# Patient Record
Sex: MALE | Race: WHITE | Employment: FULL TIME | URBAN - METROPOLITAN AREA
[De-identification: names, ages, dates, MRNs, and addresses within clinical notes are randomized per-mention and may not be internally consistent; named-entity substitution may affect disease eponyms.]

---

## 2018-09-24 ENCOUNTER — HOSPITAL ENCOUNTER (OUTPATIENT)
Dept: VASCULAR LAB | Age: 63
Discharge: HOME | End: 2018-09-24
Payer: COMMERCIAL

## 2018-09-24 PROCEDURE — 93970 EXTREMITY STUDY: CPT

## 2019-05-15 ENCOUNTER — HOSPITAL ENCOUNTER (EMERGENCY)
Age: 64
Discharge: HOME | End: 2019-05-15
Attending: EMERGENCY MEDICINE
Payer: COMMERCIAL

## 2019-05-15 ENCOUNTER — APPOINTMENT (OUTPATIENT)
Dept: CT IMAGING | Age: 64
End: 2019-05-15
Payer: COMMERCIAL

## 2019-05-15 ENCOUNTER — APPOINTMENT (OUTPATIENT)
Dept: GENERAL RADIOLOGY | Age: 64
End: 2019-05-15
Payer: COMMERCIAL

## 2019-05-15 VITALS
RESPIRATION RATE: 16 BRPM | BODY MASS INDEX: 24.33 KG/M2 | SYSTOLIC BLOOD PRESSURE: 142 MMHG | HEIGHT: 69 IN | OXYGEN SATURATION: 96 % | WEIGHT: 164.24 LBS | DIASTOLIC BLOOD PRESSURE: 71 MMHG | HEART RATE: 96 BPM | TEMPERATURE: 98.6 F

## 2019-05-15 DIAGNOSIS — K57.32 DIVERTICULITIS OF COLON: Primary | ICD-10-CM

## 2019-05-15 LAB
A/G RATIO: 1.4 (ref 1.1–2.2)
ALBUMIN SERPL-MCNC: 4.4 G/DL (ref 3.4–5)
ALP BLD-CCNC: 57 U/L (ref 40–129)
ALT SERPL-CCNC: 23 U/L (ref 10–40)
AMYLASE: 56 U/L (ref 25–115)
ANION GAP SERPL CALCULATED.3IONS-SCNC: 13 MMOL/L (ref 3–16)
AST SERPL-CCNC: 19 U/L (ref 15–37)
BASOPHILS ABSOLUTE: 0 K/UL (ref 0–0.2)
BASOPHILS RELATIVE PERCENT: 0.6 %
BILIRUB SERPL-MCNC: 0.7 MG/DL (ref 0–1)
BILIRUBIN URINE: NEGATIVE
BLOOD, URINE: NEGATIVE
BUN BLDV-MCNC: 18 MG/DL (ref 7–20)
CALCIUM SERPL-MCNC: 9.6 MG/DL (ref 8.3–10.6)
CHLORIDE BLD-SCNC: 101 MMOL/L (ref 99–110)
CLARITY: CLEAR
CO2: 24 MMOL/L (ref 21–32)
COLOR: YELLOW
CREAT SERPL-MCNC: 1 MG/DL (ref 0.8–1.3)
D DIMER: <200 NG/ML DDU (ref 0–229)
EOSINOPHILS ABSOLUTE: 0.2 K/UL (ref 0–0.6)
EOSINOPHILS RELATIVE PERCENT: 3.1 %
GFR AFRICAN AMERICAN: >60
GFR NON-AFRICAN AMERICAN: >60
GLOBULIN: 3.2 G/DL
GLUCOSE BLD-MCNC: 188 MG/DL (ref 70–99)
GLUCOSE URINE: NEGATIVE MG/DL
HCT VFR BLD CALC: 37.6 % (ref 40.5–52.5)
HEMOGLOBIN: 13.2 G/DL (ref 13.5–17.5)
KETONES, URINE: NEGATIVE MG/DL
LEUKOCYTE ESTERASE, URINE: NEGATIVE
LIPASE: 35 U/L (ref 13–60)
LYMPHOCYTES ABSOLUTE: 1.7 K/UL (ref 1–5.1)
LYMPHOCYTES RELATIVE PERCENT: 21.9 %
MCH RBC QN AUTO: 32.4 PG (ref 26–34)
MCHC RBC AUTO-ENTMCNC: 35.2 G/DL (ref 31–36)
MCV RBC AUTO: 92 FL (ref 80–100)
MICROSCOPIC EXAMINATION: NORMAL
MONOCYTES ABSOLUTE: 0.6 K/UL (ref 0–1.3)
MONOCYTES RELATIVE PERCENT: 7.9 %
NEUTROPHILS ABSOLUTE: 5.2 K/UL (ref 1.7–7.7)
NEUTROPHILS RELATIVE PERCENT: 66.5 %
NITRITE, URINE: NEGATIVE
PDW BLD-RTO: 12.9 % (ref 12.4–15.4)
PH UA: 6.5 (ref 5–8)
PLATELET # BLD: 288 K/UL (ref 135–450)
PMV BLD AUTO: 7.7 FL (ref 5–10.5)
POTASSIUM REFLEX MAGNESIUM: 4 MMOL/L (ref 3.5–5.1)
PROTEIN UA: NEGATIVE MG/DL
RBC # BLD: 4.09 M/UL (ref 4.2–5.9)
SODIUM BLD-SCNC: 138 MMOL/L (ref 136–145)
SPECIFIC GRAVITY UA: 1.01 (ref 1–1.03)
TOTAL PROTEIN: 7.6 G/DL (ref 6.4–8.2)
TROPONIN: <0.01 NG/ML
URINE REFLEX TO CULTURE: NORMAL
URINE TYPE: NORMAL
UROBILINOGEN, URINE: 0.2 E.U./DL
WBC # BLD: 7.8 K/UL (ref 4–11)

## 2019-05-15 PROCEDURE — 99284 EMERGENCY DEPT VISIT MOD MDM: CPT

## 2019-05-15 PROCEDURE — 71046 X-RAY EXAM CHEST 2 VIEWS: CPT

## 2019-05-15 PROCEDURE — 93005 ELECTROCARDIOGRAM TRACING: CPT

## 2019-05-15 PROCEDURE — 74177 CT ABD & PELVIS W/CONTRAST: CPT

## 2019-05-15 PROCEDURE — 81003 URINALYSIS AUTO W/O SCOPE: CPT

## 2019-05-15 PROCEDURE — 85025 COMPLETE CBC W/AUTO DIFF WBC: CPT

## 2019-05-15 PROCEDURE — 6360000004 HC RX CONTRAST MEDICATION: Performed by: PHYSICIAN ASSISTANT

## 2019-05-15 PROCEDURE — 83690 ASSAY OF LIPASE: CPT

## 2019-05-15 PROCEDURE — 80053 COMPREHEN METABOLIC PANEL: CPT

## 2019-05-15 PROCEDURE — 82150 ASSAY OF AMYLASE: CPT

## 2019-05-15 PROCEDURE — 84484 ASSAY OF TROPONIN QUANT: CPT

## 2019-05-15 PROCEDURE — 85379 FIBRIN DEGRADATION QUANT: CPT

## 2019-05-15 RX ORDER — 0.9 % SODIUM CHLORIDE 0.9 %
1000 INTRAVENOUS SOLUTION INTRAVENOUS ONCE
Status: DISCONTINUED | OUTPATIENT
Start: 2019-05-15 | End: 2019-05-15

## 2019-05-15 RX ORDER — METRONIDAZOLE 500 MG/1
500 TABLET ORAL 3 TIMES DAILY
Qty: 21 TABLET | Refills: 0 | Status: SHIPPED | OUTPATIENT
Start: 2019-05-15 | End: 2019-05-22

## 2019-05-15 RX ORDER — ACETAMINOPHEN 500 MG
500 TABLET ORAL EVERY 6 HOURS PRN
Qty: 30 TABLET | Refills: 0 | Status: SHIPPED | OUTPATIENT
Start: 2019-05-15 | End: 2022-03-23

## 2019-05-15 RX ORDER — KETOROLAC TROMETHAMINE 30 MG/ML
15 INJECTION, SOLUTION INTRAMUSCULAR; INTRAVENOUS ONCE
Status: DISCONTINUED | OUTPATIENT
Start: 2019-05-15 | End: 2019-05-15

## 2019-05-15 RX ORDER — CIPROFLOXACIN 500 MG/1
500 TABLET, FILM COATED ORAL 2 TIMES DAILY
Qty: 14 TABLET | Refills: 0 | Status: SHIPPED | OUTPATIENT
Start: 2019-05-15 | End: 2019-05-22

## 2019-05-15 RX ADMIN — IOPAMIDOL 75 ML: 755 INJECTION, SOLUTION INTRAVENOUS at 18:05

## 2019-05-15 ASSESSMENT — ENCOUNTER SYMPTOMS
BACK PAIN: 0
ABDOMINAL PAIN: 1
VOMITING: 0
NAUSEA: 0
EYE PAIN: 0
SHORTNESS OF BREATH: 0
COUGH: 0
DIARRHEA: 0

## 2019-05-15 ASSESSMENT — PAIN SCALES - GENERAL
PAINLEVEL_OUTOF10: 0
PAINLEVEL_OUTOF10: 0

## 2019-05-15 NOTE — ED PROVIDER NOTES
Triage note: I evaluated this patient to initiate their ED workup in an expeditious manner. Please see notes from other ED providers regarding comprehensive evaluation including full history, physical exam, interpretation of results, and medical decision making/disposition.        Taisha Polk, MARNIE - PETRA  05/15/19 6513

## 2019-05-15 NOTE — ED PROVIDER NOTES
I independently performed a history and physical on Milena Knox. All diagnostic, treatment, and disposition decisions were made by myself in conjunction with the advanced practice provider. For further details of [facility] emergency department encounter, please see LATRELL Taylor's documentation. Patient is one day of left flank pain. He is constant pain that waxes and wanes. Pain is never had before. Has a prior history of PE but states this pain is different. He was sent here to rule out PE. On exam he has left flank tenderness with no rebound, rigidity or guarding. Labs and imaging consistent with ruptured diverticulitis. I advised patient regarding signs to watch for for rupture including fever, vomiting and increased pain. I also advised to follow up with his primary physician and gastroenterologist.  I also advised him to avoid alcohol while on antibiotics. I also advised avoidance of nuts and seeds in the diet until he follows up with his gastroenterologist.      EKG  The Ekg interpreted by me shows  normal sinus rhythm with a rate of 86  Axis is   Normal  QTc is  normal  Intervals and Durations are unremarkable.       ST Segments: no acute change  No significant change from prior EKG dated 15 sep 2018            Jacqueline Jay MD  05/15/19 3732

## 2019-05-15 NOTE — ED PROVIDER NOTES
[address] [facility]        Pt Name: Timur Guerrier  MRN: [id number]  [place] 1955  Date of evaluation: 5/15/2019  Provider: LATRELL Boyd  PCP: Evelina Aguayo MD    This patient was seen and evaluated by the attending physician Ge Whitaker, [address] [place] [address]       Chief Complaint   Patient presents with    Flank Pain     left flank pain. hx of pe. Dr sent pt due to r/o other issues. HISTORY OF PRESENT ILLNESS   (Location/Symptom, Timing/Onset, Context/Setting, Quality, Duration, Modifying Factors, Severity)  Note limiting factors. Timur Guerrier is a 58 y.o. male who presents to the ED for evaluation of left flank pain. Sent by PCP to ED. Onset of left flank pain was yesterday morning upon awakening. It occasionally radiates across his lower abdomen. Pain rated 3-5/10. It is sharp. Deep breaths make it worse. The patient denies fever or chills, chest pain, shortness of breath,  nausea, vomiting, diarrhea. No recent travel, exposure to sick contacts, or recent antibiotics. No other acute concerns, associated symptoms or modifying factors. Patient with PMH of PE in September 2018. Patient was on Eliquis for 4 months. Patient is not on any blood thinners at this time. Patient is unsure if this feels similar to prior PE. Nursing Notes were all reviewed and agreed with or any disagreements were addressed  in the HPI. REVIEW OF SYSTEMS    (2-9 systems for level 4, 10 or more for level 5)     Review of Systems   Constitutional: Negative for chills, fatigue and fever. Eyes: Negative for pain. Respiratory: Negative for cough and shortness of breath. Cardiovascular: Negative for chest pain. Gastrointestinal: Positive for abdominal pain. Negative for diarrhea, nausea and vomiting. Genitourinary: Negative for dysuria. Musculoskeletal: Negative for back pain, neck pain and neck stiffness. Skin: Negative for rash. Neurological: Negative for dizziness and headaches. Psychiatric/Behavioral: Negative for confusion. Positives and Pertinent negatives as per HPI. Except as noted abovein the ROS, all other systems were reviewed and negative. PAST MEDICAL HISTORY     Past Medical History:   Diagnosis Date    Hyperlipidemia          SURGICAL HISTORY   No past surgical history on file. CURRENTMEDICATIONS       Previous Medications    APIXABAN (ELIQUIS STARTER PACK) 5 MG TABS TABLET    Take 10 mg (2 tablets) orally twice daily for 7 days, then take 5 mg (1 tablet) orally twice daily thereafter. (1st dose given in ED)    GEMFIBROZIL (LOPID) 600 MG TABLET    Take 600 mg by mouth 2 times daily (before meals)    PANTOPRAZOLE (PROTONIX) 20 MG TABLET    Take 2 tablets by mouth daily         ALLERGIES     Patient has no known allergies. FAMILYHISTORY     No family history on file.        SOCIAL HISTORY       Social History     Socioeconomic History    Marital status:      Spouse name: Not on file    Number of children: Not on file    Years of education: Not on file    Highest education level: Not on file   Occupational History    Not on file   Social Needs    Financial resource strain: Not on file    Food insecurity:     Worry: Not on file     Inability: Not on file    Transportation needs:     Medical: Not on file     Non-medical: Not on file   Tobacco Use    Smoking status: Not on file   Substance and Sexual Activity    Alcohol use: Not on file    Drug use: Not on file    Sexual activity: Not on file   Lifestyle    Physical activity:     Days per week: Not on file     Minutes per session: Not on file    Stress: Not on file   Relationships    Social connections:     Talks on phone: Not on file     Gets together: Not on file     Attends [employer] service: Not on file     Active member of club or organization: Not on file     Attends meetings of clubs or organizations: Not on file     Relationship status: Not on file    Intimate partner violence:     Fear of current or ex partner: Not on file     Emotionally abused: Not on file     Physically abused: Not on file     Forced sexual activity: Not on file   Other Topics Concern    Not on file   Social History Narrative    Not on file       SCREENINGS             PHYSICAL EXAM    (up to 7 for level 4, 8 or more for level 5)     ED Triage Vitals [05/15/19 1653]   BP Temp Temp Source Pulse Resp SpO2 Height Weight   (!) 142/71 98.6 °F (37 °C) Oral 96 16 96 % 5' 9\" (1.753 m) 164 lb 3.9 oz (74.5 kg)       Physical Exam   Constitutional: He is oriented to person, place, and time. He appears well-developed. No distress. HENT:   Head: Normocephalic and atraumatic. Eyes: Right eye exhibits no discharge. Left eye exhibits no discharge. Neck: Normal range of motion. Neck supple. Pulmonary/Chest: No stridor. No respiratory distress. Abdominal: Soft. He exhibits no distension and no mass. There is tenderness (LLQ). There is no rebound and no guarding. Musculoskeletal: Normal range of motion. Neurological: He is alert and oriented to person, place, and time. No gross facial drooping. Moves all 4 extremities spontaneously. Skin: Skin is warm and dry. He is not diaphoretic. No pallor. Psychiatric: He has a normal mood and affect. His behavior is normal.   Nursing note and vitals reviewed.       DIAGNOSTIC RESULTS   LABS:    Labs Reviewed   CBC WITH AUTO DIFFERENTIAL - Abnormal; Notable for the following components:       Result Value    RBC 4.09 (*)     Hemoglobin 13.2 (*)     Hematocrit 37.6 (*)     All other components within normal limits    Narrative:     Performed at:  [address] [address]   Phone [phone]   COMPREHENSIVE METABOLIC PANEL W/ REFLEX TO MG FOR LOW K - Abnormal; Notable for the following components:    Glucose 188 (*)     All other components within normal limits    Narrative:     Performed at:  [facility]  [address]  [place] [address]   Phone [phone]   URINE RT REFLEX TO CULTURE    Narrative:     Performed at:  [facility]  [address] [facility] [place] [address]   Phone [phone]   TROPONIN    Narrative:     Performed at:  [facility]  [address] [facility] [facility] [place] [address]   Phone [phone]   LIPASE    Narrative:     Performed at:  [facility]  [address] [facility] [facility] [place] [address]   Phone [phone]   AMYLASE    Narrative:     Performed at:  [facility]  [address] [facility] [facility] [place] [address]   Phone [phone]   D-DIMER, QUANTITATIVE    Narrative:     Performed at:  [facility]  [address] [facility] [place] [facility] [address]   Phone [phone]       All other labs were within normal range or not returned as of this dictation. EKG: All EKG's are interpreted by the Emergency Department Physician who either signs orCo-signs this chart in the absence of a cardiologist.  Please see their note for interpretation of EKG. RADIOLOGY:   Non-plain film images such as CT, Ultrasound and MRI are read by the radiologist. Plain radiographic images are visualized andpreliminarily interpreted by the  ED Provider with the below findings:        Interpretation perthe Radiologist below, if available at the time of this note:    CT ABDOMEN PELVIS W IV CONTRAST Additional Contrast? None   Preliminary Result   1. Findings are consistent with mild acute uncomplicated diverticulitis of   the descending colon within the left lower quadrant. There is no evidence of   perforation, free air, or abscess. 2. No urinary stones or hydronephrosis. 3. Diffuse hepatic steatosis.          XR CHEST STANDARD (2 VW) Final Result   No evidence of acute process. Xr Chest Standard (2 Vw)    Result Date: 5/15/2019  EXAMINATION: TWO XRAY VIEWS OF THE CHEST 5/15/2019 5:46 pm COMPARISON: 09/15/2018 HISTORY: ORDERING SYSTEM PROVIDED HISTORY: Left lower chest pain/upper abd pain TECHNOLOGIST PROVIDED HISTORY: Reason for exam:->Left lower chest pain/upper abd pain Ordering Physician Provided Reason for Exam: Left lower chest pain/upper abd pain Acuity: Acute Type of Exam: Initial FINDINGS: Normal heart size and pulmonary vasculature. No focal consolidations, pleural effusions, or pneumothorax. No evidence of acute process. PROCEDURES   Unless otherwise noted below, none     Procedures    CRITICAL CARE TIME   N/A    CONSULTS:  None      EMERGENCY DEPARTMENT COURSE and DIFFERENTIALDIAGNOSIS/MDM:   Vitals:    Vitals:    05/15/19 1653   BP: (!) 142/71   Pulse: 96   Resp: 16   Temp: 98.6 °F (37 °C)   TempSrc: Oral   SpO2: 96%   Weight: 164 lb 3.9 oz (74.5 kg)   Height: 5' 9\" (1.753 m)       Patient was given thefollowing medications:  Medications   iopamidol (ISOVUE-370) 76 % injection 75 mL (75 mLs Intravenous Given 5/15/19 1805)       Differential diagnosis: Abdominal Aortic Aneurysm, acute coronary syndrome, Ischemic Bowel, Bowel Obstruction, PUD, GERD, Acute Cholecystitis, Pancreatitis, Hepatitis, Colitis, SMA Syndrome, Mesenteric Steal Syndrome, Splanchnic Vein Thrombosis, Acute Appendicitis, Diverticulitis, Pyelonephritis, UTI, STD, Torsion, other    Patient seen and examined today for LLQ pain. See HPI for patient presentation. Patient is in no acute distress, nontoxic, afebrile with unremarkable vital signs. I have reviewed the patient's laboratory results. The patient has normal WBCs, hematocrit and platelets. They have no severe electrolyte abnormality or renal impairment. Lipase was normal.   Troponin and ddimer negative; do not suspect ACS or PE. EKG in NSR interpreted by attending.    CT abd/pelvis with a voice recognition program.  Efforts were made to edit the dictations but occasionally words are mis-transcribed.)    LATRELL Lawrence (electronically signed)            LATRELL Lawrence  05/15/19 5877

## 2019-05-16 LAB
EKG ATRIAL RATE: 86 BPM
EKG DIAGNOSIS: NORMAL
EKG P AXIS: 51 DEGREES
EKG P-R INTERVAL: 160 MS
EKG Q-T INTERVAL: 364 MS
EKG QRS DURATION: 82 MS
EKG QTC CALCULATION (BAZETT): 435 MS
EKG R AXIS: -27 DEGREES
EKG T AXIS: 47 DEGREES
EKG VENTRICULAR RATE: 86 BPM

## 2019-05-16 PROCEDURE — 93010 ELECTROCARDIOGRAM REPORT: CPT | Performed by: INTERNAL MEDICINE

## 2021-02-06 ENCOUNTER — HOSPITAL ENCOUNTER (EMERGENCY)
Age: 66
Discharge: HOME | End: 2021-02-06
Attending: EMERGENCY MEDICINE
Payer: MEDICARE

## 2021-02-06 VITALS
RESPIRATION RATE: 14 BRPM | WEIGHT: 164.02 LBS | HEART RATE: 96 BPM | BODY MASS INDEX: 24.29 KG/M2 | TEMPERATURE: 97.8 F | DIASTOLIC BLOOD PRESSURE: 72 MMHG | SYSTOLIC BLOOD PRESSURE: 134 MMHG | HEIGHT: 69 IN | OXYGEN SATURATION: 93 %

## 2021-02-06 DIAGNOSIS — M79.605 LEFT LEG PAIN: Primary | ICD-10-CM

## 2021-02-06 DIAGNOSIS — R79.89 ELEVATED SERUM CREATININE: ICD-10-CM

## 2021-02-06 LAB
ANION GAP SERPL CALCULATED.3IONS-SCNC: 12 MMOL/L (ref 3–16)
BASOPHILS ABSOLUTE: 0.1 K/UL (ref 0–0.2)
BASOPHILS RELATIVE PERCENT: 0.9 %
BUN BLDV-MCNC: 18 MG/DL (ref 7–20)
CALCIUM SERPL-MCNC: 10 MG/DL (ref 8.3–10.6)
CHLORIDE BLD-SCNC: 100 MMOL/L (ref 99–110)
CO2: 26 MMOL/L (ref 21–32)
CREAT SERPL-MCNC: 1.4 MG/DL (ref 0.8–1.3)
EOSINOPHILS ABSOLUTE: 0.5 K/UL (ref 0–0.6)
EOSINOPHILS RELATIVE PERCENT: 5.8 %
GFR AFRICAN AMERICAN: >60
GFR NON-AFRICAN AMERICAN: 51
GLUCOSE BLD-MCNC: 108 MG/DL (ref 70–99)
HCT VFR BLD CALC: 39.6 % (ref 40.5–52.5)
HEMOGLOBIN: 13.7 G/DL (ref 13.5–17.5)
LYMPHOCYTES ABSOLUTE: 2.3 K/UL (ref 1–5.1)
LYMPHOCYTES RELATIVE PERCENT: 24.7 %
MCH RBC QN AUTO: 31.6 PG (ref 26–34)
MCHC RBC AUTO-ENTMCNC: 34.5 G/DL (ref 31–36)
MCV RBC AUTO: 91.6 FL (ref 80–100)
MONOCYTES ABSOLUTE: 0.9 K/UL (ref 0–1.3)
MONOCYTES RELATIVE PERCENT: 9.7 %
NEUTROPHILS ABSOLUTE: 5.4 K/UL (ref 1.7–7.7)
NEUTROPHILS RELATIVE PERCENT: 58.9 %
PDW BLD-RTO: 13.4 % (ref 12.4–15.4)
PLATELET # BLD: 378 K/UL (ref 135–450)
PMV BLD AUTO: 7.7 FL (ref 5–10.5)
POTASSIUM REFLEX MAGNESIUM: 4.6 MMOL/L (ref 3.5–5.1)
RBC # BLD: 4.32 M/UL (ref 4.2–5.9)
SODIUM BLD-SCNC: 138 MMOL/L (ref 136–145)
TOTAL CK: 217 U/L (ref 39–308)
WBC # BLD: 9.2 K/UL (ref 4–11)

## 2021-02-06 PROCEDURE — 80048 BASIC METABOLIC PNL TOTAL CA: CPT

## 2021-02-06 PROCEDURE — 85025 COMPLETE CBC W/AUTO DIFF WBC: CPT

## 2021-02-06 PROCEDURE — 99285 EMERGENCY DEPT VISIT HI MDM: CPT

## 2021-02-06 PROCEDURE — 6370000000 HC RX 637 (ALT 250 FOR IP): Performed by: EMERGENCY MEDICINE

## 2021-02-06 PROCEDURE — 82550 ASSAY OF CK (CPK): CPT

## 2021-02-06 PROCEDURE — 36415 COLL VENOUS BLD VENIPUNCTURE: CPT

## 2021-02-06 RX ADMIN — APIXABAN 10 MG: 5 TABLET, FILM COATED ORAL at 18:19

## 2021-02-06 RX ADMIN — APIXABAN 80 MG: 5 TABLET, FILM COATED ORAL at 18:19

## 2021-02-06 ASSESSMENT — PAIN DESCRIPTION - FREQUENCY: FREQUENCY: CONTINUOUS

## 2021-02-06 ASSESSMENT — PAIN SCALES - GENERAL
PAINLEVEL_OUTOF10: 4
PAINLEVEL_OUTOF10: 0
PAINLEVEL_OUTOF10: 3

## 2021-02-06 ASSESSMENT — PAIN DESCRIPTION - DESCRIPTORS: DESCRIPTORS: SHARP

## 2021-02-06 ASSESSMENT — PAIN DESCRIPTION - ORIENTATION: ORIENTATION: LEFT

## 2021-02-06 ASSESSMENT — PAIN DESCRIPTION - LOCATION: LOCATION: LEG

## 2021-02-06 ASSESSMENT — PAIN - FUNCTIONAL ASSESSMENT: PAIN_FUNCTIONAL_ASSESSMENT: ACTIVITIES ARE NOT PREVENTED

## 2021-02-06 NOTE — ED PROVIDER NOTES
EMERGENCY DEPARTMENT PROVIDER NOTE    Patient Identification  Pt Name: Alyssa Hdez  MRN: [id number]  [place] 1955  Date of evaluation: 2/6/2021  Provider: Dario Guerrero DO  PCP: Trixie Jett MD    Chief Complaint  Leg Pain (left calf pain x 2 weeks, left ankle slightly swollen, sent by Dr. Vince Lozano to be checked for a possible clot, states he had a thrombus in his lower left lung 2.5 years ago)      HPI  (History provided by patient)  This is a 76 y.o. male with pertinent past medical history of high cholesterol, prior unprovoked pulmonary embolism about 3 years ago who was brought in by family for left lower leg pain ongoing for the past 2 weeks. Pain is worsened in the morning and with direct touch, improves with standing and movement. 6 out of 10 in severity at its worst, currently 3 out of 10. Described as sharp and aching. He denies any history of similar pain. He denies any trauma or injury to the leg or ankle. Denies any weakness, numbness or tingling. No chest pain or shortness of breath. Patient completed a course of anticoagulation after his prior pulmonary embolism, however is no longer on any blood thinners. Patient sent by his PCP due to concern for DVT for ultrasound. Currently patient states his pain is about 3 out of 10. He declined any pain medications when I offered. ROS    Const:  No fevers, no chills, no generalized weakness  Skin:  No rash, no lesions  Card:  No chest pain, no palpitations  Resp:  No shortness of breath, no cough, no wheezing  Abd:  No abdominal pain, no nausea, no vomiting  MSK:  No joint pain, +myalgia  Neuro:  No focal weakness, no paresthesia    All other systems reviewed and negative unless otherwise noted in HPI          I have reviewed the following nursing documentation:  Allergies: Patient has no known allergies.     Past medical history:   Past Medical History:   Diagnosis Date    Hyperlipidemia      Past surgical history: History reviewed. No pertinent surgical history. Home medications:   Discharge Medication List as of 2/6/2021  6:16 PM      CONTINUE these medications which have NOT CHANGED    Details   acetaminophen (APAP EXTRA STRENGTH) 500 MG tablet Take 1 tablet by mouth every 6 hours as needed for Pain, Disp-30 tablet, R-0Print      gemfibrozil (LOPID) 600 MG tablet Take 600 mg by mouth 2 times daily (before meals)Historical Med      apixaban (ELIQUIS STARTER PACK) 5 MG TABS tablet Take 10 mg (2 tablets) orally twice daily for 7 days, then take 5 mg (1 tablet) orally twice daily thereafter. (1st dose given in ED), Disp-72 tablet, R-0Print      pantoprazole (PROTONIX) 20 MG tablet Take 2 tablets by mouth daily, Disp-30 tablet, R-0Print             Social history:  reports that he has never smoked. He has never used smokeless tobacco. He reports that he does not drink alcohol or use drugs. Family history:  History reviewed. No pertinent family history. Exam  ED Triage Vitals   BP Temp Temp Source Pulse Resp SpO2 Height Weight   02/06/21 1518 02/06/21 1518 02/06/21 1518 02/06/21 1518 02/06/21 1518 02/06/21 1518 02/06/21 1516 02/06/21 1516   134/72 97.8 °F (36.6 °C) Temporal 96 14 93 % 5' 9\" (1.753 m) 164 lb 0.4 oz (74.4 kg)     Nursing note and vitals reviewed. Constitutional: Well developed, well nourished. Non-toxic in appearance. HENT:      Head: Normocephalic and atraumatic. Ears: External ears normal.      Nose: Nose normal.     Mouth: Membrane mucosa moist and pink. Eyes: Anicteric sclera. No discharge. Neck: Supple. Trachea midline. Cardiovascular: RRR; no murmurs, rubs, or gallops. DP 2+ and symmetric, distal cap refill brisk  Pulmonary/Chest: Effort normal. No respiratory distress. CTAB. No stridor. No wheezes. No rales. Musculoskeletal: Moves all extremities. No gross deformity. Tenderness to palpation over lateral aspect of left ankle and left calf, no overlying skin changes. No induration. No palpable cords. Calf compartments are soft and easily compressible. Neurological: Alert and oriented. Face symmetric. Speech is clear. 5 out of 5 motor and sensation grossly intact bilateral lower extremities. Skin: Warm and dry. No rash. Psychiatric: Normal mood and affect. Behavior is normal.    Procedures          Radiology  VL Extremity Venous Left    (Results Pending)       Labs  Results for orders placed or performed during the hospital encounter of 02/06/21   CBC Auto Differential   Result Value Ref Range    WBC 9.2 4.0 - 11.0 K/uL    RBC 4.32 4.20 - 5.90 M/uL    Hemoglobin 13.7 13.5 - 17.5 g/dL    Hematocrit 39.6 (L) 40.5 - 52.5 %    MCV 91.6 80.0 - 100.0 fL    MCH 31.6 26.0 - 34.0 pg    MCHC 34.5 31.0 - 36.0 g/dL    RDW 13.4 12.4 - 15.4 %    Platelets 438 569 - 821 K/uL    MPV 7.7 5.0 - 10.5 fL    Neutrophils % 58.9 %    Lymphocytes % 24.7 %    Monocytes % 9.7 %    Eosinophils % 5.8 %    Basophils % 0.9 %    Neutrophils Absolute 5.4 1.7 - 7.7 K/uL    Lymphocytes Absolute 2.3 1.0 - 5.1 K/uL    Monocytes Absolute 0.9 0.0 - 1.3 K/uL    Eosinophils Absolute 0.5 0.0 - 0.6 K/uL    Basophils Absolute 0.1 0.0 - 0.2 K/uL   Basic Metabolic Panel w/ Reflex to MG   Result Value Ref Range    Sodium 138 136 - 145 mmol/L    Potassium reflex Magnesium 4.6 3.5 - 5.1 mmol/L    Chloride 100 99 - 110 mmol/L    CO2 26 21 - 32 mmol/L    Anion Gap 12 3 - 16    Glucose 108 (H) 70 - 99 mg/dL    BUN 18 7 - 20 mg/dL    CREATININE 1.4 (H) 0.8 - 1.3 mg/dL    GFR Non- 51 (A) >60    GFR African American >60 >60    Calcium 10.0 8.3 - 10.6 mg/dL   CK   Result Value Ref Range    Total  39 - 308 U/L       Screenings           MDM and ED Course    Patient afebrile and nontoxic. No distress. Lower extremities are neurovascularly intact, no findings to suggest limb ischemia. CK normal, pain mild-moderate, very low suspicion for compartment syndrome. No evidence of skin or soft tissue infection.   Laboratory work-up without clinically significant electrolyte derangement. Patient does have mild elevation of his serum creatinine over baseline, likely prerenal.  Reports no difficulties with urination. Patient does have history of VTE and further evaluation for DVT is reasonable. Unfortunately D-dimer will be unreliable and we do not have vascular ultrasound available at time of patient's emergency department visit. Absent a DVT patient symptoms may be related to tendinitis or strain. I discussed risk versus benefits of empirically starting anticoagulation with Eliquis with patient he is agreeable proceed. We will schedule for outpatient vascular ultrasound on Monday. I also did discuss patient's abnormal creatinine, given ongoing COVID-19 pandemic I feel that the risks of admission to the hospital outweigh potential benefits and patient is agreeable. Counseled importance of proper fluid intake, will have his renal function rechecked by PCP within 72 hours or return to emergency department for recheck if this is not possible. [place] safe for discharge to self-care. Strict return precautions were discussed. Management decisions relating to this patient encounter were made during the VVABQ-51 public health emergency. As a result, admission to the hospital and further ED observation/treatment pose increased risk due to multiple factors. At this point in time, the risk of hospital admission or further ED observation/treatment of this patient is deemed to be greater than the risk of discharge. I engaged in a shared decision-making conversation with the patient. The patient agrees and wishes to go home. Final Impression  1. Left leg pain    2. Elevated serum creatinine        Blood pressure 134/72, pulse 96, temperature 97.8 °F (36.6 °C), temperature source Temporal, resp. rate 14, height 5' 9\" (1.753 m), weight 164 lb 0.4 oz (74.4 kg), SpO2 93 %.      Disposition:  DISPOSITION Decision To Discharge 02/06/2021 06:21:35 PM      Patient Referrals:  MD Jose Alberto Tanner 2  [address] [address]  [facility] [address] 3  [phone]    In 2 days        Discharge Medications:  Discharge Medication List as of 2/6/2021  6:16 PM          Discontinued Medications:  Discharge Medication List as of 2/6/2021  6:16 PM          This chart was generated using the [address] [place] [address] dictation system. I created this record but it may contain dictation errors given the limitations of this technology.     Jordana Alejandro DO (electronically signed)  Attending Emergency Physician       Jordana Alejandro DO  02/06/21 3640

## 2021-02-08 ENCOUNTER — ANTI-COAG VISIT (OUTPATIENT)
Dept: PHARMACY | Age: 66
End: 2021-02-08
Payer: MEDICARE

## 2021-02-08 ENCOUNTER — HOSPITAL ENCOUNTER (OUTPATIENT)
Dept: VASCULAR LAB | Age: 66
Discharge: HOME | End: 2021-02-08
Payer: MEDICARE

## 2021-02-08 DIAGNOSIS — M79.605 LEFT LEG PAIN: ICD-10-CM

## 2021-02-08 PROCEDURE — 93971 EXTREMITY STUDY: CPT

## 2021-02-08 PROCEDURE — 99211 OFF/OP EST MAY X REQ PHY/QHP: CPT

## 2021-02-08 NOTE — PROGRESS NOTES
.Chani Lovelace has been diagnosed with a DVT in the on left lower extremity. THIS VISIT WAS COMPLETED AS:   []    A VIRTUAL VISIT VIA TELEPHONE IN EFFORTS TO REDUCE THE SPREAD OF COVID-19.  []    A DRIVE-THRU VISIT IN EFFORTS TO REDUCE THE SPREAD OF COVID-19. [x]    AN IN PERSON VISIT. PROTOCOLS WERE FOLLOWED WITH PRECAUTIONS TO REDUCE THE SPREAD OF COVID-19. After discussion with Dr. Carla Simons it was recommended that the patient start apixaban 10mg BID x 7d then 5mg BID. The patient will follow up with their PCP in 1 week. I faxed  A copy of venous doppler to Dr Marisol Monique  The following information was provided to the patient regarding apixaban:    Eliquis (apixaban) is a blood thinner. What are some side effects that I need to call my doctor about right away? If you have any of the following signs or symptoms that may be related to a very bad side effect:  · Signs of an allergic reaction, like rash; hives; itching; red, swollen, blistered, or peeling skin with or without fever; wheezing; tightness in the chest or throat; trouble breathing or talking; unusual hoarseness; or swelling of the mouth, face, lips, tongue, or throat. · Signs of bleeding like throwing up blood or throw up that looks like coffee grounds; coughing up blood; blood in the urine; black, red, or tarry stools; bleeding from the gums; vaginal bleeding that is not normal; bruises without a reason or that get bigger; or any bleeding that is very bad or that you cannot stop. · Very bad dizziness or passing out. · Feeling confused. · Very bad headache. · Very bad joint pain or swelling. · Chest pain or pressure. · Wheezing. What do I do if I miss a dose? · Take a missed dose as soon as you think about it on the same day you missed the dose.   · Do not take 2 doses at the same time or extra doses    [facility]  Anticoagulation Service  DVT Program  [phone]      CLINICAL PHARMACY CONSULT: MED [address] [place] [address] Street: No  Total # of Interventions Recommended: 1  - New Order #: 1 New Medication Order Reason(s): Needs Additional Medication Therapy    Total Interventions Accepted: 1  Time Spent (min): 15    Phillip Sultana, JuarezD

## 2021-03-25 ENCOUNTER — APPOINTMENT (OUTPATIENT)
Dept: GENERAL RADIOLOGY | Age: 66
End: 2021-03-25
Payer: MEDICARE

## 2021-03-25 ENCOUNTER — APPOINTMENT (OUTPATIENT)
Dept: CT IMAGING | Age: 66
End: 2021-03-25
Payer: MEDICARE

## 2021-03-25 ENCOUNTER — HOSPITAL ENCOUNTER (EMERGENCY)
Age: 66
Discharge: HOME | End: 2021-03-25
Payer: MEDICARE

## 2021-03-25 VITALS
TEMPERATURE: 97 F | OXYGEN SATURATION: 99 % | SYSTOLIC BLOOD PRESSURE: 149 MMHG | DIASTOLIC BLOOD PRESSURE: 60 MMHG | RESPIRATION RATE: 15 BRPM | HEART RATE: 85 BPM

## 2021-03-25 DIAGNOSIS — I82.5Y2 CHRONIC DEEP VEIN THROMBOSIS (DVT) OF PROXIMAL VEIN OF LEFT LOWER EXTREMITY (HCC): ICD-10-CM

## 2021-03-25 DIAGNOSIS — M79.605 LEFT LEG PAIN: Primary | ICD-10-CM

## 2021-03-25 LAB
ANION GAP SERPL CALCULATED.3IONS-SCNC: 12 MMOL/L (ref 3–16)
APTT: 36.4 SEC (ref 24.2–36.2)
BASOPHILS ABSOLUTE: 0.1 K/UL (ref 0–0.2)
BASOPHILS RELATIVE PERCENT: 0.8 %
BUN BLDV-MCNC: 17 MG/DL (ref 7–20)
CALCIUM SERPL-MCNC: 9.6 MG/DL (ref 8.3–10.6)
CHLORIDE BLD-SCNC: 103 MMOL/L (ref 99–110)
CO2: 23 MMOL/L (ref 21–32)
CREAT SERPL-MCNC: 1.1 MG/DL (ref 0.8–1.3)
EOSINOPHILS ABSOLUTE: 0.4 K/UL (ref 0–0.6)
EOSINOPHILS RELATIVE PERCENT: 6.1 %
GFR AFRICAN AMERICAN: >60
GFR NON-AFRICAN AMERICAN: >60
GLUCOSE BLD-MCNC: 114 MG/DL (ref 70–99)
HCT VFR BLD CALC: 38 % (ref 40.5–52.5)
HEMOGLOBIN: 13.3 G/DL (ref 13.5–17.5)
INR BLD: 1.18 (ref 0.86–1.14)
LYMPHOCYTES ABSOLUTE: 2.1 K/UL (ref 1–5.1)
LYMPHOCYTES RELATIVE PERCENT: 28.9 %
MCH RBC QN AUTO: 31.9 PG (ref 26–34)
MCHC RBC AUTO-ENTMCNC: 35 G/DL (ref 31–36)
MCV RBC AUTO: 91.1 FL (ref 80–100)
MONOCYTES ABSOLUTE: 0.6 K/UL (ref 0–1.3)
MONOCYTES RELATIVE PERCENT: 9 %
NEUTROPHILS ABSOLUTE: 3.9 K/UL (ref 1.7–7.7)
NEUTROPHILS RELATIVE PERCENT: 55.2 %
PDW BLD-RTO: 12.9 % (ref 12.4–15.4)
PLATELET # BLD: 285 K/UL (ref 135–450)
PMV BLD AUTO: 8.1 FL (ref 5–10.5)
POTASSIUM REFLEX MAGNESIUM: 4.7 MMOL/L (ref 3.5–5.1)
PROTHROMBIN TIME: 13.7 SEC (ref 10–13.2)
RBC # BLD: 4.17 M/UL (ref 4.2–5.9)
SODIUM BLD-SCNC: 138 MMOL/L (ref 136–145)
TROPONIN: <0.01 NG/ML
WBC # BLD: 7.2 K/UL (ref 4–11)

## 2021-03-25 PROCEDURE — 84484 ASSAY OF TROPONIN QUANT: CPT

## 2021-03-25 PROCEDURE — 71260 CT THORAX DX C+: CPT

## 2021-03-25 PROCEDURE — 2580000003 HC RX 258: Performed by: GENERAL ACUTE CARE HOSPITAL

## 2021-03-25 PROCEDURE — 6360000004 HC RX CONTRAST MEDICATION

## 2021-03-25 PROCEDURE — 85730 THROMBOPLASTIN TIME PARTIAL: CPT

## 2021-03-25 PROCEDURE — 36415 COLL VENOUS BLD VENIPUNCTURE: CPT

## 2021-03-25 PROCEDURE — 99284 EMERGENCY DEPT VISIT MOD MDM: CPT

## 2021-03-25 PROCEDURE — 85025 COMPLETE CBC W/AUTO DIFF WBC: CPT

## 2021-03-25 PROCEDURE — 93971 EXTREMITY STUDY: CPT

## 2021-03-25 PROCEDURE — 80048 BASIC METABOLIC PNL TOTAL CA: CPT

## 2021-03-25 PROCEDURE — 93005 ELECTROCARDIOGRAM TRACING: CPT

## 2021-03-25 PROCEDURE — 85610 PROTHROMBIN TIME: CPT

## 2021-03-25 PROCEDURE — 71045 X-RAY EXAM CHEST 1 VIEW: CPT

## 2021-03-25 RX ORDER — 0.9 % SODIUM CHLORIDE 0.9 %
1000 INTRAVENOUS SOLUTION INTRAVENOUS ONCE
Status: COMPLETED | OUTPATIENT
Start: 2021-03-25 | End: 2021-03-25

## 2021-03-25 RX ADMIN — SODIUM CHLORIDE 1000 ML: 9 INJECTION, SOLUTION INTRAVENOUS at 15:10

## 2021-03-25 RX ADMIN — IOPAMIDOL 75 ML: 755 INJECTION, SOLUTION INTRAVENOUS at 15:23

## 2021-03-25 ASSESSMENT — PAIN DESCRIPTION - DESCRIPTORS: DESCRIPTORS: CONSTANT;ACHING

## 2021-03-25 ASSESSMENT — ENCOUNTER SYMPTOMS
VOMITING: 0
NAUSEA: 0
CHEST TIGHTNESS: 0
SORE THROAT: 0
WHEEZING: 0
BACK PAIN: 0
SHORTNESS OF BREATH: 0
COLOR CHANGE: 0
EYE PAIN: 0
ABDOMINAL PAIN: 0

## 2021-03-25 ASSESSMENT — PAIN SCALES - GENERAL
PAINLEVEL_OUTOF10: 4
PAINLEVEL_OUTOF10: 4

## 2021-03-25 ASSESSMENT — PAIN DESCRIPTION - LOCATION
LOCATION: LEG
LOCATION: LEG

## 2021-03-25 ASSESSMENT — PAIN SCALES - WONG BAKER: WONGBAKER_NUMERICALRESPONSE: 4

## 2021-03-25 ASSESSMENT — PAIN DESCRIPTION - FREQUENCY: FREQUENCY: CONTINUOUS

## 2021-03-25 ASSESSMENT — PAIN DESCRIPTION - PROGRESSION: CLINICAL_PROGRESSION: NOT CHANGED

## 2021-03-25 NOTE — ED PROVIDER NOTES
[address] [facility]        Pt Name: Lupillo Ramirez  MRN: [id number]  [place] 1955  Date of evaluation: 3/25/2021  Provider: MARNIE Wise - CNP  PCP: Leelee Wooten MD    TOM. I have evaluated this patient. My supervising physician was available for consultation. CHIEF COMPLAINT       Chief Complaint   Patient presents with    Leg Pain     left, hx of DVT    Dizziness       HISTORY OF PRESENT ILLNESS   (Location, Timing/Onset, Context/Setting, Quality, Duration, Modifying Factors, Severity, Associated Signs and Symptoms)  Note limiting factors. Lupillo Ramirez is a 76 y.o. male who presents to the emergency department today reporting left leg pain. Patient states that he was diagnosed with a DVT February 8. Patient states that he currently takes Eliquis just as directed. Patient states that he has been having mild discomfort in the left lower leg which she rates a 4 out of 10 today. He describes the pain as constant dull and throbbing. The pain does not radiate. There are no aggravating or alleviating factors. He has not taken anything for his symptoms. Patient denies new injury to the left lower leg. Patient denies having any chest pain or shortness of breath but states he did feel somewhat lightheaded earlier while at work. He states that the symptoms only lasted approximately 15 minutes. He denies headache or visual disturbances. He denies having any neck pain. He denies history of CVAs or TIAs. He denies having any unilateral extremity weakness. He denies recent travel or known sick contacts. There has been no fever, chills, or other symptoms. Nursing Notes were all reviewed and agreed with or any disagreements were addressed in the HPI. REVIEW OF SYSTEMS    (2-9 systems for level 4, 10 or more for level 5)     Review of Systems   Constitutional: Negative for chills and fever.    HENT: Negative for congestion and sore throat. Eyes: Negative for pain and visual disturbance. Respiratory: Negative for chest tightness, shortness of breath and wheezing. Cardiovascular: Negative for chest pain and palpitations. Gastrointestinal: Negative for abdominal pain, nausea and vomiting. Endocrine: Negative for polydipsia and polyuria. Genitourinary: Negative for difficulty urinating and dysuria. Musculoskeletal: Negative for back pain, gait problem, neck pain and neck stiffness. Skin: Positive for rash. Negative for color change, pallor and wound. Reports an approximate dime size pruritic rash behind the left knee which is been present for the past couple of weeks. Allergic/Immunologic: Negative for immunocompromised state. Neurological: Positive for light-headedness. Negative for dizziness, tremors, seizures, syncope, facial asymmetry, speech difficulty, weakness, numbness and headaches. Hematological: Does not bruise/bleed easily. Psychiatric/Behavioral: Negative for suicidal ideas. Positives and Pertinent negatives as per HPI. Except as noted above in the ROS, all other systems were reviewed and negative. PAST MEDICAL HISTORY     Past Medical History:   Diagnosis Date    DVT (deep venous thrombosis) ([facility] [address]     Hyperlipidemia          SURGICAL HISTORY   History reviewed. No pertinent surgical history.       [address]       Discharge Medication List as of 3/25/2021  5:15 PM      CONTINUE these medications which have NOT CHANGED    Details   acetaminophen (APAP EXTRA STRENGTH) 500 MG tablet Take 1 tablet by mouth every 6 hours as needed for Pain, Disp-30 tablet, R-0Print      gemfibrozil (LOPID) 600 MG tablet Take 600 mg by mouth 2 times daily (before meals)Historical Med      apixaban (ELIQUIS STARTER PACK) 5 MG TABS tablet Take 10 mg (2 tablets) orally twice daily for 7 days, then take 5 mg (1 tablet) orally twice daily thereafter. (1st dose given in ED), Disp-72 tablet, R-0Print               ALLERGIES     Patient has no known allergies. FAMILYHISTORY     History reviewed. No pertinent family history. SOCIAL HISTORY       Social History     Tobacco Use    Smoking status: Never Smoker    Smokeless tobacco: Never Used   Substance Use Topics    Alcohol use: Yes     Frequency: Never     Comment: occasionally    Drug use: Never       SCREENINGS             PHYSICAL EXAM    (up to 7 for level 4, 8 or more for level 5)     ED Triage Vitals [03/25/21 1228]   BP Temp Temp Source Pulse Resp SpO2 Height Weight   (!) 151/68 97 °F (36.1 °C) Temporal 80 18 97 % -- --       Physical Exam  Vitals signs and nursing note reviewed. Constitutional:       General: He is not in acute distress. Appearance: Normal appearance. He is not ill-appearing, toxic-appearing or diaphoretic. HENT:      Head: Normocephalic and atraumatic. Right Ear: External ear normal.      Left Ear: External ear normal.      Nose: Nose normal.      Mouth/Throat:      Mouth: Mucous membranes are moist.   Eyes:      General:         Right eye: No discharge. Left eye: No discharge. Extraocular Movements: Extraocular movements intact. Neck:      Musculoskeletal: Normal range of motion and neck supple. Cardiovascular:      Rate and Rhythm: Normal rate and regular rhythm. Pulses: Normal pulses. Heart sounds: Normal heart sounds. Pulmonary:      Effort: Pulmonary effort is normal. No respiratory distress. Abdominal:      General: Bowel sounds are normal.      Palpations: Abdomen is soft. Tenderness: There is no abdominal tenderness. Musculoskeletal:      Right knee: Normal.      Right lower leg: He exhibits tenderness. He exhibits no bony tenderness, no swelling, no deformity and no laceration. No edema. Legs:       Comments: Right lower extremity neurovascular status intact. Skin:     General: Skin is warm and dry.       Capillary Refill: Capillary refill takes less than 2 seconds. Neurological:      General: No focal deficit present. Mental Status: He is alert and oriented to person, place, and time. Psychiatric:         Attention and Perception: Attention normal.         Mood and Affect: Mood is depressed. Affect is flat. Speech: Speech normal.         Behavior: Behavior is withdrawn.          Cognition and Memory: Cognition and memory normal.         Judgment: Judgment normal.         DIAGNOSTIC RESULTS   LABS:    Labs Reviewed   CBC WITH AUTO DIFFERENTIAL - Abnormal; Notable for the following components:       Result Value    RBC 4.17 (*)     Hemoglobin 13.3 (*)     Hematocrit 38.0 (*)     All other components within normal limits    Narrative:     Performed at:  [address] [facility]   Phone [phone]   BASIC METABOLIC PANEL W/ REFLEX TO MG FOR LOW K - Abnormal; Notable for the following components:    Glucose 114 (*)     All other components within normal limits    Narrative:     Performed at:  [address] [facility]   Phone [phone]   PROTIME-INR - Abnormal; Notable for the following components:    Protime 13.7 (*)     INR 1.18 (*)     All other components within normal limits    Narrative:     Performed at:  [address] [facility]   Phone [phone]   APTT - Abnormal; Notable for the following components:    aPTT 36.4 (*)     All other components within normal limits    Narrative:     Performed at:  [address] [facility]   Phone [phone]   TROPONIN    Narrative:     Performed at:  [facility]  [address] [place] [address] [employer]   Phone [phone]       All other labs were within normal range or not returned as of this dictation. EKG: All EKG's are interpreted by the Emergency Department Physician in the absence of a cardiologist.  Please see their note for interpretation of EKG. RADIOLOGY:   Non-plain film images such as CT, Ultrasound and MRI are read by the radiologist. Plain radiographic images are visualized and preliminarily interpreted by the ED Provider with the below findings:        Interpretation per the Radiologist below, if available at the time of this note:    VL Extremity Venous Left   Final Result      CT CHEST PULMONARY EMBOLISM W CONTRAST   Final Result   No pulmonary embolism or acute pulmonary process demonstrated. XR CHEST PORTABLE   Final Result   No acute process. Vl Extremity Venous Left    Result Date: 3/25/2021  Lower Extremities DVT Study  Demographics   Patient Name        Rell REED   Date of Study       03/25/2021  Gender                 Male   Patient Number      [id number]  Date of Birth          1955   Visit Number        [id number]   Age                    76 year(s)   Accession Number    [id number]   Room Number            36   Corporate ID        [id number]    Sonographer            Anna Banerjee RVT CCT   Ordering Physician              Interpreting Physician Gela SAlexandru Garcia DO  Procedure Type of Study:   Veins:Lower Extremities DVT Study, VL EXTREMITY VENOUS DUPLEX LEFT. Vascular Sonographer Report  Indications for Study:Leg pain. Additional Indications:Patient states his leg pain is different from when he had his DVT. Patient concerned about \"lump\" in posterior calf. Patient states he is on blood thinners Impressions Left Impression Subacute versus chronic totally occluding deep vein thrombosis involving the left gastroc veins. Subacute versus chronic totally occluding superficial venous thrombosis involving the left proximal to distal small saphenous vein.  Previous scan on !Yes       !Yes            ! None      ! +------------------------+----------+---------------+----------+ ! Mid Femoral             !Yes       ! Yes            ! None      ! +------------------------+----------+---------------+----------+ ! Dist Femoral            !Yes       ! Yes            ! None      ! +------------------------+----------+---------------+----------+ ! Deep Femoral            !Yes       ! Yes            ! None      ! +------------------------+----------+---------------+----------+ ! Popliteal               !Yes       ! Yes            ! None      ! +------------------------+----------+---------------+----------+ ! GSV Below Knee          ! Yes       ! Yes            ! None      ! +------------------------+----------+---------------+----------+ ! Gastroc                 ! Yes       ! No             !AI        ! +------------------------+----------+---------------+----------+ ! Soleal                  !Yes       ! Yes            ! None      ! +------------------------+----------+---------------+----------+ ! PTV                     ! Yes       ! Yes            ! None      ! +------------------------+----------+---------------+----------+ ! ATV                     ! Yes       ! Yes            ! None      ! +------------------------+----------+---------------+----------+ ! Peroneal                !Yes       ! Yes            ! None      ! +------------------------+----------+---------------+----------+ ! GSV Calf                ! Yes       ! Yes            ! None      ! +------------------------+----------+---------------+----------+ ! SSV                     ! Yes       ! No             !AI        ! +------------------------+----------+---------------+----------+ Left Doppler Measurements +--------------+------+------+------------+ ! Location      ! Signal!Reflux! Reflux (sec)! +--------------+------+------+------------+ ! Common Femoral!Phasic! No    !            ! +--------------+------+------+------------+ ! Popliteal     !Phasic! No    ! ! +--------------+------+------+------------+    Xr Chest Portable    Result Date: 3/25/2021  EXAMINATION: ONE XRAY VIEW OF THE CHEST 3/25/2021 3:13 pm COMPARISON: Chest radiograph May 15, 2019 and priors. HISTORY: ORDERING SYSTEM PROVIDED HISTORY: dizziness TECHNOLOGIST PROVIDED HISTORY: Reason for exam:->dizziness Reason for Exam: dizziness FINDINGS: The lungs are without acute focal process. There is no effusion or pneumothorax. The cardiomediastinal silhouette is without acute process. The osseous structures are without acute process. No significant change compared to prior. No acute process. Ct Chest Pulmonary Embolism W Contrast    Result Date: 3/25/2021  EXAMINATION: CTA OF THE CHEST 3/25/2021 3:17 pm TECHNIQUE: CTA of the chest was performed after the administration of intravenous contrast.  Multiplanar reformatted images are provided for review. MIP images are provided for review. Dose modulation, iterative reconstruction, and/or weight based adjustment of the mA/kV was utilized to reduce the radiation dose to as low as reasonably achievable. COMPARISON: September 15, 2018 HISTORY: ORDERING SYSTEM PROVIDED HISTORY: dizziness, h/o previous DVT and PE TECHNOLOGIST PROVIDED HISTORY: Reason for exam:->dizziness, h/o previous DVT and PE Decision Support Exception->Emergency Medical Condition (MA) Reason for Exam: dizziness, h/o previous DVT and PE Acuity: Acute Type of Exam: Initial FINDINGS: Pulmonary Arteries: Pulmonary arteries are adequately opacified for evaluation. No pulmonary embolism demonstrated. Main pulmonary artery is normal in caliber. Mediastinum: The heart and pericardium demonstrate no acute abnormality. There is no acute abnormality of the thoracic aorta. Lungs/pleura: No acute process. No evidence of pneumonia or pulmonary edema. No pleural effusion or pneumothorax. Upper Abdomen: Negative. No acute finding. Soft Tissues/Bones: No acute bone or soft tissue abnormality. No pulmonary embolism or acute pulmonary process demonstrated. PROCEDURES   Unless otherwise noted below, none     Procedures    CRITICAL CARE TIME   N/A    CONSULTS:  None      EMERGENCY DEPARTMENT COURSE and DIFFERENTIAL DIAGNOSIS/MDM:   Vitals:    Vitals:    03/25/21 1515 03/25/21 1600 03/25/21 1630 03/25/21 1645   BP: 137/65 133/68  (!) 149/60   Pulse: 63 65 64 85   Resp: 12 13 14 15   Temp:       TempSrc:       SpO2: 98% 98%  99%       Patient was given the following medications:  Medications   0.9 % sodium chloride bolus (0 mLs Intravenous Stopped 3/25/21 1610)   iopamidol (ISOVUE-370) 76 % injection 75 mL (75 mLs Intravenous Given 3/25/21 1523)       Nursing notes reviewed. This is a 76-year-old  male with history of recent left lower extremity DVT who presents for left leg pain. Patient also states that he felt somewhat lightheaded while at work earlier. Patient is currently taking Eliquis as directed. Physical exam complete. Patient is nontoxic, afebrile, mildly hypertensive. No signs or symptoms of acute distress noted. He reports a pain level 2 out of 10. He declines need for any pain medication. Patient with extremely flat affect. Laboratory studies have been unremarkable. Left lower extremity Doppler interpreted by radiologist as above. CT chest is negative for large PE or other acute findings. Patient has remained hemodynamically stable throughout ED visit. At this time there is no evidence of any life-threatening or emergent conditions requiring immediate intervention. Patient advised to continue taking his current medications as directed. He is encouraged to begin wearing compression stockings as directed. He is advised that he may take OTC Tylenol as directed for discomfort. He agrees to follow-up with his PCP and hematologist within the next 2 to 3 days for reevaluation.   He agrees return to nearest ED for high fever, incessant vomiting, severe pain, any other worsening symptoms. Patient is discharged home in stable condition. FINAL IMPRESSION      1. Left leg pain    2.  Chronic deep vein thrombosis (DVT) of proximal vein of left lower extremity [facility])          DISPOSITION/PLAN   DISPOSITION Decision To Discharge 03/25/2021 05:06:20 PM      PATIENT REFERREDTO:  MD Jose Alberto Martin   [id number]  [address] [facility] [phone]    In 2 days      Your hematologist    In 2 days        DISCHARGE MEDICATIONS:  Discharge Medication List as of 3/25/2021  5:15 PM          DISCONTINUED MEDICATIONS:  Discharge Medication List as of 3/25/2021  5:15 PM      STOP taking these medications       pantoprazole (PROTONIX) 20 MG tablet Comments:   Reason for Stopping:                      (Please note that portions of this note were completed with a voice recognition program.  Efforts were made to edit the dictations but occasionally words are mis-transcribed.)    MARNIE Wise CNP (electronically signed)           MARNIE Wise CNP  03/25/21 1000 [facility] C[facility]  03/25/21 7538

## 2021-03-25 NOTE — ED NOTES
NSR on monitor. Resp even and unlabored. A/ox4. No acute distress noted. Denies any need at this time. Call light within reach. Bed in lowest position. Will continue to monitor.         Prabha Calderón RN  03/25/21 4915

## 2021-03-25 NOTE — ED TRIAGE NOTES
Pt presents to ED with c/o of back of left leg pain and dizziness that started this morning. Reports hx of DVT in left left. Resp even and unlabored. A/ox4. No acute distress noted. Denies any need at this time. Call light within reach. Bed in lowest position. Will continue to monitor.

## 2021-03-25 NOTE — ED NOTES
Pt discharged from ED to home. Pt verbalizes understanding to discharge instructions, teach back successful. Pt denies questions at this time. No acute distress noted. Resp even and unlabored. A/ox4. Pt instructed to follow-up as noted - return to ED if symptoms worsen. Pt verbalizes understanding. Discharged per ED NP with discharge instructions. Pt refuses ambulatory assistance to lobby and walks with steady gait.         Jaince Shook RN  03/25/21 6948

## 2021-03-26 LAB
EKG ATRIAL RATE: 72 BPM
EKG DIAGNOSIS: NORMAL
EKG P AXIS: 44 DEGREES
EKG P-R INTERVAL: 162 MS
EKG Q-T INTERVAL: 390 MS
EKG QRS DURATION: 86 MS
EKG QTC CALCULATION (BAZETT): 427 MS
EKG R AXIS: -26 DEGREES
EKG T AXIS: 22 DEGREES
EKG VENTRICULAR RATE: 72 BPM

## 2021-03-26 PROCEDURE — 93010 ELECTROCARDIOGRAM REPORT: CPT | Performed by: INTERNAL MEDICINE

## 2021-12-04 NOTE — PROGRESS NOTES
2021    Austyn Workman (:  1955) is a 78 y.o. male, here for evaluation of the following medical concerns:    Chief Complaint   Patient presents with   Gabby Morel Established New Doctor       HPI  26-year-old male with history of seemingly unprovoked pulmonary embolism , distal LLE DVT 3/2021 (off a/c at presentation) now on lifelong anticoagulation, uncomplicated sigmoid diverticulitis , hypertriglyceridemia, history of GERD with possible esophagitis on CT no longer on acid suppressive therapy, who comes in to establish care. Recently doctored with Dr. Mindy Toledo at the [facility], who has retired. 25-pack-year smoker quit , but reportedly consumes 15 ounces of alcohol a week. He describes having a couple of beers 5 nights a week. History of colon adenoma 1 cm polyp removed in , pt reports f/u scope \"small polyps\" in [id number] [facility] office Dr Bello De Jesus). Uncertain when next scope is due. Describes family history of parents being on Coumadin, uncertain reason, and sees Dr. Manohar Astudillo in hematology. Recently working with Dr. Timur Ji orthopedics for right great toe pain possible stress fracture treated last summer with steroids then NSAIDs with improvement. Kidney function has been declining somewhat. CMP 1.0 , 1.2021 (1.2021) 1.2021. CT chest and CT abdomen in 2018 and 2019 comment on perinephric stranding bilaterally without hydronephrosis. Hemoglobin has been at the lower limit of normal 13.2 February 13.. Normal platelet WBC normal differential.  His hematologist organized a anemia work-up, elevated ferritin normal iron saturation B12 folate, no SPEP. Review of Systems   Constitutional: Negative for activity change, appetite change, fatigue and unexpected weight change. HENT: Negative for dental problem, sinus pain, sore throat and trouble swallowing. Eyes: Negative for pain and visual disturbance. Respiratory: Negative for apnea, cough, chest tightness, shortness of breath and wheezing. Cardiovascular: Negative for chest pain and palpitations. Gastrointestinal: Negative for abdominal pain, blood in stool, constipation, diarrhea, nausea, rectal pain and vomiting. Endocrine: Negative for cold intolerance, heat intolerance, polydipsia, polyphagia and polyuria. Genitourinary: Negative for difficulty urinating, dysuria, flank pain, frequency, hematuria, pelvic pain, urgency, . Musculoskeletal: Negative for arthralgias, back pain, gait problem, joint swelling, myalgias, neck pain and neck stiffness. Skin: Negative for color change and rash. Neurological: Negative for dizziness, tremors, syncope, speech difficulty, weakness, light-headedness and headaches. Hematological: Negative for adenopathy. Does not bruise/bleed easily. Psychiatric/Behavioral: Negative for agitation, behavioral problems, decreased concentration, sleep disturbance and suicidal ideas. The patient is not nervous/anxious and is not hyperactive. Prior to Visit Medications    Medication Sig Taking? Authorizing Provider   acetaminophen (APAP EXTRA STRENGTH) 500 MG tablet Take 1 tablet by mouth every 6 hours as needed for Pain Yes LATRELL Venegas   gemfibrozil (LOPID) 600 MG tablet Take 600 mg by mouth 2 times daily (before meals) Yes Historical Provider, MD   apixaban (ELIQUIS STARTER PACK) 5 MG TABS tablet Take 10 mg (2 tablets) orally twice daily for 7 days, then take 5 mg (1 tablet) orally twice daily thereafter. (1st dose given in ED) Yes LATRELL Lott        No Known Allergies    Past Medical History:   Diagnosis Date    DVT (deep venous thrombosis) ([facility] [address]     Hyperlipidemia        No past surgical history on file.     Social History     Socioeconomic History    Marital status:      Spouse name: Not on file    Number of children: Not on file    Years of education: Not on file    Highest education 12/06/21 0759   BP: 137/73   Pulse: 74   Temp: 97.2 °F (36.2 °C)   TempSrc: Oral   SpO2: 97%   Weight: 162 lb (73.5 kg)   Height: 5' 10\" (1.778 m)     Estimated body mass index is 23.24 kg/m² as calculated from the following:    Height as of this encounter: 5' 10\" (1.778 m). Weight as of this encounter: 162 lb (73.5 kg). PHYSICAL EXAM  GENERAL:  Pleasant quiet  male who looks his stated age, awake alert and oriented x3, no acute distress. PSYCH: Mild psychomotor retardation. Good eye contact. Slightly restricted affect range. Mood congruent with affect. Linear thought.     LABS  Lab Review   Admission on 03/25/2021, Discharged on 03/25/2021   Component Date Value    Ventricular Rate 03/25/2021 72     Atrial Rate 03/25/2021 72     P-R Interval 03/25/2021 162     QRS Duration 03/25/2021 86     Q-T Interval 03/25/2021 390     QTc Calculation (Bazett) 03/25/2021 427     P Axis 03/25/2021 44     R Axis 03/25/2021 -26     T Axis 03/25/2021 22     Diagnosis 03/25/2021 Normal sinus rhythmNormal ECGWhen compared with ECG of 15-MAY-2019 17:33,No significant change was foundConfirmed by AGAPITO DIOR MD ([id number]) on 3/26/2021 8:15:35 AM     WBC 03/25/2021 7.2     RBC 03/25/2021 4.17*    Hemoglobin 03/25/2021 13.3*    Hematocrit 03/25/2021 38.0*    MCV 03/25/2021 91.1     MCH 03/25/2021 31.9     MCHC 03/25/2021 35.0     RDW 03/25/2021 12.9     Platelets 36/01/8489 285     MPV 03/25/2021 8.1     Neutrophils % 03/25/2021 55.2     Lymphocytes % 03/25/2021 28.9     Monocytes % 03/25/2021 9.0     Eosinophils % 03/25/2021 6.1     Basophils % 03/25/2021 0.8     Neutrophils Absolute 03/25/2021 3.9     Lymphocytes Absolute 03/25/2021 2.1     Monocytes Absolute 03/25/2021 0.6     Eosinophils Absolute 03/25/2021 0.4     Basophils Absolute 03/25/2021 0.1     Sodium 03/25/2021 138     Potassium reflex Magnesi* 03/25/2021 4.7     Chloride 03/25/2021 103     CO2 03/25/2021 23     Anion Gap 03/25/2021 12     Glucose 03/25/2021 114*    BUN 03/25/2021 17     CREATININE 03/25/2021 1.1     GFR Non- 03/25/2021 >60     GFR  03/25/2021 >60     Calcium 03/25/2021 9.6     Protime 03/25/2021 13.7*    INR 03/25/2021 1.18*    aPTT 03/25/2021 36.4*    Troponin 03/25/2021 <0.01    Admission on 02/06/2021, Discharged on 02/06/2021   Component Date Value    WBC 02/06/2021 9.2     RBC 02/06/2021 4.32     Hemoglobin 02/06/2021 13.7     Hematocrit 02/06/2021 39.6*    MCV 02/06/2021 91.6     MCH 02/06/2021 31.6     MCHC 02/06/2021 34.5     RDW 02/06/2021 13.4     Platelets 02/54/6032 378     MPV 02/06/2021 7.7     Neutrophils % 02/06/2021 58.9     Lymphocytes % 02/06/2021 24.7     Monocytes % 02/06/2021 9.7     Eosinophils % 02/06/2021 5.8     Basophils % 02/06/2021 0.9     Neutrophils Absolute 02/06/2021 5.4     Lymphocytes Absolute 02/06/2021 2.3     Monocytes Absolute 02/06/2021 0.9     Eosinophils Absolute 02/06/2021 0.5     Basophils Absolute 02/06/2021 0.1     Sodium 02/06/2021 138     Potassium reflex Magnesi* 02/06/2021 4.6     Chloride 02/06/2021 100     CO2 02/06/2021 26     Anion Gap 02/06/2021 12     Glucose 02/06/2021 108*    BUN 02/06/2021 18     CREATININE 02/06/2021 1.4*    GFR Non- 02/06/2021 51*    GFR  02/06/2021 >60     Calcium 02/06/2021 10.0     Total CK 02/06/2021 217          ASSESSMENT/PLAN  1. Stage 3 chronic kidney disease, unspecified whether stage 3a or 3b CKD (HCC)  No chronic NSAID use at this time, but did use NSAIDs for a while earlier this year for his ankle pain. Gets up once at night to urinate. I am somewhat concerned about the abnormal appearance of his kidneys on CT abdomen in 2019, not followed up on as best I can make out but I do not have access to his PCPs records.   Explained to patient that blood urine testing kidney imaging with attention paid to renal artery appears to be indicated. Referred to nephrology. - Armando Herr MD, Nephrology, [facility]    2. Recurrent acute deep vein thrombosis (DVT) of lower extremity, unspecified laterality (HCC)  Anticardiolipin antibody work-up seemingly negative, I do not have access to the other thrombophilia tests that undoubtedly has hematologist is done. Asked him to confirm that they have have been performed by hematology. He believes both his parents were on Coumadin for unclear reasons, and he has children. Eldest daughter has not yet been pregnant. 3. Hypertriglyceridemia  Fatty liver disease but no history of pancreatitis though chronic alcohol consumption enhances risk. Review outside labs, may need Vascepa 2g bid instead of gemfibrozil. 4. Routine adult health maintenance  Eligible for Tdap Shingrix [id number] Influenza shots, declines all of them. Completed Covid booster #1 Pfizer. No AAA on CT. Review results of hepatitis C and his outside providers lab work else obtain. TSH PSA vitamin D A1c have presumably been screened this year. 5. Hx of adenomatous colonic polyps  History of large 1 cm polyp 2014, appears to have had subsequent colonoscopy after uncomplicated diverticulitis in [id number]. Patient asked to contact Dr. Santana Free office to determine when next colonoscopy due and to get the results of the last colonoscopy sent to us. Return in about 4 weeks (around 1/3/2022). It was a pleasure to visit with  Darin Carter today. Answered all questions as best I could.   Justyna Dale MD   Time 34 minutes

## 2021-12-06 ENCOUNTER — OFFICE VISIT (OUTPATIENT)
Dept: PRIMARY CARE CLINIC | Age: 66
End: 2021-12-06
Payer: MEDICARE

## 2021-12-06 VITALS
HEART RATE: 74 BPM | OXYGEN SATURATION: 97 % | TEMPERATURE: 97.2 F | DIASTOLIC BLOOD PRESSURE: 73 MMHG | WEIGHT: 162 LBS | HEIGHT: 70 IN | SYSTOLIC BLOOD PRESSURE: 137 MMHG | BODY MASS INDEX: 23.19 KG/M2

## 2021-12-06 DIAGNOSIS — N18.30 STAGE 3 CHRONIC KIDNEY DISEASE, UNSPECIFIED WHETHER STAGE 3A OR 3B CKD (HCC): Primary | ICD-10-CM

## 2021-12-06 DIAGNOSIS — E78.1 HYPERTRIGLYCERIDEMIA: ICD-10-CM

## 2021-12-06 DIAGNOSIS — Z00.00 ROUTINE ADULT HEALTH MAINTENANCE: ICD-10-CM

## 2021-12-06 DIAGNOSIS — Z86.010 HX OF ADENOMATOUS COLONIC POLYPS: ICD-10-CM

## 2021-12-06 DIAGNOSIS — I82.409 RECURRENT ACUTE DEEP VEIN THROMBOSIS (DVT) OF LOWER EXTREMITY, UNSPECIFIED LATERALITY (HCC): ICD-10-CM

## 2021-12-06 PROCEDURE — 99203 OFFICE O/P NEW LOW 30 MIN: CPT | Performed by: INTERNAL MEDICINE

## 2021-12-06 SDOH — ECONOMIC STABILITY: INCOME INSECURITY: HOW HARD IS IT FOR YOU TO PAY FOR THE VERY BASICS LIKE FOOD, HOUSING, MEDICAL CARE, AND HEATING?: PATIENT DECLINED

## 2021-12-06 SDOH — SOCIAL STABILITY: SOCIAL INSECURITY
WITHIN THE LAST YEAR, HAVE TO BEEN RAPED OR FORCED TO HAVE ANY KIND OF SEXUAL ACTIVITY BY YOUR PARTNER OR EX-PARTNER?: PATIENT DECLINED

## 2021-12-06 SDOH — HEALTH STABILITY: MENTAL HEALTH: HOW MANY STANDARD DRINKS CONTAINING ALCOHOL DO YOU HAVE ON A TYPICAL DAY?: PATIENT DECLINED

## 2021-12-06 SDOH — ECONOMIC STABILITY: TRANSPORTATION INSECURITY
IN THE PAST 12 MONTHS, HAS LACK OF TRANSPORTATION KEPT YOU FROM MEETINGS, WORK, OR FROM GETTING THINGS NEEDED FOR DAILY LIVING?: PATIENT DECLINED

## 2021-12-06 SDOH — HEALTH STABILITY: PHYSICAL HEALTH
ON AVERAGE, HOW MANY DAYS PER WEEK DO YOU ENGAGE IN MODERATE TO STRENUOUS EXERCISE (LIKE A BRISK WALK)?: PATIENT DECLINED

## 2021-12-06 SDOH — HEALTH STABILITY: PHYSICAL HEALTH: ON AVERAGE, HOW MANY MINUTES DO YOU ENGAGE IN EXERCISE AT THIS LEVEL?: PATIENT DECLINED

## 2021-12-06 SDOH — ECONOMIC STABILITY: TRANSPORTATION INSECURITY
IN THE PAST 12 MONTHS, HAS THE LACK OF TRANSPORTATION KEPT YOU FROM MEDICAL APPOINTMENTS OR FROM GETTING MEDICATIONS?: PATIENT DECLINED

## 2021-12-06 SDOH — SOCIAL STABILITY: SOCIAL INSECURITY
WITHIN THE LAST YEAR, HAVE YOU BEEN HUMILIATED OR EMOTIONALLY ABUSED IN OTHER WAYS BY YOUR PARTNER OR EX-PARTNER?: PATIENT DECLINED

## 2021-12-06 SDOH — ECONOMIC STABILITY: FOOD INSECURITY: WITHIN THE PAST 12 MONTHS, THE FOOD YOU BOUGHT JUST DIDN'T LAST AND YOU DIDN'T HAVE MONEY TO GET MORE.: PATIENT DECLINED

## 2021-12-06 SDOH — SOCIAL STABILITY: SOCIAL INSECURITY: WITHIN THE LAST YEAR, HAVE YOU BEEN AFRAID OF YOUR PARTNER OR EX-PARTNER?: PATIENT DECLINED

## 2021-12-06 SDOH — HEALTH STABILITY: MENTAL HEALTH
STRESS IS WHEN SOMEONE FEELS TENSE, NERVOUS, ANXIOUS, OR CAN'T SLEEP AT NIGHT BECAUSE THEIR MIND IS TROUBLED. HOW STRESSED ARE YOU?: PATIENT DECLINED

## 2021-12-06 SDOH — ECONOMIC STABILITY: INCOME INSECURITY: IN THE LAST 12 MONTHS, WAS THERE A TIME WHEN YOU WERE NOT ABLE TO PAY THE MORTGAGE OR RENT ON TIME?: PATIENT REFUSED

## 2021-12-06 SDOH — ECONOMIC STABILITY: FOOD INSECURITY: WITHIN THE PAST 12 MONTHS, YOU WORRIED THAT YOUR FOOD WOULD RUN OUT BEFORE YOU GOT MONEY TO BUY MORE.: PATIENT DECLINED

## 2021-12-06 SDOH — HEALTH STABILITY: MENTAL HEALTH: HOW OFTEN DO YOU HAVE A DRINK CONTAINING ALCOHOL?: PATIENT DECLINED

## 2021-12-06 SDOH — SOCIAL STABILITY: SOCIAL NETWORK: IN A TYPICAL WEEK, HOW MANY TIMES DO YOU TALK ON THE PHONE WITH FAMILY, FRIENDS, OR NEIGHBORS?: PATIENT DECLINED

## 2021-12-06 SDOH — SOCIAL STABILITY: SOCIAL NETWORK: HOW OFTEN DO YOU GET TOGETHER WITH FRIENDS OR RELATIVES?: PATIENT DECLINED

## 2021-12-06 SDOH — ECONOMIC STABILITY: HOUSING INSECURITY
IN THE LAST 12 MONTHS, WAS THERE A TIME WHEN YOU DID NOT HAVE A STEADY PLACE TO SLEEP OR SLEPT IN A SHELTER (INCLUDING NOW)?: PATIENT REFUSED

## 2021-12-06 SDOH — SOCIAL STABILITY: SOCIAL INSECURITY
WITHIN THE LAST YEAR, HAVE YOU BEEN KICKED, HIT, SLAPPED, OR OTHERWISE PHYSICALLY HURT BY YOUR PARTNER OR EX-PARTNER?: PATIENT DECLINED

## 2021-12-06 SDOH — SOCIAL STABILITY: SOCIAL NETWORK: HOW OFTEN DO YOU ATTEND CHURCH OR RELIGIOUS SERVICES?: PATIENT DECLINED

## 2021-12-06 SDOH — SOCIAL STABILITY: SOCIAL NETWORK: ARE YOU MARRIED, WIDOWED, DIVORCED, SEPARATED, NEVER MARRIED, OR LIVING WITH A PARTNER?: PATIENT DECLINED

## 2021-12-06 ASSESSMENT — PATIENT HEALTH QUESTIONNAIRE - PHQ9
SUM OF ALL RESPONSES TO PHQ QUESTIONS 1-9: 0
1. LITTLE INTEREST OR PLEASURE IN DOING THINGS: 0
SUM OF ALL RESPONSES TO PHQ9 QUESTIONS 1 & 2: 0
2. FEELING DOWN, DEPRESSED OR HOPELESS: 0

## 2021-12-06 NOTE — PATIENT INSTRUCTIONS
1. Call Dr Calle  office, when next colon due. Please fax path and op note from [id number] colonoscopy to my office at [phone]  2. Please verify w your hematologist following tests done:  Factor V leiden, Prothrombin gene mutation, Protein C & S, AT III. I see the anticardiolipin abs from 11/2021  3. Please bring in last labs printout from this past summer, for my review  4. Dr Calixto Arellano Nephrology to work up rising creatinine and kidney inflammation seen on CTs [id number]  5. Eligible for Tdap Shingrix [id number] Influenza shots technically.

## 2022-01-17 ENCOUNTER — OFFICE VISIT (OUTPATIENT)
Dept: PRIMARY CARE CLINIC | Age: 67
End: 2022-01-17
Payer: MEDICARE

## 2022-01-17 VITALS
BODY MASS INDEX: 24.2 KG/M2 | HEIGHT: 69 IN | WEIGHT: 163.4 LBS | SYSTOLIC BLOOD PRESSURE: 137 MMHG | HEART RATE: 82 BPM | DIASTOLIC BLOOD PRESSURE: 73 MMHG | OXYGEN SATURATION: 98 % | RESPIRATION RATE: 17 BRPM | TEMPERATURE: 96.4 F

## 2022-01-17 DIAGNOSIS — Z00.00 ROUTINE ADULT HEALTH MAINTENANCE: ICD-10-CM

## 2022-01-17 DIAGNOSIS — N18.30 STAGE 3 CHRONIC KIDNEY DISEASE, UNSPECIFIED WHETHER STAGE 3A OR 3B CKD (HCC): ICD-10-CM

## 2022-01-17 DIAGNOSIS — R79.89 ELEVATED FERRITIN: ICD-10-CM

## 2022-01-17 DIAGNOSIS — E78.1 HYPERTRIGLYCERIDEMIA: ICD-10-CM

## 2022-01-17 DIAGNOSIS — Z23 NEED FOR VACCINATION: ICD-10-CM

## 2022-01-17 DIAGNOSIS — R35.1 NOCTURIA: Primary | ICD-10-CM

## 2022-01-17 DIAGNOSIS — Z23 NEED FOR DIPHTHERIA-TETANUS-PERTUSSIS (TDAP) VACCINE: ICD-10-CM

## 2022-01-17 PROCEDURE — 90694 VACC AIIV4 NO PRSRV 0.5ML IM: CPT | Performed by: INTERNAL MEDICINE

## 2022-01-17 PROCEDURE — 99215 OFFICE O/P EST HI 40 MIN: CPT | Performed by: INTERNAL MEDICINE

## 2022-01-17 PROCEDURE — G0008 ADMIN INFLUENZA VIRUS VAC: HCPCS | Performed by: INTERNAL MEDICINE

## 2022-01-17 RX ORDER — MULTIVITAMIN
1 TABLET ORAL EVERY OTHER DAY
COMMUNITY

## 2022-01-17 NOTE — PROGRESS NOTES
2022    Jayleen Brewer (:  1955) is a 78 y.o. male, here for evaluation of the following medical concerns:    No chief complaint on file. HPI  48-year-old male with history of seemingly unprovoked pulmonary embolism , distal LLE DVT 3/2021 (off a/c at presentation) now on lifelong anticoagulation, uncomplicated sigmoid diverticulitis , hypertriglyceridemia, history of GERD with possible esophagitis on CT no longer on acid suppressive therapy, who established care last month following longterm of his PCP. 25-pack-year smoker quit , but reportedly consumes 15 ounces of alcohol a week. He describes having a couple of beers 5 nights a week. Kidney function has been declining somewhat. CMP 1.0 2019, 1.2021 (1.2021) 1.2021. CT chest and CT abdomen in 2018 and 2019 comment on perinephric stranding bilaterally without hydronephrosis. Hemoglobin has been at the lower limit of normal 13.2 February 13.. Normal platelet WBC normal differential.  His hematologist organized a anemia work-up, elevated ferritin normal iron saturation B12 folate, no SPEP. He was referred to nephrology for his kidney disease slowly rising creatinine perinephric stranding on CT 7630-6095. Review of Systems   Constitutional: Negative for activity change, appetite change, fatigue and unexpected weight change. HENT: Negative for dental problem, sinus pain, sore throat and trouble swallowing. Eyes: Negative for pain and visual disturbance. Respiratory: Negative for apnea, cough, chest tightness, shortness of breath and wheezing. Cardiovascular: Negative for chest pain and palpitations. Gastrointestinal: Negative for abdominal pain, blood in stool, constipation, diarrhea, nausea, rectal pain and vomiting. Endocrine: Negative for cold intolerance, heat intolerance, polydipsia, polyphagia and polyuria.    Genitourinary: Negative for difficulty urinating, dysuria, flank pain, frequency, hematuria, pelvic pain, urgency, . Musculoskeletal: Negative for arthralgias, back pain, gait problem, joint swelling, myalgias, neck pain and neck stiffness. Skin: Negative for color change and rash. Neurological: Negative for dizziness, tremors, syncope, speech difficulty, weakness, light-headedness and headaches. Hematological: Negative for adenopathy. Does not bruise/bleed easily. Psychiatric/Behavioral: Negative for agitation, behavioral problems, decreased concentration, sleep disturbance and suicidal ideas. The patient is not nervous/anxious and is not hyperactive. Prior to Visit Medications    Medication Sig Taking? Authorizing Provider   Multiple Vitamin (MULTIVITAMIN) tablet Take 1 tablet by mouth daily Yes Historical Provider, MD   acetaminophen (APAP EXTRA STRENGTH) 500 MG tablet Take 1 tablet by mouth every 6 hours as needed for Pain Yes LATRELL Venegas   gemfibrozil (LOPID) 600 MG tablet Take 600 mg by mouth 2 times daily (before meals) Yes Historical Provider, MD   apixaban (ELIQUIS STARTER PACK) 5 MG TABS tablet Take 10 mg (2 tablets) orally twice daily for 7 days, then take 5 mg (1 tablet) orally twice daily thereafter. (1st dose given in ED) Yes LATRELL Pantoja        No Known Allergies    Past Medical History:   Diagnosis Date    DVT (deep venous thrombosis) ([facility] [address]     Hyperlipidemia        No past surgical history on file.     Social History     Socioeconomic History    Marital status:      Spouse name: Not on file    Number of children: Not on file    Years of education: Not on file    Highest education level: Not on file   Occupational History    Not on file   Tobacco Use    Smoking status: Never Smoker    Smokeless tobacco: Never Used   Vaping Use    Vaping Use: Never used   Substance and Sexual Activity    Alcohol use: Yes     Comment: occasionally    Drug use: Never    Sexual activity: Not on file   Other Topics Concern    Not on file   Social History Narrative    Not on file     Social Determinants of Health     Financial Resource Strain: Unknown    Difficulty of Paying Living Expenses: Patient refused   Food Insecurity: Unknown    Worried About Running Out of Food in the Last Year: Patient refused   [address] in the Last Year: Patient refused   Transportation Needs: Unknown    Lack of Transportation (Medical): Patient refused    Lack of Transportation (Non-Medical): Patient refused   Physical Activity: Unknown    Days of Exercise per Week: Patient refused    Minutes of Exercise per Session: Patient refused   Stress: Unknown    Feeling of Stress : Patient refused   Social Connections: Unknown    Frequency of Communication with Friends and Family: Patient refused    Frequency of Social Gatherings with Friends and Family: Patient refused    Attends Roman [employer] Services: Patient refused    Active Member of Clubs or Organizations: Not on file    Attends Club or Organization Meetings: Not on file    Marital Status: Patient refused   Intimate Partner Violence: Unknown    Fear of Current or Ex-Partner: Patient refused    Emotionally Abused: Patient refused    Physically Abused: Patient refused    Sexually Abused: Patient refused   Housing Stability: Unknown    Unable to Pay for Housing in the Last Year: Patient refused    Number of Places Lived in the Last Year: Not on file    Unstable Housing in the Last Year: Patient refused    lives at home with his wife. 2 grown children son and daughter. 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     Vitals:    01/17/22 0843   BP: 137/73   Site: Left Upper Arm   Position: Sitting   Cuff Size: Large Adult   Pulse: 82   Resp: 17   Temp: 96.4 °F (35.8 °C)   TempSrc: Temporal   SpO2: 98%   Weight: 163 lb 6.4 oz (74.1 kg)   Height: 5' 9\" (1.753 m)     Estimated body mass index is 24.13 kg/m² as calculated from the following:    Height as of this encounter: 5' 9\" (1.753 m). Weight as of this encounter: 163 lb 6.4 oz (74.1 kg). PHYSICAL EXAM  GENERAL:  Pleasant quiet  male who looks his stated age, awake alert and oriented x3, no acute distress. HEENT:  Normocephalic atraumatic. Pupils equal round reactive light and accommodation, extra ocular muscles are intact. Oropharynx is clear moist without injection or exudate. Tongue and palate move normally. Turbinates appear normal.  Tympanic membranes appear normal.  NECK:  Supple nontender. No carotid bruits. Brisk carotid upstrokes, no JVD. No thyromegaly. LYMPH:  No supraclavicular cervical axillary or inguinal lymphadenopathy. LUNGS:  Clear to auscultation bilaterally. Excellent air entry. No inspiratory crackles or expiratory wheezes. HEART:  Regular rate and rhythm without pathologic murmur rub gallop S3 or S4. ABDOMEN:  Soft, nontender. Normal bowel sounds. No guarding. No masses. UROGENITAL:  Deferred  EXTREMITIES:  Warm and well perfused without clubbing cyanosis or edema. 2+ pulses in all 4 extremities. Capillary refill less than 2 seconds. NEURO:  Cranial nerves 2-12 grossly intact. Normal muscle bulk and tone. No resting tremor, cogwheeling, normal rapid alternating movements in the hands and feet. No stocking paresthesia. Normal gait and station. MUSCULOSKELETAL:  Mild osteoarthritic changes. No active synovitis. SKIN:  No worrisome lesions, skin a little dry. Numerous SKs hemangiomas skin tags. PSYCH: Mild psychomotor retardation. Good eye contact. Slightly restricted affect range. Mood congruent with affect. Linear thought.     LABS  Lab Review   Admission on 03/25/2021, Discharged on 03/25/2021   Component Date Value    Ventricular Rate 03/25/2021 72     Atrial Rate 03/25/2021 72     P-R Interval 03/25/2021 162     QRS Duration 03/25/2021 86     Q-T Interval 03/25/2021 390     QTc Calculation (Bazett) 03/25/2021 427  P Axis 03/25/2021 44     R Axis 03/25/2021 -26     T Axis 03/25/2021 22     Diagnosis 03/25/2021 Normal sinus rhythmNormal ECGWhen compared with ECG of 15-MAY-2019 17:33,No significant change was foundConfirmed by AGAPITO DIOR MD ([id number]) on 3/26/2021 8:15:35 AM     WBC 03/25/2021 7.2     RBC 03/25/2021 4.17*    Hemoglobin 03/25/2021 13.3*    Hematocrit 03/25/2021 38.0*    MCV 03/25/2021 91.1     MCH 03/25/2021 31.9     MCHC 03/25/2021 35.0     RDW 03/25/2021 12.9     Platelets 08/82/6651 285     MPV 03/25/2021 8.1     Neutrophils % 03/25/2021 55.2     Lymphocytes % 03/25/2021 28.9     Monocytes % 03/25/2021 9.0     Eosinophils % 03/25/2021 6.1     Basophils % 03/25/2021 0.8     Neutrophils Absolute 03/25/2021 3.9     Lymphocytes Absolute 03/25/2021 2.1     Monocytes Absolute 03/25/2021 0.6     Eosinophils Absolute 03/25/2021 0.4     Basophils Absolute 03/25/2021 0.1     Sodium 03/25/2021 138     Potassium reflex Magnesi* 03/25/2021 4.7     Chloride 03/25/2021 103     CO2 03/25/2021 23     Anion Gap 03/25/2021 12     Glucose 03/25/2021 114*    BUN 03/25/2021 17     CREATININE 03/25/2021 1.1     GFR Non- 03/25/2021 >60     GFR  03/25/2021 >60     Calcium 03/25/2021 9.6     Protime 03/25/2021 13.7*    INR 03/25/2021 1.18*    aPTT 03/25/2021 36.4*    Troponin 03/25/2021 <0.01    Admission on 02/06/2021, Discharged on 02/06/2021   Component Date Value    WBC 02/06/2021 9.2     RBC 02/06/2021 4.32     Hemoglobin 02/06/2021 13.7     Hematocrit 02/06/2021 39.6*    MCV 02/06/2021 91.6     MCH 02/06/2021 31.6     MCHC 02/06/2021 34.5     RDW 02/06/2021 13.4     Platelets 57/79/7589 378     MPV 02/06/2021 7.7     Neutrophils % 02/06/2021 58.9     Lymphocytes % 02/06/2021 24.7     Monocytes % 02/06/2021 9.7     Eosinophils % 02/06/2021 5.8     Basophils % 02/06/2021 0.9     Neutrophils Absolute 02/06/2021 5.4     Lymphocytes Absolute 02/06/2021 2.3     Monocytes Absolute 02/06/2021 0.9     Eosinophils Absolute 02/06/2021 0.5     Basophils Absolute 02/06/2021 0.1     Sodium 02/06/2021 138     Potassium reflex Magnesi* 02/06/2021 4.6     Chloride 02/06/2021 100     CO2 02/06/2021 26     Anion Gap 02/06/2021 12     Glucose 02/06/2021 108*    BUN 02/06/2021 18     CREATININE 02/06/2021 1.4*    GFR Non- 02/06/2021 51*    GFR  02/06/2021 >60     Calcium 02/06/2021 10.0     Total CK 02/06/2021 217          ASSESSMENT/PLAN  1. Stage 3 chronic kidney disease, unspecified whether stage 3a or 3b CKD (HCC)  Creatinine has fluctuated between 1.0 and 1.4 since 2018. Side labs suggest gradually rising pattern. No chronic NSAID use at this time, but did use NSAIDs for a while earlier this year for his ankle pain. Gets up once at night to urinate. I am somewhat concerned about the abnormal appearance of his kidneys on CT abdomen in 2019, previously explained to patient that blood urine testing kidney imaging with attention paid to renal artery should be considered. Referred to nephrology. - Verito Marcano MD, Nephrology, [facility]    2. Recurrent acute deep vein thrombosis (DVT) of lower extremity, unspecified laterality [facility])  Patient has a left lower extremity DVT February 8, 2021, and an unprovoked PE without identified DVT in September 2018. ATIII, protein C&S prothrombin gene mutation factor V Leiden anticardiolipin antibody panel negative therefore patient has an unidentified thrombophilia in view of 2 unprovoked events and this physically active male. Both his parents were on Coumadin for unclear reasons, and he has children. Eldest daughter has not yet been pregnant. 3. Hypertriglyceridemia  Fatty liver disease but no history of pancreatitis though chronic alcohol consumption enhances risk. 2000 triglycerides 2840, and 2004 4697.   Since then triglycerides have ranged between 500-900. Update LFTs lipid panel. Consider addition Vascepa versus Lovaza as suboptimal control. Evidently took some form of fish oil without substantial benefit in the past.    It would be of interest to see what his triglycerides did off of beer. 4. Routine adult health maintenance  Eligible for Tdap Shingrix [id number] Influenza shots, declines all of them. Completed Covid booster #1 Pfizer. No AAA on CT. I was unable to find HIV or HCV screening in the outside labs. We will order these now. We will also update TSH vitamin D PSA A1c.    5. Hx of adenomatous colonic polyps  History of large 1 cm polyp 2014, appears to have had subsequent colonoscopy after uncomplicated diverticulitis in 0295-1102, and has upcoming colonoscopy within the next month. He is optimized for the procedure. He will need to hold Eliquis 48 hours prior to procedure. He can resume it 24 to 48 hours post procedure. 6. elevated ferritin. Normal iron saturation. No polycythemia. Recheck ferritin, and CRP. Return in about 3 months (around 4/17/2022). It was a pleasure to visit with  Ryann Eliseo today. Answered all questions as best I could. Zena Richards MD   Time 40 minutes. 20 minutes spent reviewing approximately 60 pages of outside records dating back to 2000.

## 2022-01-28 DIAGNOSIS — R35.1 NOCTURIA: ICD-10-CM

## 2022-01-28 DIAGNOSIS — N18.30 STAGE 3 CHRONIC KIDNEY DISEASE, UNSPECIFIED WHETHER STAGE 3A OR 3B CKD (HCC): ICD-10-CM

## 2022-01-28 DIAGNOSIS — R79.89 ELEVATED FERRITIN: ICD-10-CM

## 2022-01-28 DIAGNOSIS — E78.1 HYPERTRIGLYCERIDEMIA: ICD-10-CM

## 2022-01-28 DIAGNOSIS — Z00.00 ROUTINE ADULT HEALTH MAINTENANCE: ICD-10-CM

## 2022-01-28 LAB
ALT SERPL-CCNC: 29 U/L (ref 10–40)
ANION GAP SERPL CALCULATED.3IONS-SCNC: 13 MMOL/L (ref 3–16)
AST SERPL-CCNC: 22 U/L (ref 15–37)
BUN BLDV-MCNC: 18 MG/DL (ref 7–20)
C-REACTIVE PROTEIN: 3.4 MG/L (ref 0–5.1)
CALCIUM SERPL-MCNC: 9.7 MG/DL (ref 8.3–10.6)
CHLORIDE BLD-SCNC: 104 MMOL/L (ref 99–110)
CHOLESTEROL, TOTAL: 284 MG/DL (ref 0–199)
CO2: 24 MMOL/L (ref 21–32)
CREAT SERPL-MCNC: 1.5 MG/DL (ref 0.8–1.3)
ESTIMATED AVERAGE GLUCOSE: 102.5 MG/DL
FERRITIN: 250.9 NG/ML (ref 30–400)
GFR AFRICAN AMERICAN: 56
GFR NON-AFRICAN AMERICAN: 47
GLUCOSE BLD-MCNC: 105 MG/DL (ref 70–99)
HBA1C MFR BLD: 5.2 %
HDLC SERPL-MCNC: 28 MG/DL (ref 40–60)
HIV AG/AB: NORMAL
HIV ANTIGEN: NORMAL
HIV-1 ANTIBODY: NORMAL
HIV-2 AB: NORMAL
LDL CHOLESTEROL CALCULATED: ABNORMAL MG/DL
LDL CHOLESTEROL DIRECT: 148 MG/DL
POTASSIUM SERPL-SCNC: 5.3 MMOL/L (ref 3.5–5.1)
SODIUM BLD-SCNC: 141 MMOL/L (ref 136–145)
TRIGL SERPL-MCNC: 677 MG/DL (ref 0–150)
TSH REFLEX: 1.53 UIU/ML (ref 0.27–4.2)
VITAMIN D 25-HYDROXY: 22 NG/ML
VLDLC SERPL CALC-MCNC: ABNORMAL MG/DL

## 2022-01-30 LAB
HEPATITIS C VIRUS AB BY CIA INDEX: <0.02 IV
HEPATITIS C VIRUS AB BY CIA INTERPRETATION: NEGATIVE

## 2022-02-02 LAB
PROSTATE SPECIFIC ANTIGEN FREE: 0.3 UG/L
PROSTATE SPECIFIC ANTIGEN PERCENT FREE: 30 %
PROSTATE SPECIFIC ANTIGEN: 1 UG/L (ref 0–4)

## 2022-02-11 ENCOUNTER — HOSPITAL ENCOUNTER (OUTPATIENT)
Age: 67
Discharge: HOME | End: 2022-02-11
Payer: MEDICARE

## 2022-02-11 ENCOUNTER — HOSPITAL ENCOUNTER (OUTPATIENT)
Dept: ULTRASOUND IMAGING | Age: 67
Discharge: HOME | End: 2022-02-11
Payer: MEDICARE

## 2022-02-11 DIAGNOSIS — Z01.818 PREOP TESTING: ICD-10-CM

## 2022-02-11 DIAGNOSIS — N18.30 STAGE 3 CHRONIC KIDNEY DISEASE, UNSPECIFIED WHETHER STAGE 3A OR 3B CKD (HCC): ICD-10-CM

## 2022-02-11 LAB
A/G RATIO: 1.5 (ref 1.1–2.2)
ALBUMIN SERPL-MCNC: 4.6 G/DL (ref 3.4–5)
ALP BLD-CCNC: 59 U/L (ref 40–129)
ALT SERPL-CCNC: 34 U/L (ref 10–40)
ANION GAP SERPL CALCULATED.3IONS-SCNC: 14 MMOL/L (ref 3–16)
ANTI-NUCLEAR ANTIBODY (ANA): POSITIVE
AST SERPL-CCNC: 24 U/L (ref 15–37)
BASOPHILS ABSOLUTE: 0.1 K/UL (ref 0–0.2)
BASOPHILS RELATIVE PERCENT: 0.9 %
BILIRUB SERPL-MCNC: 0.4 MG/DL (ref 0–1)
BILIRUBIN URINE: NEGATIVE
BLOOD, URINE: NEGATIVE
BUN BLDV-MCNC: 16 MG/DL (ref 7–20)
CALCIUM SERPL-MCNC: 9.7 MG/DL (ref 8.3–10.6)
CHLORIDE BLD-SCNC: 100 MMOL/L (ref 99–110)
CLARITY: CLEAR
CO2: 23 MMOL/L (ref 21–32)
COLOR: YELLOW
CREAT SERPL-MCNC: 1 MG/DL (ref 0.8–1.3)
EOSINOPHILS ABSOLUTE: 0.4 K/UL (ref 0–0.6)
EOSINOPHILS RELATIVE PERCENT: 6.1 %
GFR AFRICAN AMERICAN: >60
GFR NON-AFRICAN AMERICAN: >60
GLUCOSE BLD-MCNC: 103 MG/DL (ref 70–99)
GLUCOSE URINE: NEGATIVE MG/DL
HCT VFR BLD CALC: 40.7 % (ref 40.5–52.5)
HEMOGLOBIN: 14.3 G/DL (ref 13.5–17.5)
KETONES, URINE: NEGATIVE MG/DL
LEUKOCYTE ESTERASE, URINE: NEGATIVE
LYMPHOCYTES ABSOLUTE: 1.7 K/UL (ref 1–5.1)
LYMPHOCYTES RELATIVE PERCENT: 29 %
MCH RBC QN AUTO: 32.4 PG (ref 26–34)
MCHC RBC AUTO-ENTMCNC: 35.2 G/DL (ref 31–36)
MCV RBC AUTO: 91.9 FL (ref 80–100)
MICROSCOPIC EXAMINATION: NORMAL
MONOCYTES ABSOLUTE: 0.7 K/UL (ref 0–1.3)
MONOCYTES RELATIVE PERCENT: 11.7 %
NEUTROPHILS ABSOLUTE: 3.1 K/UL (ref 1.7–7.7)
NEUTROPHILS RELATIVE PERCENT: 52.3 %
NITRITE, URINE: NEGATIVE
PARATHYROID HORMONE INTACT: 47.5 PG/ML (ref 14–72)
PDW BLD-RTO: 13 % (ref 12.4–15.4)
PH UA: 6 (ref 5–8)
PHOSPHORUS: 2.6 MG/DL (ref 2.5–4.9)
PLATELET # BLD: 331 K/UL (ref 135–450)
PMV BLD AUTO: 8 FL (ref 5–10.5)
POTASSIUM SERPL-SCNC: 4.6 MMOL/L (ref 3.5–5.1)
PROTEIN PROTEIN: 0.01 G/DL
PROTEIN PROTEIN: 13 MG/DL
PROTEIN UA: NEGATIVE MG/DL
RBC # BLD: 4.43 M/UL (ref 4.2–5.9)
SARS-COV-2, NAAT: NOT DETECTED
SODIUM BLD-SCNC: 137 MMOL/L (ref 136–145)
SPECIFIC GRAVITY UA: 1.02 (ref 1–1.03)
TOTAL PROTEIN: 7.7 G/DL (ref 6.4–8.2)
URIC ACID, SERUM: 7.7 MG/DL (ref 3.5–7.2)
URINE TYPE: NORMAL
UROBILINOGEN, URINE: 0.2 E.U./DL
WBC # BLD: 5.9 K/UL (ref 4–11)

## 2022-02-11 PROCEDURE — 84166 PROTEIN E-PHORESIS/URINE/CSF: CPT

## 2022-02-11 PROCEDURE — 84155 ASSAY OF PROTEIN SERUM: CPT

## 2022-02-11 PROCEDURE — 86039 ANTINUCLEAR ANTIBODIES (ANA): CPT

## 2022-02-11 PROCEDURE — 81003 URINALYSIS AUTO W/O SCOPE: CPT

## 2022-02-11 PROCEDURE — 36415 COLL VENOUS BLD VENIPUNCTURE: CPT

## 2022-02-11 PROCEDURE — 84100 ASSAY OF PHOSPHORUS: CPT

## 2022-02-11 PROCEDURE — 80053 COMPREHEN METABOLIC PANEL: CPT

## 2022-02-11 PROCEDURE — 86334 IMMUNOFIX E-PHORESIS SERUM: CPT

## 2022-02-11 PROCEDURE — 76770 US EXAM ABDO BACK WALL COMP: CPT

## 2022-02-11 PROCEDURE — 86038 ANTINUCLEAR ANTIBODIES: CPT

## 2022-02-11 PROCEDURE — 86335 IMMUNFIX E-PHORSIS/URINE/CSF: CPT

## 2022-02-11 PROCEDURE — 85025 COMPLETE CBC W/AUTO DIFF WBC: CPT

## 2022-02-11 PROCEDURE — 83970 ASSAY OF PARATHORMONE: CPT

## 2022-02-11 PROCEDURE — 84550 ASSAY OF BLOOD/URIC ACID: CPT

## 2022-02-11 PROCEDURE — 87635 SARS-COV-2 COVID-19 AMP PRB: CPT

## 2022-02-11 PROCEDURE — 84156 ASSAY OF PROTEIN URINE: CPT

## 2022-02-11 PROCEDURE — 84165 PROTEIN E-PHORESIS SERUM: CPT

## 2022-02-14 LAB
ALBUMIN SERPL-MCNC: 4 G/DL (ref 3.1–4.9)
ALPHA-1-GLOBULIN: 0.3 G/DL (ref 0.2–0.4)
ALPHA-2-GLOBULIN: 1 G/DL (ref 0.4–1.1)
BETA GLOBULIN: 1.2 G/DL (ref 0.9–1.6)
GAMMA GLOBULIN: 1.3 G/DL (ref 0.6–1.8)

## 2022-02-15 ENCOUNTER — HOSPITAL ENCOUNTER (OUTPATIENT)
Age: 67
Setting detail: OUTPATIENT SURGERY
Discharge: HOME | End: 2022-02-15
Attending: SURGERY | Admitting: SURGERY
Payer: MEDICARE

## 2022-02-15 ENCOUNTER — ANESTHESIA EVENT (OUTPATIENT)
Dept: ENDOSCOPY | Age: 67
End: 2022-02-15
Payer: MEDICARE

## 2022-02-15 ENCOUNTER — ANESTHESIA (OUTPATIENT)
Dept: ENDOSCOPY | Age: 67
End: 2022-02-15
Payer: MEDICARE

## 2022-02-15 VITALS
DIASTOLIC BLOOD PRESSURE: 59 MMHG | SYSTOLIC BLOOD PRESSURE: 101 MMHG | RESPIRATION RATE: 20 BRPM | OXYGEN SATURATION: 100 %

## 2022-02-15 VITALS
HEIGHT: 68 IN | SYSTOLIC BLOOD PRESSURE: 132 MMHG | WEIGHT: 155 LBS | DIASTOLIC BLOOD PRESSURE: 79 MMHG | OXYGEN SATURATION: 99 % | TEMPERATURE: 97.7 F | BODY MASS INDEX: 23.49 KG/M2 | RESPIRATION RATE: 16 BRPM | HEART RATE: 64 BPM

## 2022-02-15 DIAGNOSIS — Z01.818 PREOP TESTING: Primary | ICD-10-CM

## 2022-02-15 LAB
ANA PATTERN: ABNORMAL
ANA TITER: ABNORMAL
ANTINUCLEAR AB INTERPRETIVE COMMENT: ABNORMAL
ANTINUCLEAR ANTIBODY, HEP-2, IGG: DETECTED

## 2022-02-15 PROCEDURE — 2709999900 HC NON-CHARGEABLE SUPPLY: Performed by: SURGERY

## 2022-02-15 PROCEDURE — 2500000003 HC RX 250 WO HCPCS: Performed by: REGISTERED NURSE

## 2022-02-15 PROCEDURE — 88305 TISSUE EXAM BY PATHOLOGIST: CPT

## 2022-02-15 PROCEDURE — 7100000010 HC PHASE II RECOVERY - FIRST 15 MIN: Performed by: SURGERY

## 2022-02-15 PROCEDURE — 3700000000 HC ANESTHESIA ATTENDED CARE: Performed by: SURGERY

## 2022-02-15 PROCEDURE — 2580000003 HC RX 258: Performed by: REGISTERED NURSE

## 2022-02-15 PROCEDURE — 6360000002 HC RX W HCPCS: Performed by: REGISTERED NURSE

## 2022-02-15 PROCEDURE — 3700000001 HC ADD 15 MINUTES (ANESTHESIA): Performed by: SURGERY

## 2022-02-15 PROCEDURE — 3609010600 HC COLONOSCOPY POLYPECTOMY SNARE/COLD BIOPSY: Performed by: SURGERY

## 2022-02-15 PROCEDURE — 7100000011 HC PHASE II RECOVERY - ADDTL 15 MIN: Performed by: SURGERY

## 2022-02-15 PROCEDURE — 3609010300 HC COLONOSCOPY W/BIOPSY SINGLE/MULTIPLE: Performed by: SURGERY

## 2022-02-15 PROCEDURE — 2580000003 HC RX 258: Performed by: ANESTHESIOLOGY

## 2022-02-15 RX ORDER — SODIUM CHLORIDE 9 MG/ML
INJECTION, SOLUTION INTRAVENOUS CONTINUOUS
Status: DISCONTINUED | OUTPATIENT
Start: 2022-02-15 | End: 2022-02-15 | Stop reason: HOSPADM

## 2022-02-15 RX ORDER — LIDOCAINE HYDROCHLORIDE 20 MG/ML
INJECTION, SOLUTION EPIDURAL; INFILTRATION; INTRACAUDAL; PERINEURAL PRN
Status: DISCONTINUED | OUTPATIENT
Start: 2022-02-15 | End: 2022-02-15 | Stop reason: SDUPTHER

## 2022-02-15 RX ORDER — PROPOFOL 10 MG/ML
INJECTION, EMULSION INTRAVENOUS PRN
Status: DISCONTINUED | OUTPATIENT
Start: 2022-02-15 | End: 2022-02-15 | Stop reason: SDUPTHER

## 2022-02-15 RX ORDER — SODIUM CHLORIDE 9 MG/ML
INJECTION, SOLUTION INTRAVENOUS CONTINUOUS PRN
Status: DISCONTINUED | OUTPATIENT
Start: 2022-02-15 | End: 2022-02-15 | Stop reason: SDUPTHER

## 2022-02-15 RX ORDER — PROPOFOL 10 MG/ML
INJECTION, EMULSION INTRAVENOUS CONTINUOUS PRN
Status: DISCONTINUED | OUTPATIENT
Start: 2022-02-15 | End: 2022-02-15 | Stop reason: SDUPTHER

## 2022-02-15 RX ADMIN — PROPOFOL 140 MCG/KG/MIN: 10 INJECTION, EMULSION INTRAVENOUS at 11:41

## 2022-02-15 RX ADMIN — PHENYLEPHRINE HYDROCHLORIDE 50 MCG: 10 INJECTION INTRAVENOUS at 12:12

## 2022-02-15 RX ADMIN — LIDOCAINE HYDROCHLORIDE 60 MG: 20 INJECTION, SOLUTION EPIDURAL; INFILTRATION; INTRACAUDAL; PERINEURAL at 11:41

## 2022-02-15 RX ADMIN — PROPOFOL 30 MG: 10 INJECTION, EMULSION INTRAVENOUS at 11:49

## 2022-02-15 RX ADMIN — PROPOFOL 20 MG: 10 INJECTION, EMULSION INTRAVENOUS at 11:59

## 2022-02-15 RX ADMIN — SODIUM CHLORIDE: 9 INJECTION, SOLUTION INTRAVENOUS at 11:37

## 2022-02-15 RX ADMIN — SODIUM CHLORIDE: 9 INJECTION, SOLUTION INTRAVENOUS at 11:32

## 2022-02-15 RX ADMIN — PROPOFOL 70 MG: 10 INJECTION, EMULSION INTRAVENOUS at 11:41

## 2022-02-15 ASSESSMENT — PULMONARY FUNCTION TESTS
PIF_VALUE: 1

## 2022-02-15 ASSESSMENT — PAIN - FUNCTIONAL ASSESSMENT: PAIN_FUNCTIONAL_ASSESSMENT: 0-10

## 2022-02-15 ASSESSMENT — PAIN SCALES - GENERAL
PAINLEVEL_OUTOF10: 0

## 2022-02-15 NOTE — ANESTHESIA POSTPROCEDURE EVALUATION
Department of Anesthesiology  Postprocedure Note    Patient: Jase Hill  MRN: [id number]  YOB: 1955  Date of evaluation: 2/15/2022  Time:  12:29 PM     Procedure Summary     Date: 02/15/22 Room / Location: [address] [place] [address]    Anesthesia Start: 1137 Anesthesia Stop: 3827    Procedure: COLONOSCOPY POLYPECTOMY SNARE/COLD BIOPSY (N/A ) Diagnosis: (SCREENING Z12.11)    Surgeons: Duane Dominguez MD Responsible Provider: Sue Matamoros MD    Anesthesia Type: MAC ASA Status: 2          Anesthesia Type: MAC    James Phase I: James Score: 10    James Phase II:      Last vitals: Reviewed and per EMR flowsheets.        Anesthesia Post Evaluation    Patient location during evaluation: PACU  Patient participation: complete - patient participated  Level of consciousness: awake  Airway patency: patent  Nausea & Vomiting: no vomiting and no nausea  Complications: no  Cardiovascular status: hemodynamically stable  Respiratory status: acceptable  Hydration status: stable  Multimodal analgesia pain management approach

## 2022-02-15 NOTE — BRIEF OP NOTE
Brief Postoperative Note      Patient: Arabella Vázquez  YOB: 1955  MRN: [id number]    Date of Procedure: 2/15/2022    Pre-Op Diagnosis: SCREENING Z12.11    Post-Op Diagnosis: Same  Plus colon polyps annd diverticulosis of the colon       Procedure(s):  COLONOSCOPY POLYPECTOMY SNARE/COLD BIOPSY  Snare polypectomies of transverse colon and descending colon  Biopsy polypectomies of transverse colon and rectum  Surgeon(s):  Narcisa Hess MD    Assistant:  * No surgical staff found *    Anesthesia: Monitor Anesthesia Care    Estimated Blood Loss (mL): Minimal    Complications: None    Specimens:   ID Type Source Tests Collected by Time Destination   A : polyps transverse x2, descending x1, sigmoid x1 Tissue Colon SURGICAL PATHOLOGY Martha Srivastava RN 2/15/2022 1203        Implants:  * No implants in log *      Drains: * No LDAs found *    Findings: as above    Electronically signed by Narcisa Hess MD on 2/15/2022 at 12:20 PM

## 2022-02-15 NOTE — PROGRESS NOTES
Post-procedure education reviewed with patient and visitor, and they verbalized understanding.
See anesthesia record for Propofol dosages and vital signs.
To endo recovery from procedure room. VSS, awakens to voice, respirations easy and unlabored.
Other________________________________________            Brenda Mo POLICY(subject to change)         There is a one visitor policy at [facility] for all surgeries and endoscopies. Whether the visitor can stay or will be asked to wait in the car will depend on the current policy and if social distancing can be maintained. The policy is subject to change at any time. Please make sure the visitor has a cell phone that is on,charged and able to accept calls, as this may be the way that the staff communicates with them. Pain management is NO VISITOR policyThe patients ride is expected to remain in the car with a cell phone for communication. If the ride is leaving the hospital grounds please make sure they are back in time for pickup. Have the patient inform the staff on arrival what their rides plans are while the patient is in the facility. At the MAIN there is one visitor allowed. Please note that the visitor policy is subject to change.

## 2022-02-15 NOTE — H&P
Subjective:     Cuca Fulton is a 78 y.o. male who was referred for evaluation of previous adenomatous polyps. Patient's medications, allergies, past medical, surgical, social and family histories were reviewed and updated as appropriate. Past medical history:  has a past medical history of Diverticulitis, DVT (deep venous thrombosis) ([address] and Hyperlipidemia. Past surgical history:  has no past surgical history on file. Past social history:  reports that he has quit smoking. He has never used smokeless tobacco. He reports current alcohol use of about 2.0 standard drinks of alcohol per week. He reports that he does not use drugs. Past family history: family history is not on file. Allergies: No Known Allergies  Current medications:   Current Facility-Administered Medications:     0.9 % sodium chloride infusion, , IntraVENous, Continuous, Rogers Whyte MD    Review of Systems  A comprehensive review of systems was negative. Objective:     BP (!) 141/69   Pulse 69   Temp 96.2 °F (35.7 °C) (Temporal)   Resp 16   Ht 5' 8\" (1.727 m)   Wt 155 lb (70.3 kg)   SpO2 99%   BMI 23.57 kg/m²   General appearance: alert, appears stated age and cooperative  Eyes: conjunctivae/corneas clear. PERRL, EOM's intact. Fundi benign. Ears: normal TM's and external ear canals both ears  Heart: regular rate and rhythm, S1, S2 normal, no murmur, click, rub or gallop  Abdomen: soft, non-tender; bowel sounds normal; no masses,  no organomegaly  Lungs: clear to auscultation bilaterally  Rectal: normal tone    Plan:     1. Colonoscopy.

## 2022-02-15 NOTE — PROCEDURES
[facility] [address]                     [address] [facility], [address] [facility]                               COLONOSCOPY REPORT    PATIENT NAME: Ora Rivas                   :        1955  MED REC NO:   [id number]                          ROOM:  ACCOUNT NO:   [de-identified]                           ADMIT DATE: 02/15/2022  PROVIDER:     Yamilex Zamarripa MD    DATE OF PROCEDURE:  02/15/2022    PREOPERATIVE DIAGNOSIS:  History of colon polyps. POSTOPERATIVE DIAGNOSES:  Colon polyps and diverticulosis of the colon. PROCEDURE:  1.  Fiberoptic colonoscopy up to cecum with snare polypectomy of the  transverse colon and descending colon. 2.  Biopsy polypectomy of the transverse colon and rectum. SURGEON:  Yamilex Zamarripa MD    SEDATION:  MAC. BLOOD LOSS:  Minimal.    OPERATIVE PROCEDURE:  The patient was placed in the left lateral  position. Olympus colonoscope was inserted all the way up to the cecum. Cecum and ileocecal valve were within normal limits. He had an 8 mm  polyp in the proximal transverse colon which was removed by biopsy  forceps. Then, in the distal transverse colon there was a 1.3 cm polyp  seen which was removed by electrocautery snare. In the descending  colon, a similar polyp was seen also removed by electrocautery snare. In the rectum, there was an 8 mm polyp removed by biopsy forceps. He  had diverticulosis of the colon. No cancer was seen. Colon mucosa was  normal.  His prep was good. He tolerated the procedure well and left  the endoscopy room in satisfactory condition.         Zacarias Cummings MD    D: 02/15/2022 12:33:32       T: 02/15/2022 13:19:23     [id number]  Job#: [id number]     Doc#: [id number]    CC:  Daria Chowdary Md

## 2022-02-15 NOTE — ANESTHESIA PRE PROCEDURE
Department of Anesthesiology  Preprocedure Note       Name:  Harold Goldberg   Age:  78 y.o.  :  1955                                          MRN:  [id number]         Date:  2/15/2022      Surgeon: Lanny Herrera):  Juan A Wells MD    Procedure: Procedure(s):  COLONOSCOPY DIAGNOSTIC    Medications prior to admission:   Prior to Admission medications    Medication Sig Start Date End Date Taking? Authorizing Provider   Multiple Vitamin (MULTIVITAMIN) tablet Take 1 tablet by mouth daily    Historical Provider, MD   acetaminophen (APAP EXTRA STRENGTH) 500 MG tablet Take 1 tablet by mouth every 6 hours as needed for Pain 5/15/19   LATRELL Catalan   gemfibrozil (LOPID) 600 MG tablet Take 600 mg by mouth 2 times daily (before meals)    Historical Provider, MD   apixaban (ELIQUIS STARTER PACK) 5 MG TABS tablet Take 10 mg (2 tablets) orally twice daily for 7 days, then take 5 mg (1 tablet) orally twice daily thereafter. (1st dose given in ED) 9/15/18   LATRELL Catalan       Current medications:    No current facility-administered medications for this encounter. Allergies:  No Known Allergies    Problem List:  There is no problem list on file for this patient. Past Medical History:        Diagnosis Date    DVT (deep venous thrombosis) (HCC)     Hyperlipidemia        Past Surgical History:  No past surgical history on file. Social History:    Social History     Tobacco Use    Smoking status: Never Smoker    Smokeless tobacco: Never Used   Substance Use Topics    Alcohol use: Yes     Comment: occasionally                                Counseling given: Not Answered      Vital Signs (Current): There were no vitals filed for this visit.                                            BP Readings from Last 3 Encounters:   22 137/73   21 137/73   21 (!) 149/60       NPO Status:                                                                                 BMI:   Wt Readings from Last 3 Encounters:   01/17/22 163 lb 6.4 oz (74.1 kg)   12/06/21 162 lb (73.5 kg)   02/06/21 164 lb 0.4 oz (74.4 kg)     There is no height or weight on file to calculate BMI.    CBC:   Lab Results   Component Value Date    WBC 5.9 02/11/2022    RBC 4.43 02/11/2022    HGB 14.3 02/11/2022    HCT 40.7 02/11/2022    MCV 91.9 02/11/2022    RDW 13.0 02/11/2022     02/11/2022       CMP:   Lab Results   Component Value Date     02/11/2022    K 4.6 02/11/2022    K 4.7 03/25/2021     02/11/2022    CO2 23 02/11/2022    BUN 16 02/11/2022    CREATININE 1.0 02/11/2022    GFRAA >60 02/11/2022    AGRATIO 1.5 02/11/2022    LABGLOM >60 02/11/2022    GLUCOSE 103 02/11/2022    PROT 7.7 02/11/2022    CALCIUM 9.7 02/11/2022    BILITOT 0.4 02/11/2022    ALKPHOS 59 02/11/2022    AST 24 02/11/2022    ALT 34 02/11/2022       POC Tests: No results for input(s): POCGLU, POCNA, POCK, POCCL, POCBUN, POCHEMO, POCHCT in the last 72 hours. Coags:   Lab Results   Component Value Date    PROTIME 13.7 03/25/2021    INR 1.18 03/25/2021    APTT 36.4 03/25/2021       HCG (If Applicable): No results found for: PREGTESTUR, PREGSERUM, HCG, HCGQUANT     ABGs: No results found for: PHART, PO2ART, VKR9DTR, MHE7EAB, BEART, I0MTWMUL     Type & Screen (If Applicable):  No results found for: LABABO, LABRH    Drug/Infectious Status (If Applicable):  No results found for: HIV, HEPCAB    COVID-19 Screening (If Applicable):   Lab Results   Component Value Date    COVID19 Not Detected 02/11/2022           Anesthesia Evaluation  Patient summary reviewed and Nursing notes reviewed  Airway: Mallampati: II        Dental:          Pulmonary:                              Cardiovascular:    (+) hyperlipidemia                  Neuro/Psych:               GI/Hepatic/Renal:             Endo/Other:                     Abdominal:             Vascular:   + DVT, .        Other Findings:             Anesthesia Plan      MAC     ASA 2 Larissa Pierre MD   2/15/2022

## 2022-02-16 LAB — SPE/IFE INTERPRETATION: NORMAL

## 2022-02-21 ENCOUNTER — TELEPHONE (OUTPATIENT)
Dept: PRIMARY CARE CLINIC | Age: 67
End: 2022-02-21

## 2022-02-21 NOTE — TELEPHONE ENCOUNTER
[facility]  Appointment made for [facility] after appt w Dr Teran Howard on 04/20/2022

## 2022-03-03 LAB — URINE ELECTROPHORESIS INTERP: NORMAL

## 2022-03-23 PROBLEM — I82.409 DVT (DEEP VENOUS THROMBOSIS) (HCC): Status: ACTIVE | Noted: 2022-03-23

## 2022-03-23 PROBLEM — N18.30 CKD (CHRONIC KIDNEY DISEASE) STAGE 3, GFR 30-59 ML/MIN (HCC): Status: ACTIVE | Noted: 2022-01-28

## 2022-03-23 PROBLEM — E78.1 HYPERTRIGLYCERIDEMIA: Status: ACTIVE | Noted: 2022-03-23

## 2022-04-03 ENCOUNTER — HOSPITAL ENCOUNTER (EMERGENCY)
Age: 67
Discharge: HOME | End: 2022-04-03
Payer: MEDICARE

## 2022-04-03 ENCOUNTER — APPOINTMENT (OUTPATIENT)
Dept: GENERAL RADIOLOGY | Age: 67
End: 2022-04-03
Payer: MEDICARE

## 2022-04-03 ENCOUNTER — APPOINTMENT (OUTPATIENT)
Dept: CT IMAGING | Age: 67
End: 2022-04-03
Payer: MEDICARE

## 2022-04-03 VITALS
RESPIRATION RATE: 16 BRPM | SYSTOLIC BLOOD PRESSURE: 123 MMHG | HEART RATE: 96 BPM | DIASTOLIC BLOOD PRESSURE: 64 MMHG | TEMPERATURE: 99 F | WEIGHT: 165.57 LBS | BODY MASS INDEX: 24.52 KG/M2 | OXYGEN SATURATION: 95 % | HEIGHT: 69 IN

## 2022-04-03 DIAGNOSIS — R53.1 GENERALIZED WEAKNESS: ICD-10-CM

## 2022-04-03 DIAGNOSIS — N39.0 URINARY TRACT INFECTION WITH HEMATURIA, SITE UNSPECIFIED: Primary | ICD-10-CM

## 2022-04-03 DIAGNOSIS — R31.9 URINARY TRACT INFECTION WITH HEMATURIA, SITE UNSPECIFIED: Primary | ICD-10-CM

## 2022-04-03 DIAGNOSIS — E86.0 DEHYDRATION: ICD-10-CM

## 2022-04-03 LAB
A/G RATIO: 1.1 (ref 1.1–2.2)
ALBUMIN SERPL-MCNC: 4 G/DL (ref 3.4–5)
ALP BLD-CCNC: 69 U/L (ref 40–129)
ALT SERPL-CCNC: 17 U/L (ref 10–40)
ANION GAP SERPL CALCULATED.3IONS-SCNC: 16 MMOL/L (ref 3–16)
AST SERPL-CCNC: 15 U/L (ref 15–37)
BACTERIA: ABNORMAL /HPF
BASOPHILS ABSOLUTE: 0 K/UL (ref 0–0.2)
BASOPHILS RELATIVE PERCENT: 0.3 %
BILIRUB SERPL-MCNC: 1.1 MG/DL (ref 0–1)
BILIRUBIN URINE: NEGATIVE
BLOOD, URINE: ABNORMAL
BUN BLDV-MCNC: 26 MG/DL (ref 7–20)
CALCIUM SERPL-MCNC: 9.7 MG/DL (ref 8.3–10.6)
CHLORIDE BLD-SCNC: 95 MMOL/L (ref 99–110)
CLARITY: ABNORMAL
CO2: 21 MMOL/L (ref 21–32)
COLOR: YELLOW
CREAT SERPL-MCNC: 1.4 MG/DL (ref 0.8–1.3)
EOSINOPHILS ABSOLUTE: 0 K/UL (ref 0–0.6)
EOSINOPHILS RELATIVE PERCENT: 0.1 %
EPITHELIAL CELLS, UA: 1 /HPF (ref 0–5)
GFR AFRICAN AMERICAN: >60
GFR NON-AFRICAN AMERICAN: 50
GLUCOSE BLD-MCNC: 135 MG/DL (ref 70–99)
GLUCOSE URINE: NEGATIVE MG/DL
HCT VFR BLD CALC: 37.7 % (ref 40.5–52.5)
HEMOGLOBIN: 13 G/DL (ref 13.5–17.5)
HYALINE CASTS: 6 /LPF (ref 0–8)
KETONES, URINE: ABNORMAL MG/DL
LACTIC ACID, SEPSIS: 1.1 MMOL/L (ref 0.4–1.9)
LACTIC ACID, SEPSIS: 2 MMOL/L (ref 0.4–1.9)
LEUKOCYTE ESTERASE, URINE: ABNORMAL
LYMPHOCYTES ABSOLUTE: 0.6 K/UL (ref 1–5.1)
LYMPHOCYTES RELATIVE PERCENT: 3.9 %
MCH RBC QN AUTO: 31.8 PG (ref 26–34)
MCHC RBC AUTO-ENTMCNC: 34.6 G/DL (ref 31–36)
MCV RBC AUTO: 91.8 FL (ref 80–100)
MICROSCOPIC EXAMINATION: YES
MONOCYTES ABSOLUTE: 1.4 K/UL (ref 0–1.3)
MONOCYTES RELATIVE PERCENT: 8.7 %
NEUTROPHILS ABSOLUTE: 13.7 K/UL (ref 1.7–7.7)
NEUTROPHILS RELATIVE PERCENT: 87 %
NITRITE, URINE: POSITIVE
PDW BLD-RTO: 12.6 % (ref 12.4–15.4)
PH UA: 5.5 (ref 5–8)
PLATELET # BLD: 248 K/UL (ref 135–450)
PMV BLD AUTO: 7.7 FL (ref 5–10.5)
POTASSIUM REFLEX MAGNESIUM: 4 MMOL/L (ref 3.5–5.1)
PROTEIN UA: 100 MG/DL
RAPID INFLUENZA  B AGN: NEGATIVE
RAPID INFLUENZA A AGN: NEGATIVE
RBC # BLD: 4.1 M/UL (ref 4.2–5.9)
RBC UA: 3 /HPF (ref 0–4)
SODIUM BLD-SCNC: 132 MMOL/L (ref 136–145)
SPECIFIC GRAVITY UA: >=1.03 (ref 1–1.03)
TOTAL PROTEIN: 7.8 G/DL (ref 6.4–8.2)
TROPONIN: <0.01 NG/ML
URINE REFLEX TO CULTURE: YES
URINE TYPE: ABNORMAL
UROBILINOGEN, URINE: 1 E.U./DL
WBC # BLD: 15.7 K/UL (ref 4–11)
WBC UA: 476 /HPF (ref 0–5)

## 2022-04-03 PROCEDURE — 84484 ASSAY OF TROPONIN QUANT: CPT

## 2022-04-03 PROCEDURE — 87040 BLOOD CULTURE FOR BACTERIA: CPT

## 2022-04-03 PROCEDURE — 87804 INFLUENZA ASSAY W/OPTIC: CPT

## 2022-04-03 PROCEDURE — 87086 URINE CULTURE/COLONY COUNT: CPT

## 2022-04-03 PROCEDURE — 6360000004 HC RX CONTRAST MEDICATION: Performed by: GENERAL ACUTE CARE HOSPITAL

## 2022-04-03 PROCEDURE — 6370000000 HC RX 637 (ALT 250 FOR IP): Performed by: GENERAL ACUTE CARE HOSPITAL

## 2022-04-03 PROCEDURE — 99283 EMERGENCY DEPT VISIT LOW MDM: CPT

## 2022-04-03 PROCEDURE — 2580000003 HC RX 258: Performed by: GENERAL ACUTE CARE HOSPITAL

## 2022-04-03 PROCEDURE — 85025 COMPLETE CBC W/AUTO DIFF WBC: CPT

## 2022-04-03 PROCEDURE — 93005 ELECTROCARDIOGRAM TRACING: CPT | Performed by: GENERAL ACUTE CARE HOSPITAL

## 2022-04-03 PROCEDURE — 36415 COLL VENOUS BLD VENIPUNCTURE: CPT

## 2022-04-03 PROCEDURE — 71045 X-RAY EXAM CHEST 1 VIEW: CPT

## 2022-04-03 PROCEDURE — 83605 ASSAY OF LACTIC ACID: CPT

## 2022-04-03 PROCEDURE — 81001 URINALYSIS AUTO W/SCOPE: CPT

## 2022-04-03 PROCEDURE — 87077 CULTURE AEROBIC IDENTIFY: CPT

## 2022-04-03 PROCEDURE — 71260 CT THORAX DX C+: CPT

## 2022-04-03 PROCEDURE — 80053 COMPREHEN METABOLIC PANEL: CPT

## 2022-04-03 PROCEDURE — 87186 SC STD MICRODIL/AGAR DIL: CPT

## 2022-04-03 RX ORDER — 0.9 % SODIUM CHLORIDE 0.9 %
1000 INTRAVENOUS SOLUTION INTRAVENOUS ONCE
Status: COMPLETED | OUTPATIENT
Start: 2022-04-03 | End: 2022-04-03

## 2022-04-03 RX ORDER — CIPROFLOXACIN 500 MG/1
500 TABLET, FILM COATED ORAL 2 TIMES DAILY
Qty: 14 TABLET | Refills: 0 | Status: SHIPPED | OUTPATIENT
Start: 2022-04-03 | End: 2022-04-10

## 2022-04-03 RX ORDER — CIPROFLOXACIN 500 MG/1
500 TABLET, FILM COATED ORAL ONCE
Status: COMPLETED | OUTPATIENT
Start: 2022-04-03 | End: 2022-04-03

## 2022-04-03 RX ORDER — 0.9 % SODIUM CHLORIDE 0.9 %
500 INTRAVENOUS SOLUTION INTRAVENOUS ONCE
Status: COMPLETED | OUTPATIENT
Start: 2022-04-03 | End: 2022-04-03

## 2022-04-03 RX ORDER — ACETAMINOPHEN 500 MG
1000 TABLET ORAL ONCE
Status: COMPLETED | OUTPATIENT
Start: 2022-04-03 | End: 2022-04-03

## 2022-04-03 RX ORDER — CIPROFLOXACIN 500 MG/1
500 TABLET, FILM COATED ORAL 2 TIMES DAILY
Qty: 14 TABLET | Refills: 0 | Status: SHIPPED | OUTPATIENT
Start: 2022-04-03 | End: 2022-04-03 | Stop reason: SDUPTHER

## 2022-04-03 RX ADMIN — SODIUM CHLORIDE 1000 ML: 9 INJECTION, SOLUTION INTRAVENOUS at 14:24

## 2022-04-03 RX ADMIN — SODIUM CHLORIDE 1000 ML: 9 INJECTION, SOLUTION INTRAVENOUS at 13:00

## 2022-04-03 RX ADMIN — CIPROFLOXACIN 500 MG: 500 TABLET, FILM COATED ORAL at 17:01

## 2022-04-03 RX ADMIN — ACETAMINOPHEN 1000 MG: 500 TABLET ORAL at 12:48

## 2022-04-03 RX ADMIN — IOPAMIDOL 75 ML: 755 INJECTION, SOLUTION INTRAVENOUS at 13:53

## 2022-04-03 RX ADMIN — SODIUM CHLORIDE 500 ML: 9 INJECTION, SOLUTION INTRAVENOUS at 15:46

## 2022-04-03 ASSESSMENT — PAIN SCALES - WONG BAKER
WONGBAKER_NUMERICALRESPONSE: 6
WONGBAKER_NUMERICALRESPONSE: 2

## 2022-04-03 ASSESSMENT — PAIN DESCRIPTION - DESCRIPTORS
DESCRIPTORS: ACHING
DESCRIPTORS: ACHING

## 2022-04-03 ASSESSMENT — PAIN DESCRIPTION - FREQUENCY
FREQUENCY: INTERMITTENT
FREQUENCY: INTERMITTENT

## 2022-04-03 ASSESSMENT — PAIN SCALES - GENERAL
PAINLEVEL_OUTOF10: 5
PAINLEVEL_OUTOF10: 4
PAINLEVEL_OUTOF10: 2

## 2022-04-03 ASSESSMENT — PAIN DESCRIPTION - LOCATION
LOCATION: GENERALIZED
LOCATION: GENERALIZED

## 2022-04-03 ASSESSMENT — PAIN - FUNCTIONAL ASSESSMENT: PAIN_FUNCTIONAL_ASSESSMENT: 0-10

## 2022-04-03 ASSESSMENT — PAIN DESCRIPTION - PAIN TYPE
TYPE: ACUTE PAIN
TYPE: ACUTE PAIN

## 2022-04-03 NOTE — ED NOTES
Pt ambulated to the restroom independently. Gait steady and strong.      Hector Blackman RN  04/03/22 [id number]

## 2022-04-03 NOTE — ED PROVIDER NOTES
I was available for consultation during patient's ED stay. Patient was cared for by TOM. I did not evaluate or participate in patient care.     EKG Interpretation    Interpreted by emergency department physician    Rhythm: sinus tachycardia  Rate: 110-120  Axis: left  Ectopy: none  Conduction: normal  ST Segments: normal  T Waves: normal  Q Waves: none    Clinical Impression: sinus tachycardia    MD Brett Yan MD  04/03/22 [id number]

## 2022-04-03 NOTE — ED PROVIDER NOTES
[facility]        Pt Name: Clementine Matias  MRN: [id number]  [place] 1955  Date of evaluation: 4/3/2022  Provider: MARNIE Nava - PETRA  PCP: Felice Lee MD  Note Started: 3:19 PM EDT       TOM. I have evaluated this patient. My supervising physician was available for consultation. CHIEF COMPLAINT       Chief Complaint   Patient presents with    Fever    Fatigue     pt states he has been \"asleep since tuesday\"       HISTORY OF PRESENT ILLNESS   (Location, Timing/Onset, Context/Setting, Quality, Duration, Modifying Factors, Severity, Associated Signs and Symptoms)  Note limiting factors. Chief Complaint: fever, fatigue, back pain    Clementine Matias is a 78 y.o. male who presents the department today reporting fever, fatigue, and back pain. Patient states that he has been extremely weak and has done nothing but sleep since Tuesday. He states that fever was as high as 102. He is taking Tylenol which improves his symptoms some. He denies recent travel or known sick contacts. Patient denies having any chest pain or trouble breathing. He reports nausea but no vomiting. He denies having any diarrhea. Patient denies having any chronic back problems. Patient states that his back just feels \"sore\". He denies loss of bowel or bladder control, saddle anesthesia, or extremity numbness or tingling. Denies history of IV drug abuse or any cancers. States that he has had no appetite and has had little to eat or drink over the last few days. He states he feels as if he may be dehydrated. His deniesurinary frequency, dysuria, or hematuria. Patient states that his urine does look dark and he has not been urinating as much. Nursing Notes were all reviewed and agreed with or any disagreements were addressed in the HPI.     REVIEW OF SYSTEMS    (2-9 systems for level 4, 10 or more for level 5)     Review of Systems Constitutional: Positive for activity change, chills, fatigue, fever and unexpected weight change. HENT: Positive for dental problem. Negative for congestion and sore throat. Eyes: Negative for visual disturbance. Respiratory: Negative for cough, chest tightness, shortness of breath and wheezing. Cardiovascular: Negative for chest pain, palpitations and leg swelling. Gastrointestinal: Positive for nausea. Negative for abdominal pain, rectal pain and vomiting. Endocrine: Negative for polydipsia and polyuria. Genitourinary: Positive for decreased urine volume and flank pain. Negative for difficulty urinating and dysuria. Musculoskeletal: Positive for myalgias. Negative for arthralgias, gait problem, joint swelling, neck pain and neck stiffness. Skin: Negative for rash and wound. Neurological: Positive for weakness. Negative for dizziness and light-headedness. Hematological: Does not bruise/bleed easily. Psychiatric/Behavioral: Negative for suicidal ideas. Positives and Pertinent negatives as per HPI. Except as noted above in the ROS, all other systems were reviewed and negative.        PAST MEDICAL HISTORY     Past Medical History:   Diagnosis Date    Diverticulitis     DVT (deep venous thrombosis) ([facility] [address]     Hyperlipidemia          SURGICAL HISTORY     Past Surgical History:   Procedure Laterality Date    COLONOSCOPY N/A 2/15/2022    COLONOSCOPY POLYPECTOMY SNARE/COLD BIOPSY performed by Jerry Flaherty MD at [facility]  2/15/2022    [facility]SY [facility]       Discharge Medication List as of 4/3/2022  5:08 PM      CONTINUE these medications which have NOT CHANGED    Details   Multiple Vitamin (MULTIVITAMIN) tablet Take 1 tablet by mouth dailyHistorical Med      gemfibrozil (LOPID) 600 MG tablet Take 600 mg by mouth 2 times daily (before meals)Historical Med      apixaban (ELIQUIS STARTER PACK) 5 MG TABS tablet Take 10 mg (2 tablets) orally twice daily for 7 days, then take 5 mg (1 tablet) orally twice daily thereafter. (1st dose given in ED), Disp-72 tablet, R-0Print               ALLERGIES     Patient has no known allergies. FAMILYHISTORY     No family history on file. SOCIAL HISTORY       Social History     Tobacco Use    Smoking status: Former Smoker    Smokeless tobacco: Never Used   Vaping Use    Vaping Use: Never used   Substance Use Topics    Alcohol use: Yes     Alcohol/week: 2.0 standard drinks     Types: 2 Cans of beer per week     Comment: nightly    Drug use: Never       SCREENINGS    Randi Coma Scale  Eye Opening: Spontaneous  Best Verbal Response: Oriented  Best Motor Response: Obeys commands  [place] Coma Scale Score: 15        PHYSICAL EXAM    (up to 7 for level 4, 8 or more for level 5)     ED Triage Vitals   BP Temp Temp Source Pulse Resp SpO2 Height Weight   04/03/22 1121 04/03/22 1121 04/03/22 1121 04/03/22 1121 04/03/22 1121 04/03/22 1121 04/03/22 1303 04/03/22 1303   (!) 151/68 99.5 °F (37.5 °C) Temporal 119 18 98 % 5' 9\" (1.753 m) 165 lb 9.1 oz (75.1 kg)       Physical Exam  Vitals and nursing note reviewed. Constitutional:       General: He is not in acute distress. Appearance: Normal appearance. He is ill-appearing. HENT:      Head: Normocephalic and atraumatic. Right Ear: External ear normal.      Left Ear: External ear normal.      Nose: Nose normal.      Mouth/Throat:      Mouth: Mucous membranes are dry. Pharynx: Oropharynx is clear. Eyes:      General:         Right eye: No discharge. Left eye: No discharge. Extraocular Movements: Extraocular movements intact. Conjunctiva/sclera: Conjunctivae normal.   Cardiovascular:      Rate and Rhythm: Normal rate and regular rhythm. Pulses: Normal pulses. Heart sounds: Normal heart sounds.    Pulmonary:      Effort: Pulmonary effort is normal. No respiratory distress. Breath sounds: Normal breath sounds. Abdominal:      General: Bowel sounds are normal.      Palpations: Abdomen is soft. Tenderness: There is abdominal tenderness. Comments: Tenderness, no localized tenderness, no rebound tenderness or guarding   Musculoskeletal:         General: Normal range of motion. Cervical back: Normal range of motion and neck supple. Skin:     General: Skin is warm and dry. Capillary Refill: Capillary refill takes less than 2 seconds. Neurological:      General: No focal deficit present. Mental Status: He is alert and oriented to person, place, and time. Psychiatric:         Mood and Affect: Mood normal.         Behavior: Behavior normal.         Thought Content:  Thought content normal.         Judgment: Judgment normal.         DIAGNOSTIC RESULTS   LABS:    Labs Reviewed   CBC WITH AUTO DIFFERENTIAL - Abnormal; Notable for the following components:       Result Value    WBC 15.7 (*)     RBC 4.10 (*)     Hemoglobin 13.0 (*)     Hematocrit 37.7 (*)     Neutrophils Absolute 13.7 (*)     Lymphocytes Absolute 0.6 (*)     Monocytes Absolute 1.4 (*)     All other components within normal limits   COMPREHENSIVE METABOLIC PANEL W/ REFLEX TO MG FOR LOW K - Abnormal; Notable for the following components:    Sodium 132 (*)     Chloride 95 (*)     Glucose 135 (*)     BUN 26 (*)     CREATININE 1.4 (*)     GFR Non- 50 (*)     Total Bilirubin 1.1 (*)     All other components within normal limits   URINALYSIS WITH REFLEX TO CULTURE - Abnormal; Notable for the following components:    Clarity, UA SL CLOUDY (*)     Ketones, Urine TRACE (*)     Blood, Urine MODERATE (*)     Protein,  (*)     Nitrite, Urine POSITIVE (*)     Leukocyte Esterase, Urine SMALL (*)     All other components within normal limits   LACTATE, SEPSIS - Abnormal; Notable for the following components:    Lactic Acid, Sepsis 2.0 (*)     All other components within normal limits   MICROSCOPIC URINALYSIS - Abnormal; Notable for the following components:    Bacteria, UA 1+ (*)     WBC,  (*)     All other components within normal limits   RAPID INFLUENZA A/B ANTIGENS   CULTURE, BLOOD 1   CULTURE, BLOOD 2   CULTURE, URINE   TROPONIN   LACTATE, SEPSIS       When ordered only abnormal lab results are displayed. All other labs were within normal range or not returned as of this dictation. EKG: When ordered, EKG's are interpreted by the Emergency Department Physician in the absence of a cardiologist.  Please see their note for interpretation of EKG. RADIOLOGY:   Non-plain film images such as CT, Ultrasound and MRI are read by the radiologist. Plain radiographic images are visualized and preliminarily interpreted by the ED Provider with the below findings:        Interpretation per the Radiologist below, if available at the time of this note:    CT CHEST [address]   Final Result   1. Findings suspicious for cystitis. Recommend correlation with urinalysis. 2. Mild wall thickening of the lower esophagus. Recommend clinical   correlation for esophagitis. 3. Mild splenomegaly. XR CHEST PORTABLE   Final Result   No acute process. XR CHEST PORTABLE    Result Date: 4/3/2022  EXAMINATION: ONE XRAY VIEW OF THE CHEST 4/3/2022 12:00 pm COMPARISON: 03/25/2021 HISTORY: ORDERING SYSTEM PROVIDED HISTORY: weakness TECHNOLOGIST PROVIDED HISTORY: Reason for exam:->weakness Reason for Exam: Fever; Fatigue x 3 days FINDINGS: The lungs are without acute focal process. There is no effusion or pneumothorax. The cardiomediastinal silhouette is stable. The osseous structures are stable. No acute process.      CT CHEST ABDOMEN PELVIS W CONTRAST    Result Date: 4/3/2022  EXAMINATION: CT OF THE CHEST, ABDOMEN, AND PELVIS WITH CONTRAST 4/3/2022 1:46 pm TECHNIQUE: CT of the chest, abdomen and pelvis was performed with the administration of intravenous contrast. Multiplanar reformatted images are provided for review. Dose modulation, iterative reconstruction, and/or weight based adjustment of the mA/kV was utilized to reduce the radiation dose to as low as reasonably achievable. COMPARISON: 03/25/2021, 05/15/2019, 09/15/2018 HISTORY: ORDERING SYSTEM PROVIDED HISTORY: fever/weakness/tachycardia TECHNOLOGIST PROVIDED HISTORY: Reason for exam:->fever/weakness/tachycardia Additional Contrast?->None Decision Support Exception - unselect if not a suspected or confirmed emergency medical condition->Emergency Medical Condition (MA) Reason for Exam: Fever; Fatigue FINDINGS: Chest: Mediastinum: Moderate coronary artery calcification. Mild aortic valvular calcification. Heart size is normal.  No pericardial effusion. No lymphadenopathy. Mild wall thickening of the lower esophagus. Lungs/pleura: The lungs are clear. No pleural effusion or pneumothorax. Soft Tissues/Bones: Multilevel degenerative disc disease. Abdomen/Pelvis: Organs: Liver is normal in contour and enhancement. Adenomyomatosis of the gallbladder fundus. Gallbladder is otherwise unremarkable without biliary ductal dilatation. Pancreas is unremarkable. Adrenals are unremarkable. MIld splenomegaly. No renal calculi or hydronephrosis. Mild calcific atherosclerosis. GI/Bowel: Left-sided diverticulosis without bowel wall thickening or dilatation. Appendix is normal. Pelvis: Diffuse bladder wall thickening with surrounding fat stranding. Peritoneum/Retroperitoneum:No free fluid, free air, organized fluid collection or lymphadenopathy. Bones: Multilevel degenerative disc disease. 1. Findings suspicious for cystitis. Recommend correlation with urinalysis. 2. Mild wall thickening of the lower esophagus. Recommend clinical correlation for esophagitis. 3. Mild splenomegaly.            PROCEDURES   Unless otherwise noted below, none     Procedures    CRITICAL CARE TIME none    CONSULTS:  None      EMERGENCY DEPARTMENT COURSE and DIFFERENTIAL DIAGNOSIS/MDM:   Vitals:    Vitals:    04/03/22 1533 04/03/22 1548 04/03/22 1551 04/03/22 1703   BP:   124/61 123/64   Pulse: 102  97 96   Resp: 17   16   Temp:  99 °F (37.2 °C)     TempSrc:  Oral     SpO2: 96%  94% 95%   Weight:       Height:           Patient was given the following medications:  Medications   0.9 % sodium chloride bolus (0 mLs IntraVENous Stopped 4/3/22 1410)   acetaminophen (TYLENOL) tablet 1,000 mg (1,000 mg Oral Given 4/3/22 1248)   0.9 % sodium chloride bolus (0 mLs IntraVENous Stopped 4/3/22 1531)   0.9 % sodium chloride bolus (0 mLs IntraVENous Stopped 4/3/22 1628)   iopamidol (ISOVUE-370) 76 % injection 75 mL (75 mLs IntraVENous Given 4/3/22 1353)   ciprofloxacin (CIPRO) tablet 500 mg (500 mg Oral Given 4/3/22 1701)           Previous records reviewed in order to gain further information regarding patient's PMH as well as his HPI. Nursing notes reviewed. This is a 58-year-old  male who presents to the emergency department today reporting approximate 3 to 4-day history of generalized weakness and fever. Patient also reports bilateral flank pain. He reports nausea but no vomiting. Physical exam complete. Patient is nontoxic, afebrile, normotensive. Patient is pale. He appears weak. Due to patient's reported fever of 102 septic work-up is initiated. Patient medicated with IV normal saline 30 mL/kg bolus. He is given Tylenol. Urinalysis consistent with UTI. His white count is 15. Remaining laboratory studies have been unremarkable or consistent with previous. CT chest, abdomen, and pelvis interpreted by radiologist shows acute cystitis without evidence of other acute intra-abdominal pathology. Patient reassessed. Patient has remained hemodynamically stable throughout ED visit. He does report significant relief of symptoms after intervention.   At this time there is no evidence of any life-threatening or emergent conditions requiring immediate intervention. There is no evidence of sepsis or pyelonephritis. Patient will be discharged with emphasis on close outpatient follow-up. Prescription for Cipro provided. Patient agrees to take this medication just as directed. He agrees to increase his fluid intake. He agrees to take OTC Tylenol or ibuprofen for pain, fever, or body aches. Urology referral provided. He agrees to follow-up as directed. He agrees to return for high fever, signs of vomiting, severe pain, inability to void, or any other worsening symptoms. Patient is discharged home with family in stable condition. FINAL IMPRESSION      1. Urinary tract infection with hematuria, site unspecified    2. Generalized weakness    3.  Dehydration          DISPOSITION/PLAN   DISPOSITION Decision To Discharge 04/03/2022 04:29:24 PM      PATIENT REFERRED TO:  Mey Tilley MD  [place] [id number]  [address] [facility] [address]  [phone]    In 1 day      [address] [phone]    In 1 day  urologist      DISCHARGE MEDICATIONS:  Discharge Medication List as of 4/3/2022  5:08 PM          DISCONTINUED MEDICATIONS:  Discharge Medication List as of 4/3/2022  5:08 PM                 (Please note that portions of this note were completed with a voice recognition program.  Efforts were made to edit the dictations but occasionally words are mis-transcribed.)    MARNIE Christie CNP (electronically signed)            MARNIE Christie CNP  04/04/22 5716

## 2022-04-04 ENCOUNTER — TELEPHONE (OUTPATIENT)
Dept: PRIMARY CARE CLINIC | Age: 67
End: 2022-04-04

## 2022-04-04 LAB
EKG ATRIAL RATE: 119 BPM
EKG DIAGNOSIS: NORMAL
EKG P AXIS: 35 DEGREES
EKG P-R INTERVAL: 142 MS
EKG Q-T INTERVAL: 308 MS
EKG QRS DURATION: 88 MS
EKG QTC CALCULATION (BAZETT): 433 MS
EKG R AXIS: -29 DEGREES
EKG T AXIS: 33 DEGREES
EKG VENTRICULAR RATE: 119 BPM

## 2022-04-04 PROCEDURE — 93010 ELECTROCARDIOGRAM REPORT: CPT | Performed by: INTERNAL MEDICINE

## 2022-04-04 ASSESSMENT — ENCOUNTER SYMPTOMS
SORE THROAT: 0
CHEST TIGHTNESS: 0
ABDOMINAL PAIN: 0
NAUSEA: 1
COUGH: 0
SHORTNESS OF BREATH: 0
RECTAL PAIN: 0
WHEEZING: 0
VOMITING: 0

## 2022-04-04 NOTE — TELEPHONE ENCOUNTER
Pt was in the ER yesterday for UTI (diganosed) ,sent him home with antibiotics, pt wife stated he's having pain in back/side 7/10 pain level. ..ER didnt give pt anything for pain. currently taking 2 capsules every 6 hrs of acetaminophen Was wondering if he can take acetaminophen more often to help with the pain or is there any pain medicine that can be prescribe.

## 2022-04-05 ENCOUNTER — TELEPHONE (OUTPATIENT)
Dept: PRIMARY CARE CLINIC | Age: 67
End: 2022-04-05

## 2022-04-06 DIAGNOSIS — R30.0 DYSURIA: Primary | ICD-10-CM

## 2022-04-06 RX ORDER — TRAMADOL HYDROCHLORIDE 50 MG/1
50 TABLET ORAL EVERY 6 HOURS PRN
Qty: 28 TABLET | Refills: 0 | Status: SHIPPED | OUTPATIENT
Start: 2022-04-06 | End: 2022-04-13

## 2022-04-06 NOTE — TELEPHONE ENCOUNTER
Spoke with wife. Patient went to work today, evidently having less pain. Still having low-grade temps. Has follow-up with urology. Needs work-up for etiology of severe cystitis, spec urinary retention but cannot exclude malignancy. Clearly pyuria, patient 100,000 gram-negative rods, but 3 days out urine culture ID and sensitivities still pending. Chronically anticoagulated therefore not a NSAID candidate.     Taking 4000 mg Tylenol daily, provided 50 mg Ultram prescription 4 times a day as needed, avoid alcohol and watch for urinary retention while taking narcotics

## 2022-04-06 NOTE — TELEPHONE ENCOUNTER
Call from daughter & PT  Did spike today, but overall feeling better since ER visit4/3/2022  for uti culture pos for gram neg org. Sensitivities pending. Neg bc so far. Taking po fluids ok. Ok to take acetaminophen qid prn. Cont. Cipro. Call office 24 -36 hrs if no better. PT & daughter in agreement with this plan.

## 2022-04-07 LAB
BLOOD CULTURE, ROUTINE: NORMAL
CULTURE, BLOOD 2: NORMAL
ORGANISM: ABNORMAL
URINE CULTURE, ROUTINE: ABNORMAL
URINE CULTURE, ROUTINE: ABNORMAL

## 2022-04-19 NOTE — PROGRESS NOTES
2022    Sheila Brewer (:  1955) is a 78 y.o. male, here for evaluation of the following medical concerns:    Chief Complaint   Patient presents with    3 Month Follow-Up       HPI  43-year-old male with history of seemingly unprovoked pulmonary embolism , distal LLE DVT 3/2021 (off a/c at presentation) now on lifelong anticoagulation, uncomplicated sigmoid diverticulitis , hypertriglyceridemia, history of GERD with possible esophagitis on CT no longer on acid suppressive therapy, who established care last month following FDC of his PCP. 25-pack-year smoker quit , but reportedly consumes 15 ounces of alcohol a week. He describes having a couple of beers 5 nights a week but reportedly cut back on this substantially in recent months. Kidney function has been declining somewhat. CMP 1.0 , 1.2021 (1.2021) 1.2021. CT chest and CT abdomen in  and  comment on perinephric stranding bilaterally without hydronephrosis. No perinephric stranding noted on  CT obtained for UTI symptoms. Hemoglobin has been at the lower limit of normal 13.2 February 13.. Normal platelet WBC normal differential.  His hematologist organized a anemia work-up, elevated ferritin normal iron saturation B12 folate, neg UPEP/ SPEP. He was referred to nephrology for his kidney disease slowly rising creatinine perinephric stranding on CT 8301-6154. Nephrology noted positive DANAY and refer is referring to rheumatology. Patient recently seen for UTI symptoms with leukocytosis pericystic stranding but no diverticulitis or intrapelvic abscess on CT, a couple months out from an otherwise uneventful colonoscopy. No recent urinary tract instrumentation, nocturia x1 historically with low PSA and normal sed rate in January prior to colonoscopy. Fortunately ED kindly referred him to urology for this surprising UTI.   He is feeling much better, back to urinating only once overnight. Review of Systems   Constitutional: Negative for activity change, appetite change, fatigue and unexpected weight change. HENT: Negative for dental problem, sinus pain, sore throat and trouble swallowing. Eyes: Negative for pain and visual disturbance. Respiratory: Negative for apnea, cough, chest tightness, shortness of breath and wheezing. Cardiovascular: Negative for chest pain and palpitations. Gastrointestinal: Negative for abdominal pain, blood in stool, constipation, diarrhea, nausea, rectal pain and vomiting. Endocrine: Negative for cold intolerance, heat intolerance, polydipsia, polyphagia and polyuria. Genitourinary: Negative for difficulty urinating, dysuria, flank pain, frequency, hematuria, pelvic pain, urgency, . Musculoskeletal: Negative for arthralgias, back pain, gait problem, joint swelling, myalgias, neck pain and neck stiffness. Skin: Negative for color change and rash. Neurological: Negative for dizziness, tremors, syncope, speech difficulty, weakness, light-headedness and headaches. Hematological: Negative for adenopathy. Does not bruise/bleed easily. Psychiatric/Behavioral: Negative for agitation, behavioral problems, decreased concentration, sleep disturbance and suicidal ideas. The patient is not nervous/anxious and is not hyperactive. Prior to Visit Medications    Medication Sig Taking?  Authorizing Provider   atorvastatin (LIPITOR) 80 MG tablet Take 1 tablet by mouth daily Yes Shmuel Silva MD   Multiple Vitamin (MULTIVITAMIN) tablet Take 1 tablet by mouth daily Yes Historical Provider, MD   gemfibrozil (LOPID) 600 MG tablet Take 600 mg by mouth 2 times daily (before meals) Yes Historical Provider, MD   apixaban (ELIQUIS STARTER PACK) 5 MG TABS tablet Take 10 mg (2 tablets) orally twice daily for 7 days, then take 5 mg (1 tablet) orally twice daily thereafter. (1st dose given in ED) Yes LATRELL Braga        No Known Allergies    Past Medical History:   Diagnosis Date    Diverticulitis     DVT (deep venous thrombosis) (HCC)     Hyperlipidemia        Past Surgical History:   Procedure Laterality Date    COLONOSCOPY N/A 2/15/2022    COLONOSCOPY POLYPECTOMY SNARE/COLD BIOPSY performed by Ezio Santana MD at [facility] COLONOSCOPY  2/15/2022    COLONOSCOPY WITH BIOPSY performed by Ezio Santana MD at [facility] History     Socioeconomic History    Marital status:      Spouse name: Not on file    Number of children: Not on file    Years of education: Not on file    Highest education level: Not on file   Occupational History    Not on file   Tobacco Use    Smoking status: Former Smoker    Smokeless tobacco: Never Used   Vaping Use    Vaping Use: Never used   Substance and Sexual Activity    Alcohol use:  Yes     Alcohol/week: 2.0 standard drinks     Types: 2 Cans of beer per week     Comment: nightly    Drug use: Never    Sexual activity: Not on file   Other Topics Concern    Not on file   Social History Narrative    Not on file     Social Determinants of Health     Financial Resource Strain: Unknown    Difficulty of Paying Living Expenses: Patient refused   Food Insecurity: Unknown    Worried About Running Out of Food in the Last Year: Patient refused    [address] in the Last Year: Patient refused   Transportation Needs: Unknown    Lack of Transportation (Medical): Patient refused    Lack of Transportation (Non-Medical): Patient refused   Physical Activity: Unknown    Days of Exercise per Week: Patient refused    Minutes of Exercise per Session: Patient refused   Stress: Unknown    Feeling of Stress : Patient refused   Social Connections: Unknown    Frequency of Communication with Friends and Family: Patient refused    Frequency of Social Gatherings with Friends and Family: Patient refused    Attends Yarsanism Services: Patient refused    Active Member of Clubs or Organizations: Not on file    Attends Club or Organization Meetings: Not on file    Marital Status: Patient refused   Intimate Partner Violence: Unknown    Fear of Current or Ex-Partner: Patient refused    Emotionally Abused: Patient refused    Physically Abused: Patient refused    Sexually Abused: Patient refused   Housing Stability: Unknown    Unable to Pay for Housing in the Last Year: Patient refused    Number of Places Lived in the Last Year: Not on file    Unstable Housing in the Last Year: Patient refused    lives at home with his wife. 2 grown children son and daughter. 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 Vitals:    04/20/22 0852   BP: 132/71   Pulse: 71   Temp: 97.2 °F (36.2 °C)   TempSrc: Temporal   SpO2: 99%   Weight: 152 lb (68.9 kg)     Estimated body mass index is 22.45 kg/m² as calculated from the following:    Height as of 4/3/22: 5' 9\" (1.753 m). Weight as of this encounter: 152 lb (68.9 kg). PHYSICAL EXAM  GENERAL:  Pleasant quiet  male who looks his stated age, awake alert and oriented x3, no acute distress. ABDOMEN:  Soft, nontender. Normal bowel sounds. No guarding. No masses. No CVAT. UROGENITAL:  Deferred    PSYCH: Mild psychomotor retardation. Good eye contact. Slightly restricted affect range. Mood congruent with affect. Linear thought. LABS  Lab Review     ASSESSMENT/PLAN  1. Stage 3 chronic kidney disease, unspecified whether stage 3a or 3b CKD (HCC)  Creatinine has fluctuated between 1.0 and 1.4 since 2018. Outside labs suggest gradually rising pattern. No chronic NSAID use at this time Gets up once at night to urinate. I am somewhat concerned about the abnormal appearance of his kidneys on CT abdomen in 2019, previously explained to patient that blood urine testing kidney imaging with attention paid to renal artery should be considered.   Referred to nephrology, Dr. Tati Mancera, who obtained positive DANAY, borderline proteinuria, and has referred to Dr. Annika Mensah in rheumatology. 2. Recurrent acute deep vein thrombosis (DVT) of lower extremity, unspecified laterality [facility])  Patient has a left lower extremity DVT February 8, 2021, and an unprovoked PE without identified DVT in September 2018. ATIII, protein C&S prothrombin gene mutation factor V Leiden anticardiolipin antibody panel negative therefore patient has an unidentified thrombophilia in view of 2 unprovoked events and this physically active male. Both his parents were on Coumadin for unclear reasons, and he has children. Eldest daughter has not yet been pregnant. 3. Hypertriglyceridemia. Mixed hyperlipidemia. Fatty liver disease but no history of pancreatitis though chronic alcohol consumption enhances risk. 2000 triglycerides 2840, and 2004 4697. Since then triglycerides have ranged between 500-900, 600 now, despite alcohol abstinence. With LDL in the 130s, 10-year risk heart attack or stroke 14.6% for which we will start atorvastatin 80 mg, which should also help some with his triglycerides, and in 10 weeks Update LFTs lipid panel. Anticipate addition Vascepa versus Lovaza. Evidently took some form of fish oil without substantial benefit in the past.    On dual lipid-lowering therapy should have LFTs every 6 months. 4. Routine adult health maintenance  Eligible for Tdap Shingrix [id number] Influenza shots, declines all of them. Completed Covid booster #1 Pfizer. No AAA on CT. HIV HCV screen - January 2022. TSH PSA A1c all within normal range. Mild vitamin D deficiency, defer to nephrology. 5. Hx of adenomatous colonic polyps  History of large 1 cm polyp 2014, appears to have had subsequent colonoscopy after uncomplicated diverticulitis in [id number], and serrated adenoma removed February 2022. 6. elevated ferritin, resolved. Normal iron saturation. No polycythemia.   On recheck ferritin and CRP were normal January 2022.    7.  Severe Klebsiella UTI. No recent urinary tract instrumentation. Couple months out from colonoscopy patient experienced UTI despite low PSA, with leukocytosis, growing out ampicillin resistant Klebsiella treated with a week of Cipro with improvement. No suprapubic tenderness or cuff to angle tenderness at this time. He is back to getting up once at night to urinate. He had a CT which showed no obvious fistula or diverticulitis, so we wonder about false negative CT versus euros stasis for which he is scheduled to see urology today. Return in about 10 weeks (around 6/29/2022). It was a pleasure to visit with Mr. Pardo All today. Answered all questions as best I could. Jw Sanz MD   Time 25 minutes.

## 2022-04-20 ENCOUNTER — OFFICE VISIT (OUTPATIENT)
Dept: PRIMARY CARE CLINIC | Age: 67
End: 2022-04-20
Payer: MEDICARE

## 2022-04-20 VITALS
SYSTOLIC BLOOD PRESSURE: 132 MMHG | BODY MASS INDEX: 22.45 KG/M2 | OXYGEN SATURATION: 99 % | DIASTOLIC BLOOD PRESSURE: 71 MMHG | HEART RATE: 71 BPM | TEMPERATURE: 97.2 F | WEIGHT: 152 LBS

## 2022-04-20 DIAGNOSIS — N30.00 ACUTE CYSTITIS WITHOUT HEMATURIA: ICD-10-CM

## 2022-04-20 DIAGNOSIS — E78.2 MIXED HYPERLIPIDEMIA: Primary | ICD-10-CM

## 2022-04-20 PROCEDURE — 99213 OFFICE O/P EST LOW 20 MIN: CPT | Performed by: INTERNAL MEDICINE

## 2022-04-20 RX ORDER — ATORVASTATIN CALCIUM 80 MG/1
80 TABLET, FILM COATED ORAL DAILY
Qty: 90 TABLET | Refills: 1 | Status: SHIPPED | OUTPATIENT
Start: 2022-04-20 | End: 2022-06-22

## 2022-04-20 NOTE — PATIENT INSTRUCTIONS
1. Agree w rheum consultation to w/u kidney disease (DANAY positive)  2. Why did you get a bad UTI with leukocytosis? 2 months out from colonoscopy? (nl CT) (nl CRP and PSA 1.0 1/2022)   3. Recommend aggressive lipid lowering therapy in addition to Gemfibrozil, starting with atorvastatin 80mg, recheck blood work in 3 months. Anticipate vascepa vs lovaza to further lower TGs in future.

## 2022-04-21 ENCOUNTER — TELEPHONE (OUTPATIENT)
Dept: PRIMARY CARE CLINIC | Age: 67
End: 2022-04-21

## 2022-05-02 ENCOUNTER — NURSE TRIAGE (OUTPATIENT)
Dept: OTHER | Facility: CLINIC | Age: 67
End: 2022-05-02

## 2022-05-02 NOTE — TELEPHONE ENCOUNTER
Received call from Yang Moore at [facility] with Red Flag Complaint. Subjective: Caller states \"Fatigue\"     Current Symptoms: Fatigue since November. However, states since having the UTI on April 1, states the fatigue has gotten worse. Also, noted to have  Discomfort on stomach and chest since starting cholesterol. Onset: 1 month ago; unchanged    Associated Symptoms: NA    Pain Severity: 5/10; pressure; intermittent    Temperature: denies fever    What has been tried: none    LMP: NA Pregnant: NA    Recommended disposition: See PCP within 3 Days    Care advice provided, patient verbalizes understanding; denies any other questions or concerns; instructed to call back for any new or worsening symptoms. Patient/Caller agrees with recommended disposition; writer provided warm transfer to [employer] at [facility] for appointment scheduling     Attention Provider: Thank you for allowing me to participate in the care of your patient. The patient was connected to triage in response to information provided to the ECC/PSC. Please do not respond through this encounter as the response is not directed to a shared pool.       Reason for Disposition   Fatigue (i.e., tires easily, decreased energy) and persists > 1 week    Protocols used: WEAKNESS (GENERALIZED) AND FATIGUE-ADULT-[place]

## 2022-05-06 ENCOUNTER — HOSPITAL ENCOUNTER (OUTPATIENT)
Age: 67
Discharge: HOME | End: 2022-05-06
Payer: MEDICARE

## 2022-05-06 ENCOUNTER — OFFICE VISIT (OUTPATIENT)
Dept: PRIMARY CARE CLINIC | Age: 67
End: 2022-05-06
Payer: MEDICARE

## 2022-05-06 ENCOUNTER — HOSPITAL ENCOUNTER (OUTPATIENT)
Dept: GENERAL RADIOLOGY | Age: 67
Discharge: HOME | End: 2022-05-06
Payer: MEDICARE

## 2022-05-06 VITALS
HEART RATE: 76 BPM | WEIGHT: 157 LBS | SYSTOLIC BLOOD PRESSURE: 136 MMHG | TEMPERATURE: 97.2 F | BODY MASS INDEX: 23.18 KG/M2 | OXYGEN SATURATION: 98 % | DIASTOLIC BLOOD PRESSURE: 67 MMHG

## 2022-05-06 DIAGNOSIS — R06.09 DYSPNEA ON EXERTION: ICD-10-CM

## 2022-05-06 DIAGNOSIS — R53.83 FATIGUE, UNSPECIFIED TYPE: ICD-10-CM

## 2022-05-06 DIAGNOSIS — R05.9 COUGH: ICD-10-CM

## 2022-05-06 DIAGNOSIS — R05.9 COUGH: Primary | ICD-10-CM

## 2022-05-06 DIAGNOSIS — E78.2 MIXED HYPERLIPIDEMIA: ICD-10-CM

## 2022-05-06 LAB
ALT SERPL-CCNC: 18 U/L (ref 10–40)
BACTERIA: ABNORMAL /HPF
BASOPHILS ABSOLUTE: 0.1 K/UL (ref 0–0.2)
BASOPHILS RELATIVE PERCENT: 1 %
BILIRUBIN URINE: NEGATIVE
BLOOD, URINE: NEGATIVE
C-REACTIVE PROTEIN: 10.3 MG/L (ref 0–5.1)
CHOLESTEROL, TOTAL: 226 MG/DL (ref 0–199)
CLARITY: CLEAR
COLOR: ABNORMAL
EOSINOPHILS ABSOLUTE: 0.4 K/UL (ref 0–0.6)
EOSINOPHILS RELATIVE PERCENT: 4.7 %
EPITHELIAL CELLS, UA: 0 /HPF (ref 0–5)
GLUCOSE URINE: NEGATIVE MG/DL
HCT VFR BLD CALC: 39.1 % (ref 40.5–52.5)
HDLC SERPL-MCNC: 31 MG/DL (ref 40–60)
HEMOGLOBIN: 13.1 G/DL (ref 13.5–17.5)
HYALINE CASTS: 0 /LPF (ref 0–8)
KETONES, URINE: NEGATIVE MG/DL
LACTATE DEHYDROGENASE: 227 U/L (ref 100–190)
LDL CHOLESTEROL CALCULATED: ABNORMAL MG/DL
LDL CHOLESTEROL DIRECT: 147 MG/DL
LEUKOCYTE ESTERASE, URINE: ABNORMAL
LYMPHOCYTES ABSOLUTE: 2 K/UL (ref 1–5.1)
LYMPHOCYTES RELATIVE PERCENT: 26.3 %
MCH RBC QN AUTO: 30.7 PG (ref 26–34)
MCHC RBC AUTO-ENTMCNC: 33.5 G/DL (ref 31–36)
MCV RBC AUTO: 91.5 FL (ref 80–100)
MICROSCOPIC EXAMINATION: YES
MONOCYTES ABSOLUTE: 0.8 K/UL (ref 0–1.3)
MONOCYTES RELATIVE PERCENT: 10.1 %
NEUTROPHILS ABSOLUTE: 4.3 K/UL (ref 1.7–7.7)
NEUTROPHILS RELATIVE PERCENT: 57.9 %
NITRITE, URINE: NEGATIVE
PDW BLD-RTO: 13.7 % (ref 12.4–15.4)
PH UA: 6 (ref 5–8)
PLATELET # BLD: 288 K/UL (ref 135–450)
PMV BLD AUTO: 8 FL (ref 5–10.5)
PRO-BNP: 74 PG/ML (ref 0–124)
PROTEIN UA: NEGATIVE MG/DL
RBC # BLD: 4.27 M/UL (ref 4.2–5.9)
RBC UA: 1 /HPF (ref 0–4)
SPECIFIC GRAVITY UA: 1.02 (ref 1–1.03)
TOTAL CK: 70 U/L (ref 39–308)
TRIGL SERPL-MCNC: 303 MG/DL (ref 0–150)
TROPONIN: <0.01 NG/ML
TSH REFLEX: 1.34 UIU/ML (ref 0.27–4.2)
URINE TYPE: ABNORMAL
UROBILINOGEN, URINE: 0.2 E.U./DL
VLDLC SERPL CALC-MCNC: ABNORMAL MG/DL
WBC # BLD: 7.5 K/UL (ref 4–11)
WBC UA: 0 /HPF (ref 0–5)

## 2022-05-06 PROCEDURE — 93000 ELECTROCARDIOGRAM COMPLETE: CPT | Performed by: INTERNAL MEDICINE

## 2022-05-06 PROCEDURE — 71046 X-RAY EXAM CHEST 2 VIEWS: CPT

## 2022-05-06 PROCEDURE — 99213 OFFICE O/P EST LOW 20 MIN: CPT | Performed by: INTERNAL MEDICINE

## 2022-05-06 NOTE — PATIENT INSTRUCTIONS
1. EKG, CXR, Blood test today  2. HSAT via sleep medicine  3. Celluvisc to help sooth the eyes  4.   Revisit statin at follow up

## 2022-05-06 NOTE — PROGRESS NOTES
2022    Brett De Jesus (:  1955) is a 78 y.o. male, here for evaluation of the following medical concerns:    No chief complaint on file. HPI  25-year-old male with history of seemingly unprovoked pulmonary embolism , distal LLE DVT 3/2021 (off a/c at presentation) now on lifelong anticoagulation, uncomplicated sigmoid diverticulitis , hypertriglyceridemia, history of GERD with possible esophagitis on CT no longer on acid suppressive therapy, who attends for fatigue. 25-pack-year smoker quit , but reportedly used to consume 15 ounces of alcohol a week. He describes having a couple of beers 5 nights a week but reportedly cut back on this substantially in recent months. Kidney function has been declining somewhat. CMP 1.0 , 1.2021 (1.2021) 1.2021. CT chest and CT abdomen in 2018 and  comment on perinephric stranding bilaterally without hydronephrosis. No perinephric stranding noted on  CT obtained for UTI symptoms. Hemoglobin has been at the lower limit of normal 13.2 February 13.. Normal platelet WBC normal differential.  His hematologist organized a anemia work-up, elevated ferritin normal iron saturation B12 folate, neg UPEP/ SPEP. He was referred to nephrology for his kidney disease slowly rising creatinine perinephric stranding on CT 3366-1389. Nephrology noted positive DANAY and referred to rheumatology, appointment upcoming. Patient recently seen for UTI symptoms with leukocytosis pericystic stranding but no diverticulitis or intrapelvic abscess on CT, a couple months out from an otherwise uneventful colonoscopy. No recent urinary tract instrumentation, nocturia x1 historically with low PSA and normal sed rate in January prior to colonoscopy. Fortunately ED kindly referred him to urology for this surprising UTI. He is feeling much better, back to urinating only once overnight.   He tells me urology seemly did not do a postvoid residual, started him on Flomax. He attends today for progressive fatigue. He states this started about 18 months ago when he had COVID, and then markedly worsened after febrile urinary tract infection April 3 with radiographic evidence of cystitis, as well as suggestion of distal esophagus mild wall thickening and mild splenomegaly. He had some upset stomach on statin that we recently started, which she stopped with improvement. Currently takes gemfibrozil and apixaban and a multivitamin and Flomax. He says he feels tired and sleepy all the time, with occasional night sweats, mild chest congestion, and a feeling like he has been up a couple days a row in his eyes not burning per se but just tired. Denies history of anxiety or depression. Recent CT notable for gastroesophageal junction thickening mild splenomegaly bladder wall thickening without lymphadenopathy. He has not had EGD or cystoscopy, underwent colonoscopy a couple of months ago with a couple serrated polyps removed. Review of Systems   Constitutional: Negative for activity change, appetite change, fatigue and unexpected weight change. HENT: Negative for dental problem, sinus pain, sore throat and trouble swallowing. Eyes: Negative for pain and visual disturbance. Respiratory: Negative for apnea, cough, chest tightness, shortness of breath and wheezing. Cardiovascular: Negative for chest pain and palpitations. Gastrointestinal: Negative for abdominal pain, blood in stool, constipation, diarrhea, nausea, rectal pain and vomiting. Endocrine: Negative for cold intolerance, heat intolerance, polydipsia, polyphagia and polyuria. Genitourinary: Negative for difficulty urinating, dysuria, flank pain, frequency, hematuria, pelvic pain, urgency, . Musculoskeletal: Negative for arthralgias, back pain, gait problem, joint swelling, myalgias, neck pain and neck stiffness.    Skin: Negative for color change and rash. Neurological: Negative for dizziness, tremors, syncope, speech difficulty, weakness, light-headedness and headaches. Hematological: Negative for adenopathy. Does not bruise/bleed easily. Psychiatric/Behavioral: Negative for agitation, behavioral problems, decreased concentration, sleep disturbance and suicidal ideas. The patient is not nervous/anxious and is not hyperactive. Prior to Visit Medications    Medication Sig Taking? Authorizing Provider   atorvastatin (LIPITOR) 80 MG tablet Take 1 tablet by mouth daily  Felice Lee MD   Multiple Vitamin (MULTIVITAMIN) tablet Take 1 tablet by mouth daily  Historical Provider, MD   gemfibrozil (LOPID) 600 MG tablet Take 600 mg by mouth 2 times daily (before meals)  Historical Provider, MD   apixaban (ELIQUIS STARTER PACK) 5 MG TABS tablet Take 10 mg (2 tablets) orally twice daily for 7 days, then take 5 mg (1 tablet) orally twice daily thereafter. (1st dose given in ED)  LATRELL Leal        No Known Allergies    Past Medical History:   Diagnosis Date    Diverticulitis     DVT (deep venous thrombosis) ([facility] [address]     Hyperlipidemia        Past Surgical History:   Procedure Laterality Date    COLONOSCOPY N/A 2/15/2022    COLONOSCOPY POLYPECTOMY SNARE/COLD BIOPSY performed by Sylvester Vigil MD at [address] [facility]  2/15/2022    COLONOSCOPY WITH BIOPSY performed by Sylvester Vigil MD at [address] History     Socioeconomic History    Marital status:      Spouse name: Not on file    Number of children: Not on file    Years of education: Not on file    Highest education level: Not on file   Occupational History    Not on file   Tobacco Use    Smoking status: Former Smoker    Smokeless tobacco: Never Used   Vaping Use    Vaping Use: Never used   Substance and Sexual Activity    Alcohol use:  Yes     Alcohol/week: 2.0 standard drinks     Types: 2 Cans of beer per week     Comment: nightly    Drug use: Never    Sexual activity: Not on file   Other Topics Concern    Not on file   Social History Narrative    Not on file     Social Determinants of Health     Financial Resource Strain: Unknown    Difficulty of Paying Living Expenses: Patient refused   Food Insecurity: Unknown    Worried About Running Out of Food in the Last Year: Patient refused   [address] in the Last Year: Patient refused   Transportation Needs: Unknown    Lack of Transportation (Medical): Patient refused    Lack of Transportation (Non-Medical): Patient refused   Physical Activity: Unknown    Days of Exercise per Week: Patient refused    Minutes of Exercise per Session: Patient refused   Stress: Unknown    Feeling of Stress : Patient refused   Social Connections: Unknown    Frequency of Communication with Friends and Family: Patient refused    Frequency of Social Gatherings with Friends and Family: Patient refused    Attends Cheondoism Services: Patient refused    Active Member of Clubs or Organizations: Not on file    Attends Club or Organization Meetings: Not on file    Marital Status: Patient refused   Intimate Partner Violence: Unknown    Fear of Current or Ex-Partner: Patient refused    Emotionally Abused: Patient refused    Physically Abused: Patient refused    Sexually Abused: Patient refused   Housing Stability: Unknown    Unable to Pay for Housing in the Last Year: Patient refused    Number of Places Lived in the Last Year: Not on file    Unstable Housing in the Last Year: Patient refused    lives at home with his wife. 2 grown children son and daughter. 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 There were no vitals filed for this visit. Estimated body mass index is 22.45 kg/m² as calculated from the following:    Height as of 4/3/22: 5' 9\" (1.753 m). Weight as of 4/20/22: 152 lb (68.9 kg).     PHYSICAL EXAM  GENERAL:  Pleasant quiet  male who looks his stated age, awake alert and oriented x3, no acute distress. LYMPH: No supraclavicular cervical axillary or inguinal lymphadenopathy  ABDOMEN:  Soft, nontender. Normal bowel sounds. No guarding. No masses. No CVAT. No appreciable splenomegaly. LUNGS: Clear to auscultation bilaterally. Good air entry. No inspiratory crackles or expiratory wheeze. HEART: Regular rate and rhythm without pathologic murmur gallop S3 is 4  UROGENITAL:  Deferred to urology  PSYCH: Mild psychomotor retardation. Good eye contact. Slightly restricted affect range. Mood congruent with affect. Linear thought. LABS  Lab Review     ASSESSMENT/PLAN  1. Stage 3 chronic kidney disease, unspecified whether stage 3a or 3b CKD (HCC)  Creatinine has fluctuated between 1.0 and 1.4 since 2018. Outside labs suggest gradually rising pattern. No chronic NSAID use at this time Gets up once at night to urinate. I am somewhat concerned about the abnormal appearance of his kidneys on CT abdomen in 2019, previously explained to patient that blood urine testing kidney imaging with attention paid to renal artery should be considered. Referred to nephrology, Dr. Iris West, who obtained ultrasound without arterial Dopplers, positive DANAY, borderline proteinuria, and has referred to Dr. Renny Talley in rheumatology. 2. Recurrent acute deep vein thrombosis (DVT) of lower extremity, unspecified laterality [facility])  Patient has a left lower extremity DVT February 8, 2021, and an unprovoked PE without identified DVT in September 2018. ATIII, protein C&S prothrombin gene mutation factor V Leiden anticardiolipin antibody panel negative therefore patient has an unidentified thrombophilia in view of 2 unprovoked events and this physically active male. Both his parents were on Coumadin for unclear reasons, and he has children. Eldest daughter has not yet been pregnant. 3. Hypertriglyceridemia. Mixed hyperlipidemia.   Fatty liver disease but no history of pancreatitis though chronic alcohol consumption enhances risk. 2000 triglycerides 2840, and 2004 4697. Since then triglycerides have ranged between 500-900, 600 now, despite alcohol abstinence. With LDL in the 130s, 10-year risk heart attack or stroke 14.6% for which we tried start atorvastatin 80 mg, which should also help some with his triglycerides, and in 10 weeks unfortunately poorly tolerated. Anticipate addition Vascepa versus Lovaza. Evidently took some form of fish oil without substantial benefit in the past.    Will revisit lower dose rosuvastatin. On dual lipid-lowering therapy should have LFTs every 6 months. 4. Routine adult health maintenance  Eligible for Tdap Shingrix [id number] Influenza shots, declines all of them. Completed Covid booster #1 Pfizer. No AAA on CT. HIV HCV screen - January 2022. TSH PSA A1c all within normal range. Mild vitamin D deficiency, defer to nephrology. 5. Hx of adenomatous colonic polyps  History of large 1 cm polyp 2014, appears to have had subsequent colonoscopy after uncomplicated diverticulitis in 3420-9253, and serrated adenoma removed February 2022. 6. elevated ferritin, resolved. Normal iron saturation. No polycythemia, actually borderline anemic. On recheck ferritin and CRP were normal January 2022.    7.  Severe Klebsiella UTI. No recent urinary tract instrumentation. Couple months out from colonoscopy patient experienced UTI despite low PSA, with leukocytosis, growing out ampicillin resistant Klebsiella treated with a week of Cipro with improvement. No suprapubic tenderness or cuff to angle tenderness at this time. He is back to getting up once at night to urinate. He had a CT which showed no obvious fistula or diverticulitis, so we wonder about false negative CT versus urostasis now on flomax. 8.  Fatigue.   PHQ mild depression at most.  Patient relates symptom onset to COVID, with worsening with recent severe UTI.  Gastroesophageal thickening and mild splenomegaly on recent CT, mild anemia since at least 2019. We will obtain labs EKG chest x-ray urinalysis to exclude evidence of recurrent UTI despite absence of fever CVAT or suprapubic tenderness on exam.    May need to pursue EGD and engage his hematologist.    No follow-ups on file. It was a pleasure to visit with Mr. Jose A Graham today. Answered all questions as best I could. Uzma Calix MD   Time 25 minutes.

## 2022-05-07 DIAGNOSIS — U09.9 LONG COVID: Primary | ICD-10-CM

## 2022-05-12 ENCOUNTER — OFFICE VISIT (OUTPATIENT)
Dept: SLEEP MEDICINE | Age: 67
End: 2022-05-12
Payer: MEDICARE

## 2022-05-12 VITALS
RESPIRATION RATE: 20 BRPM | TEMPERATURE: 96.9 F | HEIGHT: 69 IN | WEIGHT: 156.4 LBS | OXYGEN SATURATION: 98 % | SYSTOLIC BLOOD PRESSURE: 130 MMHG | BODY MASS INDEX: 23.16 KG/M2 | HEART RATE: 70 BPM | DIASTOLIC BLOOD PRESSURE: 80 MMHG

## 2022-05-12 DIAGNOSIS — R06.81 WITNESSED EPISODE OF APNEA: ICD-10-CM

## 2022-05-12 DIAGNOSIS — G47.33 OSA (OBSTRUCTIVE SLEEP APNEA): Primary | ICD-10-CM

## 2022-05-12 DIAGNOSIS — R06.83 SNORING: ICD-10-CM

## 2022-05-12 DIAGNOSIS — G47.19 EXCESSIVE DAYTIME SLEEPINESS: ICD-10-CM

## 2022-05-12 PROCEDURE — 99204 OFFICE O/P NEW MOD 45 MIN: CPT | Performed by: PSYCHIATRY & NEUROLOGY

## 2022-05-12 ASSESSMENT — ENCOUNTER SYMPTOMS
GASTROINTESTINAL NEGATIVE: 1
EYES NEGATIVE: 1
ALLERGIC/IMMUNOLOGIC NEGATIVE: 1
APNEA: 1
COUGH: 1

## 2022-05-12 ASSESSMENT — SLEEP AND FATIGUE QUESTIONNAIRES
ESS TOTAL SCORE: 7
NECK CIRCUMFERENCE (INCHES): 15
HOW LIKELY ARE YOU TO NOD OFF OR FALL ASLEEP WHILE SITTING INACTIVE IN A PUBLIC PLACE: 0
HOW LIKELY ARE YOU TO NOD OFF OR FALL ASLEEP IN A CAR, WHILE STOPPED FOR A FEW MINUTES IN TRAFFIC: 0
HOW LIKELY ARE YOU TO NOD OFF OR FALL ASLEEP WHILE WATCHING TV: 0
HOW LIKELY ARE YOU TO NOD OFF OR FALL ASLEEP WHILE SITTING AND TALKING TO SOMEONE: 0
HOW LIKELY ARE YOU TO NOD OFF OR FALL ASLEEP WHILE LYING DOWN TO REST IN THE AFTERNOON WHEN CIRCUMSTANCES PERMIT: 3
HOW LIKELY ARE YOU TO NOD OFF OR FALL ASLEEP WHILE SITTING AND READING: 1
HOW LIKELY ARE YOU TO NOD OFF OR FALL ASLEEP WHILE SITTING QUIETLY AFTER LUNCH WITHOUT ALCOHOL: 2
HOW LIKELY ARE YOU TO NOD OFF OR FALL ASLEEP WHEN YOU ARE A PASSENGER IN A CAR FOR AN HOUR WITHOUT A BREAK: 1

## 2022-05-12 NOTE — PATIENT INSTRUCTIONS
Orders Placed This Encounter   Procedures    Home Sleep Study     Standing Status:   Future     Standing Expiration Date:   5/12/2023     Order Specific Question:   Location For Sleep Study     Answer:   [place]     Order Specific Question:   Select Sleep Lab Location     Answer:   [facility]    Sleep Study with PAP Titration     Standing Status:   Future     Standing Expiration Date:   5/12/2023     Order Specific Question:   Sleep Study Titration Type     Answer:   CPAP     Order Specific Question:   Location For Sleep Study     Answer:   [place]     Order Specific Question:   Select Sleep Lab Location     Answer:   [facility]

## 2022-05-12 NOTE — PROGRESS NOTES
MD ARNULFO Bhatia Board Certified in Sleep Medicine  Certified [facility] Sleep Medicine  Board Certified in Neurology [address]ia [facility], [place] [address], [address] [facility] SLEEP MEDICINE [address] [address] [phone]    Subjective:     Patient ID: Parth Murillo is a 78 y.o. male. Chief Complaint   Patient presents with    Establish Care    Snoring       HPI:        Parth Murillo is a 78 y.o. male referred by Dr. Carla Mackey for a sleep evaluation. He complains of snoring, periods of not breathing, tossing and turning, excessive daytime sleepiness, feels sleepy during the day, take naps during the day, fatigue but he denies snorting, choking, knees buckling with laughing, completely or partially paralyzed while falling asleep or waking up, noisy environment, uncomfortable room temperature, uncomfortable bedding. Symptoms began a few years ago, gradually worsening since that time. Had COVID last year and his symptoms got worse. The patient's bed-partner confirmed the snoring and stopped breathing at night  SLEEP SCHEDULE: Goes to bed around 11 PM in the weekdays and 12 AM in the weekends. It usually takes the patient 10 minutes to fall asleep. The patient gets up 2-3 per night to go to the bathroom. The Patient finally gets up at 6:30 AM during the weekdays and 9 AM in the weekends. The patient has restless sleep with frequent arousals in addition to the Patient has significant daytime sleepiness. The Patient scored Total score: 7 on Clifton Sleepiness Scale ( more than 10 is indicative of daytime sleepiness)and 52 in fatigue scale ( more than 36 is indicative of daytime fatigue).  The patient takes 5 naps/week for 30 minutes and usually is not refreshing nap. Previous evaluation and treatment has included- none. DOT/CDL - N/A  ROSELINE/Amada - N/A      Previous Report(s) Reviewed: historical medical records       Social History     Socioeconomic History    Marital status:      Spouse name: Not on file    Number of children: Not on file    Years of education: Not on file    Highest education level: Not on file   Occupational History    Not on file   Tobacco Use    Smoking status: Former Smoker     Packs/day: 1.00     Years: 25.00     Pack years: 25.00     Types: Cigarettes     Quit date: 1999     Years since quittin.0    Smokeless tobacco: Never Used   Vaping Use    Vaping Use: Never used   Substance and Sexual Activity    Alcohol use:  Yes     Alcohol/week: 14.0 standard drinks     Types: 14 Cans of beer per week     Comment: 2 qhs    Drug use: Never    Sexual activity: Not on file   Other Topics Concern    Not on file   Social History Narrative    Not on file     Social Determinants of Health     Financial Resource Strain: Unknown    Difficulty of Paying Living Expenses: Patient refused   Food Insecurity: Unknown    Worried About Running Out of Food in the Last Year: Patient refused   [address] in the Last Year: Patient refused   Transportation Needs: Unknown    Lack of Transportation (Medical): Patient refused    Lack of Transportation (Non-Medical): Patient refused   Physical Activity: Unknown    Days of Exercise per Week: Patient refused    Minutes of Exercise per Session: Patient refused   Stress: Unknown    Feeling of Stress : Patient refused   Social Connections: Unknown    Frequency of Communication with Friends and Family: Patient refused    Frequency of Social Gatherings with Friends and Family: Patient refused    Attends Episcopalian Services: Patient refused    Active Member of Clubs or Organizations: Not on file    Attends Club or Organization Meetings: Not on file    Marital Status: Patient refused   Intimate Partner Violence: Unknown    Fear of Current or Ex-Partner: Patient refused    Emotionally Abused: Patient refused    Physically Abused: Patient refused    Sexually Abused: Patient refused   Housing Stability: Unknown    Unable to Pay for Housing in the Last Year: Patient refused    Number of Places Lived in the Last Year: Not on file    Unstable Housing in the Last Year: Patient refused       Prior to Admission medications    Medication Sig Start Date End Date Taking? Authorizing Provider   Multiple Vitamin (MULTIVITAMIN) tablet Take 1 tablet by mouth daily   Yes Historical Provider, MD   gemfibrozil (LOPID) 600 MG tablet Take 600 mg by mouth 2 times daily (before meals)   Yes Historical Provider, MD   apixaban (ELIQUIS STARTER PACK) 5 MG TABS tablet Take 10 mg (2 tablets) orally twice daily for 7 days, then take 5 mg (1 tablet) orally twice daily thereafter. (1st dose given in ED) 9/15/18  Yes LATRELL Jett   atorvastatin (LIPITOR) 80 MG tablet Take 1 tablet by mouth daily  Patient not taking: Reported on 5/6/2022 4/20/22   Cleda Gowers, MD       Allergies as of 05/12/2022    (No Known Allergies)       Patient Active Problem List   Diagnosis    CKD (chronic kidney disease) stage 3, GFR 30-59 ml/min [facility])    DVT (deep venous thrombosis) ([facility] [address]    Hypertriglyceridemia       Past Medical History:   Diagnosis Date    Diverticulitis     DVT (deep venous thrombosis) ([facility] [address]     Hyperlipidemia        Past Surgical History:   Procedure Laterality Date    COLONOSCOPY N/A 2/15/2022    COLONOSCOPY POLYPECTOMY SNARE/COLD BIOPSY performed by Annel Mckeon MD at [place] [id number]  2/15/2022    COLONOSCOPY WITH BIOPSY performed by Annel Mckeon MD at [address]       History reviewed. No pertinent family history. Review of Systems   Constitutional: Positive for diaphoresis and fatigue. HENT: Negative. Eyes: Negative.     Respiratory: Positive for apnea and cough. Cardiovascular: Negative. Negative for leg swelling. Gastrointestinal: Negative. Endocrine: Negative. Genitourinary: Positive for frequency. Musculoskeletal: Negative. Allergic/Immunologic: Negative. Neurological: Negative. Negative for headaches. Hematological: Negative. Psychiatric/Behavioral: Positive for decreased concentration. Objective:     Vitals:  Weight BMI Neck circumference    Wt Readings from Last 3 Encounters:   05/12/22 156 lb 6.4 oz (70.9 kg)   05/06/22 157 lb (71.2 kg)   04/20/22 152 lb (68.9 kg)    Body mass index is 23.43 kg/m². Neck circumference (Inches): 15     BP HR SaO2   BP Readings from Last 3 Encounters:   05/12/22 130/80   05/06/22 136/67   04/20/22 132/71    Pulse Readings from Last 3 Encounters:   05/12/22 70   05/06/22 76   04/20/22 71    SpO2 Readings from Last 3 Encounters:   05/12/22 98%   05/06/22 98%   04/20/22 99%        The mandibular molar Class :   []1 []2 []3      Mallampati I Airway Classification:   []1 []2 []3 []4        Physical Exam  Vitals and nursing note reviewed. Constitutional:       Appearance: Normal appearance. HENT:      Head: Atraumatic. Nose: Nose normal.      Mouth/Throat:      Comments: Mallampati class 4, no retrognathia or hypognathia , normal airflow in bilateral nostrils, no septum deviation , no tonsils enlargement. Eyes:      Extraocular Movements: Extraocular movements intact. Cardiovascular:      Rate and Rhythm: Normal rate and regular rhythm. Pulmonary:      Effort: Pulmonary effort is normal.      Breath sounds: Normal breath sounds. Musculoskeletal:         General: Normal range of motion. Cervical back: Normal range of motion and neck supple. Skin:     General: Skin is warm. Neurological:      General: No focal deficit present.    Psychiatric:         Mood and Affect: Mood normal.         Assessment:   Obstructive sleep apnea especially with snoring, observed apnea, daytime sleepiness, Mallampati class of 4. Diagnosis Orders   1. DONOVAN (obstructive sleep apnea)  Home Sleep Study    Sleep Study with PAP Titration   2. Snoring  Home Sleep Study   3. Witnessed episode of apnea  Home Sleep Study   4. Excessive daytime sleepiness  Home Sleep Study     Plan:     Patient was counseled about the pathophysiology of obstructive sleep apnea syndrome and the methods for evaluating its presence and severity. Patient was counseled to avoid driving and other potentially hazardous circumstances if the patient is experiencing excessive sleepiness. Treatment considerations include the use of nasal CPAP, oral dental appliance , inspire, In the meantime, the patient should be cautioned to avoid the use of alcohol or other depressant medications because of potential for increasing the duration and severity of apnea and cautioned regarding driving or operating and dangerous equipment if the patient is experiencing daytime sleepiness. .         Orders Placed This Encounter   Procedures    Home Sleep Study    Sleep Study with PAP Titration       Return for F/U between 31 and 90 days from the recieving CPAP.     Krista Carroll MD  Medical Director - [facility]

## 2022-06-01 ENCOUNTER — HOSPITAL ENCOUNTER (OUTPATIENT)
Dept: SLEEP CENTER | Age: 67
Discharge: HOME | End: 2022-06-01
Payer: MEDICARE

## 2022-06-01 DIAGNOSIS — G47.33 OSA (OBSTRUCTIVE SLEEP APNEA): ICD-10-CM

## 2022-06-01 DIAGNOSIS — R06.81 WITNESSED EPISODE OF APNEA: ICD-10-CM

## 2022-06-01 DIAGNOSIS — G47.19 EXCESSIVE DAYTIME SLEEPINESS: ICD-10-CM

## 2022-06-01 DIAGNOSIS — R06.83 SNORING: ICD-10-CM

## 2022-06-01 PROCEDURE — 95806 SLEEP STUDY UNATT&RESP EFFT: CPT

## 2022-06-02 DIAGNOSIS — G47.33 OSA (OBSTRUCTIVE SLEEP APNEA): Primary | ICD-10-CM

## 2022-06-03 ENCOUNTER — TELEPHONE (OUTPATIENT)
Dept: PULMONOLOGY | Age: 67
End: 2022-06-03

## 2022-06-03 PROCEDURE — 95806 SLEEP STUDY UNATT&RESP EFFT: CPT | Performed by: PSYCHIATRY & NEUROLOGY

## 2022-06-06 NOTE — TELEPHONE ENCOUNTER
Patient called Sleep Center to schedule testing. Patient was uncomfortable with \"inclusive results\" and would like to speak with Dr. Dang Parekh prior to scheduling in lab testing. Patient stated he would call us to schedule after that.

## 2022-06-07 NOTE — TELEPHONE ENCOUNTER
Will FWD to Dr Jeronimo Ga to see if he wants to call patient to discuss inclusive results then patient can schedule in lab study or come in to office for appt

## 2022-06-14 NOTE — TELEPHONE ENCOUNTER
Pt's cell number was not listed correctly on the acct  Updated  He asks that Dr Dang Parekh calls him back on his mobile number

## 2022-06-21 NOTE — PROGRESS NOTES
2022    Zunilda Rosario (:  1955) is a 78 y.o. male, here for evaluation of the following medical concerns:    No chief complaint on file. HPI  58-year-old male with history of seemingly unprovoked pulmonary embolism , distal LLE DVT 3/2021 (off a/c at presentation) now on lifelong anticoagulation, uncomplicated sigmoid diverticulitis , hypertriglyceridemia, history of GERD with possible esophagitis on CT no longer on acid suppressive therapy, who attends for fatigue. 25-pack-year smoker quit , but reportedly used to consume 15 ounces of alcohol a week. He describes having a couple of beers 5 nights a week but reportedly cut back on this substantially in recent months. Kidney function has been declining somewhat. CMP 1.0 , 1.2021 (1.2021) 1.2021. CT chest and CT abdomen in  and  comment on perinephric stranding bilaterally without hydronephrosis. No perinephric stranding noted on  CT obtained for UTI symptoms. Hemoglobin has been at the lower limit of normal 13.2 February 13.. Normal platelet WBC normal differential.  His hematologist organized a anemia work-up, elevated ferritin normal iron saturation B12 folate, neg UPEP/ SPEP. He was referred to nephrology for his kidney disease slowly rising creatinine perinephric stranding on CT [id number]. Nephrology noted positive DANAY and referred to rheumatology, seen once with upcoming follow-up appointment in July    Patient recently seen for UTI symptoms with leukocytosis pericystic stranding but no diverticulitis or intrapelvic abscess on CT, a couple months out from an otherwise uneventful colonoscopy. No recent urinary tract instrumentation, nocturia x1 historically with low PSA and normal sed rate in January prior to colonoscopy. Last seen for progressive fatigue.   He states this started about 18 months ago when he had COVID, and then markedly worsened after febrile urinary tract infection April 3 with radiographic evidence of cystitis, as well as suggestion of distal esophagus mild wall thickening and mild splenomegaly on CT 2022, new since 2021. He had some upset stomach on statin and loosening of stool that we recently started, which he stopped with improvement. Currently takes gemfibrozil and apixaban and a multivitamin and Flomax. He says he feels tired and sleepy all the time, with occasional night sweats, mild chest congestion, and a feeling like he has been up a couple days a row in his eyes not burning per se but just tired. Denies history of anxiety or depression. Recent CT notable for gastroesophageal junction thickening mild splenomegaly bladder wall thickening without lymphadenopathy. He has not had EGD or cystoscopy, underwent colonoscopy in early 2022 with a couple serrated polyps removed. Review of Systems   Constitutional: Negative for activity change, appetite change, fatigue and unexpected weight change. HENT: Negative for dental problem, sinus pain, sore throat and trouble swallowing. Eyes: Negative for pain and visual disturbance. Respiratory: Negative for apnea, cough, chest tightness, shortness of breath and wheezing. Cardiovascular: Negative for chest pain and palpitations. Gastrointestinal: Negative for abdominal pain, blood in stool, constipation, diarrhea, nausea, rectal pain and vomiting. Endocrine: Negative for cold intolerance, heat intolerance, polydipsia, polyphagia and polyuria. Genitourinary: Negative for difficulty urinating, dysuria, flank pain, frequency, hematuria, pelvic pain, urgency, . Musculoskeletal: Negative for arthralgias, back pain, gait problem, joint swelling, myalgias, neck pain and neck stiffness. Skin: Negative for color change and rash. Neurological: Negative for dizziness, tremors, syncope, speech difficulty, weakness, light-headedness and headaches. Hematological: Negative for adenopathy. Does not bruise/bleed easily. Psychiatric/Behavioral: Negative for agitation, behavioral problems, decreased concentration, sleep disturbance and suicidal ideas. The patient is not nervous/anxious and is not hyperactive. Prior to Visit Medications    Medication Sig Taking? Authorizing Provider   atorvastatin (LIPITOR) 80 MG tablet Take 1 tablet by mouth daily  Patient not taking: Reported on 2022  Snehal العلي MD   Multiple Vitamin (MULTIVITAMIN) tablet Take 1 tablet by mouth daily  Historical Provider, MD   gemfibrozil (LOPID) 600 MG tablet Take 600 mg by mouth 2 times daily (before meals)  Historical Provider, MD   apixaban (ELIQUIS STARTER PACK) 5 MG TABS tablet Take 10 mg (2 tablets) orally twice daily for 7 days, then take 5 mg (1 tablet) orally twice daily thereafter. (1st dose given in ED)  Gara Lanes, PA        Allergies   Allergen Reactions    Other        Past Medical History:   Diagnosis Date    Diverticulitis     DVT (deep venous thrombosis) ([facility] [address]     Hyperlipidemia        Past Surgical History:   Procedure Laterality Date    COLONOSCOPY N/A 2/15/2022    COLONOSCOPY POLYPECTOMY SNARE/COLD BIOPSY performed by Nola Machado MD at [address] [facility]  2/15/2022    COLONOSCOPY WITH BIOPSY performed by Nola Machado MD at [address] [facility] History     Socioeconomic History    Marital status:      Spouse name: Not on file    Number of children: Not on file    Years of education: Not on file    Highest education level: Not on file   Occupational History    Not on file   Tobacco Use    Smoking status: Former Smoker     Packs/day: 1.00     Years: 25.00     Pack years: 25.00     Types: Cigarettes     Quit date: 1999     Years since quittin.1    Smokeless tobacco: Never Used   Vaping Use    Vaping Use: Never used   Substance and Sexual Activity    Alcohol use:  Yes     Alcohol/week: 14.0 standard drinks     Types: 14 Cans of beer per week     Comment: 2 qhs    Drug use: Never    Sexual activity: Not on file   Other Topics Concern    Not on file   Social History Narrative    Not on file     Social Determinants of Health     Financial Resource Strain: Unknown    Difficulty of Paying Living Expenses: Patient refused   Food Insecurity: Unknown    Worried About Running Out of Food in the Last Year: Patient refused   [address] in the Last Year: Patient refused   Transportation Needs: Unknown    Lack of Transportation (Medical): Patient refused    Lack of Transportation (Non-Medical): Patient refused   Physical Activity: Unknown    Days of Exercise per Week: Patient refused    Minutes of Exercise per Session: Patient refused   Stress: Unknown    Feeling of Stress : Patient refused   Social Connections: Unknown    Frequency of Communication with Friends and Family: Patient refused    Frequency of Social Gatherings with Friends and Family: Patient refused    Attends Muslim Services: Patient refused    Active Member of Clubs or Organizations: Not on file    Attends Club or Organization Meetings: Not on file    Marital Status: Patient refused   Intimate Partner Violence: Unknown    Fear of Current or Ex-Partner: Patient refused    Emotionally Abused: Patient refused    Physically Abused: Patient refused    Sexually Abused: Patient refused   Housing Stability: Unknown    Unable to Pay for Housing in the Last Year: Patient refused    Number of Places Lived in the Last Year: Not on file    Unstable Housing in the Last Year: Patient refused    lives at home with his wife. 2 grown children son and daughter. 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 There were no vitals filed for this visit. Estimated body mass index is 23.43 kg/m² as calculated from the following:    Height as of 5/12/22: 5' 8.5\" (1.74 m).     Weight as of 5/12/22: 156 lb 6.4 oz (70.9 kg). PHYSICAL EXAM  GENERAL:  Pleasant quiet  male who looks his stated age, awake alert and oriented x3, no acute distress. LYMPH: No supraclavicular cervical axillary or inguinal lymphadenopathy  ABDOMEN:  Soft, nontender. Normal bowel sounds. No guarding. No masses. No CVAT. No appreciable splenomegaly. LUNGS: Clear to auscultation bilaterally. Good air entry. No inspiratory crackles or expiratory wheeze. HEART: Regular rate and rhythm without pathologic murmur gallop S3 is 4  UROGENITAL:  Deferred to urology  PSYCH: Mild psychomotor retardation. Good eye contact. Slightly restricted affect range. Mood congruent with affect. Linear thought. LABS  Lab Review     ASSESSMENT/PLAN  1. Stage 3 chronic kidney disease, unspecified whether stage 3a or 3b CKD (HCC)  Creatinine has fluctuated between 1.0 and 1.4 since 2018. Outside labs suggest gradually rising pattern. No chronic NSAID use at this time Gets up once at night to urinate. I am somewhat concerned about the abnormal appearance of his kidneys on CT abdomen in 2019, previously explained to patient that blood urine testing kidney imaging with attention paid to renal artery should be considered. Referred to nephrology, Dr. Dragan Ceja, who obtained ultrasound without arterial Dopplers, positive DANAY, borderline proteinuria, and referred to Dr. José Manuel Agudelo in rheumatology. 2. 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 ATIII, protein C&S prothrombin gene mutation factor V Leiden anticardiolipin antibody panel negative therefore patient has an unidentified thrombophilia in view of 2 unprovoked events in this physically active male. Sees Dr. Glenda Bell in hematology, last seen May 2022, without change in management.     Both his parents were on Coumadin for unclear reasons, and he has children. Eldest daughter has not yet been pregnant. 3. Hypertriglyceridemia. Mixed hyperlipidemia. Fatty liver disease but no history of pancreatitis though chronic alcohol consumption enhances risk. 2000 triglycerides 2840, and 2004 4697. Since then triglycerides have ranged between 500-900, 600 now, with alcohol abstinence and fibrate. With LDL in the 130s, 10-year risk heart attack or stroke 14.6% for which we tried start atorvastatin 80 mg, which was unfortunately poorly tolerated. Anticipate addition Vascepa versus Lovaza. Evidently took some form of fish oil without substantial benefit in the past.    Will trial low-dose dose rosuvastatin, patient to let me know if tolerates with regards to GI distress loosening of stool in which case we will complete fasting lipids LFTs in 2 months. On dual lipid-lowering therapy should have LFTs every 6 months. 4. Routine adult health maintenance  Eligible for Tdap Shingrix [id number] Influenza shots, declines all of them. Completed Covid booster #1 Pfizer. No AAA on CT. HIV HCV screen - January 2022. TSH PSA A1c all within normal range. Mild vitamin D deficiency, defer to nephrology. 5. Hx of adenomatous colonic polyps  History of large 1 cm polyp 2014, appears to have had subsequent colonoscopy after uncomplicated diverticulitis in [id number], and serrated adenoma removed February 2022. 6. elevated ferritin, resolved. Normal iron saturation. No polycythemia, actually borderline anemic. On recheck ferritin and CRP were normal January 2022.    7.  Severe Klebsiella UTI. No recent urinary tract instrumentation. Couple months out from colonoscopy patient experienced UTI despite low PSA, with leukocytosis, growing out ampicillin resistant Klebsiella treated with a week of Cipro with improvement. No suprapubic tenderness or cuff to angle tenderness at this time. He is back to getting up once at night to urinate.   He had a CT which showed no obvious fistula or diverticulitis, so we wonder about false negative CT versus urostasis, started on Flomax with nocturia once to twice at night with a postvoid residual by testing in 2022 of 0 mL. 8.  Fatigue, improving. PHQ mild depression at most.  Patient relates symptom onset to COVID, with worsening with recent severe UTI. Gastroesophageal thickening and mild splenomegaly on recent CT, mild anemia since at least 2019. CRP transiently elevated, has positive DANAY abnormal appearance of kidneys for the past few years, without substantial proteinuria (bland urine) and normal complement levels. Low sensitivity 14-3-3 ETA protein synovial inflammation marker came back negative. Thyroid hemoglobin liver and kidney function appear noncontributory. Home sleep apnea test negative, however given patient's fatigue, sleep medicine recommends pursuing formal sonography. Referred to [facility] [place], operated by [facility] however patient feels sufficiently better this did not wish to pursue. 9.  Mild GE J thickening on CT chest abdomen pelvis, April 2022. The thickening might represent esophageal contraction, this finding is new since prior images in 2019 and 2021. No solid food dysphagia. Denies symptomatic GERD not using PPI regularly. Given mild anemia, fatigue, recommend EGD. Instructed patient to hold DOAC for 3 doses prior to study, resume at the discretion of the gastroenterologist based on whether any biopsies are performed. May need to pursue EGD and engage his hematologist.    No follow-ups on file. It was a pleasure to visit with Mr. Jenkins Neftali today. Answered all questions as best I could. Brea Lauren MD   Time 35 minutes.

## 2022-06-22 ENCOUNTER — OFFICE VISIT (OUTPATIENT)
Dept: PRIMARY CARE CLINIC | Age: 67
End: 2022-06-22
Payer: MEDICARE

## 2022-06-22 VITALS
DIASTOLIC BLOOD PRESSURE: 70 MMHG | WEIGHT: 156 LBS | HEART RATE: 72 BPM | BODY MASS INDEX: 23.37 KG/M2 | OXYGEN SATURATION: 97 % | TEMPERATURE: 97.2 F | SYSTOLIC BLOOD PRESSURE: 122 MMHG

## 2022-06-22 VITALS
OXYGEN SATURATION: 97 % | DIASTOLIC BLOOD PRESSURE: 70 MMHG | TEMPERATURE: 97.2 F | WEIGHT: 156 LBS | RESPIRATION RATE: 20 BRPM | SYSTOLIC BLOOD PRESSURE: 122 MMHG | HEIGHT: 69 IN | BODY MASS INDEX: 23.11 KG/M2 | HEART RATE: 72 BPM

## 2022-06-22 DIAGNOSIS — G47.10 HYPERSOMNIA: ICD-10-CM

## 2022-06-22 DIAGNOSIS — Z00.00 WELCOME TO MEDICARE PREVENTIVE VISIT: Primary | ICD-10-CM

## 2022-06-22 DIAGNOSIS — R93.5 ABNORMAL CT OF THE ABDOMEN: Primary | ICD-10-CM

## 2022-06-22 DIAGNOSIS — E78.1 HYPERTRIGLYCERIDEMIA: ICD-10-CM

## 2022-06-22 DIAGNOSIS — E78.2 MIXED HYPERLIPIDEMIA: ICD-10-CM

## 2022-06-22 PROCEDURE — G0402 INITIAL PREVENTIVE EXAM: HCPCS | Performed by: NURSE PRACTITIONER

## 2022-06-22 PROCEDURE — 99213 OFFICE O/P EST LOW 20 MIN: CPT | Performed by: INTERNAL MEDICINE

## 2022-06-22 PROCEDURE — 1123F ACP DISCUSS/DSCN MKR DOCD: CPT | Performed by: INTERNAL MEDICINE

## 2022-06-22 PROCEDURE — 1123F ACP DISCUSS/DSCN MKR DOCD: CPT | Performed by: NURSE PRACTITIONER

## 2022-06-22 RX ORDER — ROSUVASTATIN CALCIUM 10 MG/1
10 TABLET, COATED ORAL DAILY
Qty: 30 TABLET | Refills: 5 | Status: SHIPPED | OUTPATIENT
Start: 2022-06-22 | End: 2022-08-17 | Stop reason: SDUPTHER

## 2022-06-22 ASSESSMENT — PATIENT HEALTH QUESTIONNAIRE - PHQ9
SUM OF ALL RESPONSES TO PHQ QUESTIONS 1-9: 0
1. LITTLE INTEREST OR PLEASURE IN DOING THINGS: 0
2. FEELING DOWN, DEPRESSED OR HOPELESS: 0
SUM OF ALL RESPONSES TO PHQ QUESTIONS 1-9: 0
SUM OF ALL RESPONSES TO PHQ QUESTIONS 1-9: 0
SUM OF ALL RESPONSES TO PHQ9 QUESTIONS 1 & 2: 0
SUM OF ALL RESPONSES TO PHQ QUESTIONS 1-9: 0

## 2022-06-22 ASSESSMENT — LIFESTYLE VARIABLES
HOW OFTEN DO YOU HAVE A DRINK CONTAINING ALCOHOL: 2-3 TIMES A WEEK
HOW MANY STANDARD DRINKS CONTAINING ALCOHOL DO YOU HAVE ON A TYPICAL DAY: 1 OR 2

## 2022-06-22 NOTE — PATIENT INSTRUCTIONS
Patient Education        Recombinant Zoster (Shingles) Vaccine: What You Need to Know  Why get vaccinated? Recombinant zoster (shingles) vaccine can prevent shingles. Shingles (also called herpes zoster, or just zoster) is a painful skin rash, usually with blisters. In addition to the rash, shingles can cause fever, headache, chills, or upset stomach. Rarely, shingles can lead to complications such as pneumonia, hearing problems, blindness, brain inflammation (encephalitis), ordeath. The risk of shingles increases with age. The most common complication of shingles is long-term nerve pain called postherpetic neuralgia (PHN). PHN occurs in the areas where the shingles rash was and can last for months or years after the rash goes away. The pain from PHN can be severe anddebilitating. The risk of PHN increases with age. An older adult with shingles is more likely to develop PHN and have longer lasting and more severe pain than a youngerperson. People with weakened immune systems also have a higher risk of getting shinglesand complications from the disease. Shingles is caused by varicella-zoster virus, the same virus that causes chickenpox. After you have chickenpox, the virus stays in your body and can cause shingles later in life. Shingles cannot be passed from one person to another, but the virus that causes shingles can spread and cause chickenpox insomeone who has never had chickenpox or has never received chickenpox vaccine. Recombinant shingles vaccine  Recombinant shingles vaccine provides strong protection against shingles. By preventing shingles, recombinant shingles vaccine also protects against PHN andother complications. Recombinant shingles vaccine is recommended for:   Adults 48 years and older   Adults 19 years and older who have a weakened immune system because of disease or treatments  Shingles vaccine is given as a two-dose series.  For most people, the second dose should be given 2 to 6 months after the first dose. Some people who have or will have a weakened immune system can get the second dose 1 to 2 monthsafter the first dose. Ask your health care provider for guidance. People who have had shingles in the past and people who have received varicella (chickenpox) vaccine are recommended to get recombinant shingles vaccine. The vaccine is also recommended for people who have already gotten another type of shingles vaccine, the live shingles vaccine. There is no live virus inrecombinant shingles vaccine. Shingles vaccine may be given at the same time as other vaccines. Talk with your health care provider  Tell your vaccination provider if the person getting the vaccine:   Has had an allergic reaction after a previous dose of recombinant shingles vaccine, or has any severe, life-threatening allergies   Is currently experiencing an episode of shingles   Is pregnant  In some cases, your health care provider may decide to postpone shinglesvaccination until a future visit. People with minor illnesses, such as a cold, may be vaccinated. People who are moderately or severely ill should usually wait until they recover beforegetting recombinant shingles vaccine. Your health care provider can give you more information. Risks of a vaccine reaction   A sore arm with mild or moderate pain is very common after recombinant shingles vaccine. Redness and swelling can also happen at the site of the injection.  Tiredness, muscle pain, headache, shivering, fever, stomach pain, and nausea are common after recombinant shingles vaccine. These side effects may temporarily prevent a vaccinated person from doing regular activities. Symptoms usually go away on their own in 2 to 3 days. You should still get the second dose of recombinant shingles vaccine even if youhad one of these reactions after the first dose.   Guillain-Barré syndrome (GBS), a serious nervous system disorder, has beenreported very rarely after recombinant zoster vaccine. People sometimes faint after medical procedures, including vaccination. Tellyour provider if you feel dizzy or have vision changes or ringing in the ears. As with any medicine, there is a very remote chance of a vaccine causing asevere allergic reaction, other serious injury, or death. What if there is a serious problem? An allergic reaction could occur after the vaccinated person leaves the clinic. If you see signs of a severe allergic reaction (hives, swelling of the face and throat, difficulty breathing, a fast heartbeat, dizziness, or weakness), call 9-1-1 and get the person to the nearest hospital.  For other signs that concern you, call your health care provider. Adverse reactions should be reported to the Vaccine Adverse Event Reporting System (VAERS). Your health care provider will usually file this report, or you can do it yourself. Visit the VAERS website at www.vaers. [facility].gov or call 7-202.238.4156. VAERS is only for reporting reactions, and VAERS staff members do not give medical advice. How can I learn more?  Ask your health care provider.  Call your local or state health department.  Visit the website of the Food and Drug Administration (FDA) for vaccine package inserts and additional information at www.fda.gov/vaccines-blood-biologics/vaccines.  Contact the Centers for Disease Control and Prevention (CDC):  ? Call 7-546.675.7915 (1-800-CDC-INFO) or  ? Visit CDCs website at www.cdc.gov/vaccines. Vaccine Information Statement  Recombinant Zoster Vaccine  02/04/2022  Department of Health and Human Services  Centers for Disease Control and Prevention  Many vaccine information statements are available in Telugu and other languages. See www.immunize.org/vis  Hojas de información sobre vacunas están disponibles en español y en muchos otros idiomas. Visite Michael.si  Care instructions adapted under license by [facility] ([facility]).  If you have questions about a medical condition or this instruction, always ask your healthcare professional. [place] [address] any warranty or liability for your use of this information. Patient Education        pneumococcal polysaccharides vaccine (PPSV), 23-valent  Pronunciation: MORE [place], 23-REINALDO bowling  Brand: Pneumovax 23  What is the most important information I should know about this vaccine? You should not receive a booster vaccine if you had a life-threatening allergicreaction after the first shot. What is pneumococcal polysaccharides vaccine (PPSV)? Pneumococcal disease is a serious infection caused by a bacteria that can infect the sinuses, inner ear, lungs, blood, and brain. These conditions can befatal.  Pneumococcal polysaccharides vaccine (PPSV) is used to help prevent disease caused by pneumococcal bacteria. This vaccine contains 23 different types ofpneumococcal bacteria. PPSV is for use in adults 48 years and older, and in people at least 3years old who have an increased risk of developing pneumococcal disease due tocertain medical conditions. The Centers for Disease Control and Prevention (CDC) recommends this vaccine in adults 72 years and older even if they had a pneumococcal vaccine before theage of 72. This vaccine helps your body develop immunity to the disease, but will nottreat an active infection you already have. Like any vaccine, pneumococcal polysaccharides vaccine may not provideprotection from disease in every person. What should I discuss with my healthcare provider before receiving this vaccine? You should not receive this vaccine if you ever had a severe allergic reactionto a pneumococcal vaccine. Tell the vaccination provider if you or the child has:   heart problems;   a breathing disorder;   a weak immune system (caused by disease or by using certain medicine); or   if you are receiving radiation or chemotherapy.   You can still receive a vaccine if you have a minor cold. In the case of a more severe illness with a fever or any type of infection, wait until you get betterbefore receiving this vaccine. Tell the vaccination provider if you are pregnant or breastfeeding. How is this vaccine given? This vaccine is given as an injection (shot) into a muscle or under the skin. PPSV is usually given as 1 shot. You may need another shot if you are at highrisk of infection with pneumococcal bacteria. Keep taking any antibiotic your doctor has prescribed to help protect youagainst pneumococcal disease. What happens if I miss a dose? Since PPSV is usually given only one time, you will most likely not be on adosing schedule. What happens if I overdose? An overdose of this vaccine is unlikely to occur. What should I avoid after receiving this vaccine? Follow your doctor's instructions about any restrictions on food, beverages, oractivity. What are the possible side effects of this vaccine? Get emergency medical help if you have signs of an allergic reaction: hives; difficult breathing; swelling of your face, lips, tongue, or throat. You should not receive a booster vaccine if you had a life threatening allergicreaction after the first shot. Keep track of all side effects you have. If you need a booster dose, you will need to tell the vaccination provider if the previous shot caused any sideeffects. Becoming infected with pneumococcal disease is much more dangerous to your health than receiving this vaccine. However, like any medicine, this vaccinecan cause side effects but the risk of serious side effects is low. Call your doctor at once if you have:   wheezing, trouble breathing;   chest pain;   severe stomach pain, severe vomiting or diarrhea;   tremors, muscle stiffness; or   painful or difficult urination.   Common side effects may include:   pain, warmth, swelling, redness, or a hard lump where a shot was given;   muscle pain;   headache; or   feeling weak or tired. This is not a complete list of side effects and others may occur. Call your doctor for medical advice about side effects. You may report vaccine sideeffects to the [address] [place] [address] [place] at [phone]. What other drugs will affect pneumococcal polysaccharides vaccine (PPSV)? Before receiving this vaccine, tell your vaccination provider about all othervaccines you have recently received, especially a zoster (shingles) vaccine. Also tell the vaccination provider if you have recently received drugs ortreatments that can weaken the immune system, including:   steroid medicine;   medications to treat psoriasis, rheumatoid arthritis, or other autoimmune disorders; or   medicines to treat or prevent organ transplant rejection. This list is not complete. Other drugs may affect PPSV, including prescription and over-the-counter medicines, vitamins, and herbal products. Not all possibledrug interactions are listed here. Where can I get more information? Your vaccination provider, pharmacist, or doctor can provide more information about this vaccine. Additional information is available from your local [facility] or the Centers for Disease Control and Prevention. Remember, keep this and all other medicines out of the reach of children, never share your medicines with others, and use this medication only for the indication prescribed. Every effort has been made to ensure that the information provided by [address] is accurate, up-to-date, and complete, but no guarantee is made to that effect. Drug information contained herein may be time sensitive. [facility] information has been compiled for use by healthcare practitioners and consumers in the [place] and therefore [facility] does not warrant that uses outside of the [place] are appropriate, unless specifically indicated otherwise.  [facility]'s drug information does not endorse drugs, diagnose patients or recommend therapy. [facility]'s drug information is an informational resource designed to assist licensed healthcare practitioners in caring for their patients and/or to serve consumers viewing this service as a supplement to, and not a substitute for, the expertise, skill, knowledge and judgment of healthcare practitioners. The absence of a warning for a given drug or drug combination in no way should be construed to indicate that the drug or drug combination is safe, effective or appropriate for any given patient. [facility] does not assume any responsibility for any aspect of healthcare administered with the aid of information [facility] provides. The information contained herein is not intended to cover all possible uses, directions, precautions, warnings, drug interactions, allergic reactions, or adverse effects. If you have questions about the drugs you are taking, check with yourdoctor, nurse or pharmacist.  Copyright 7965-4311 [address] Version: 6.01. Revision date:10/12/2021. Care instructions adapted under license by [facility] ([facility]). If you have questions about a medical condition or this instruction, always ask your healthcare professional. [place] [address] any warranty or liability for your use of this information. Personalized Preventive Plan for Romana Marten - 6/22/2022  Medicare offers a range of preventive health benefits. Some of the tests and screenings are paid in full while other may be subject to a deductible, co-insurance, and/or copay. Some of these benefits include a comprehensive review of your medical history including lifestyle, illnesses that may run in your family, and various assessments and screenings as appropriate. After reviewing your medical record and screening and assessments performed today your provider may have ordered immunizations, labs, imaging, and/or referrals for you.   A list of these orders (if applicable) as well as your Preventive Care list are included within your After Visit Summary for your review. Other Preventive Recommendations:    · A preventive eye exam performed by an eye specialist is recommended every 1-2 years to screen for glaucoma; cataracts, macular degeneration, and other eye disorders. · A preventive dental visit is recommended every 6 months. · Try to get at least 150 minutes of exercise per week or 10,000 steps per day on a pedometer . · Order or download the FREE \"Exercise & Physical Activity: Your Everyday Guide\" from [employer]. Call 0-328.506.2372 or search [employer] online. · You need 2854-3537 mg of calcium and 8018-1801 IU of vitamin D per day. It is possible to meet your calcium requirement with diet alone, but a vitamin D supplement is usually necessary to meet this goal.  · When exposed to the sun, use a sunscreen that protects against both UVA and UVB radiation with an SPF of 30 or greater. Reapply every 2 to 3 hours or after sweating, drying off with a towel, or swimming. · Always wear a seat belt when traveling in a car. Always wear a helmet when riding a bicycle or motorcycle.

## 2022-06-22 NOTE — PATIENT INSTRUCTIONS
1. Ref for EGD  2. Will hold eliquis for 3 doses before,and 24 hours after scope  3. Trial low dose rosuvastatin, msg me re GI tolerance  4. If tolerate statin, would check liver cholesterol in 8/2022ish.  Orders put into system just in case

## 2022-06-22 NOTE — PROGRESS NOTES
Medicare Annual Wellness Visit    Cristina Edwards is here for Medicare AWV    Assessment & Plan   Welcome to Medicare preventive visit      Recommendations for Preventive Services Due: see orders and patient instructions/AVS.  Recommended screening schedule for the next 5-10 years is provided to the patient in written form: see Patient Instructions/AVS.     Return for Medicare Annual Wellness Visit in 1 year. Subjective       Patient's complete Health Risk Assessment and screening values have been reviewed and are found in Flowsheets. The following problems were reviewed today and where indicated follow up appointments were made and/or referrals ordered. Positive Risk Factor Screenings with Interventions:               General Health and ACP:  General  In general, how would you say your health is?: Very Good  In the past 7 days, have you experienced any of the following: New or Increased Pain, New or Increased Fatigue, Loneliness, Social Isolation, Stress or Anger?: No  Do you get the social and emotional support that you need?: Yes  Do you have a Living Will?: (!) No    Advance Directives     Power of  Living Will ACP-Advance Directive ACP-Power of     Not on File Not on File Not on File Not on File      General Health Risk Interventions:  · No Living Will: Advance Care Planning addressed with patient today    Health Habits/Nutrition:     Physical Activity: Sufficiently Active    Days of Exercise per Week: 7 days    Minutes of Exercise per Session: 60 min     Have you lost any weight without trying in the past 3 months?: (!) Yes  Body mass index: 23.37  Have you seen the dentist within the past year?: Yes    Health Habits/Nutrition Interventions:  · reports regular exercise.  Has had dental exam within last year    Hearing/Vision:  Do you or your family notice any trouble with your hearing that hasn't been managed with hearing aids?: (!) Yes  Do you have difficulty driving, watching TV, or doing any of your daily activities because of your eyesight?: No  Have you had an eye exam within the past year?: Yes  No exam data present    Hearing/Vision Interventions:  · has tinnitis. No vision concerns            Objective   Vitals:    06/22/22 0957   BP: 122/70   Site: Right Upper Arm   Position: Sitting   Pulse: 72   Resp: 20   Temp: 97.2 °F (36.2 °C)   SpO2: 97%   Weight: 156 lb (70.8 kg)   Height: 5' 8.5\" (1.74 m)      Body mass index is 23.37 kg/m². Allergies   Allergen Reactions    Other      Prior to Visit Medications    Medication Sig Taking?  Authorizing Provider   rosuvastatin (CRESTOR) 10 MG tablet Take 1 tablet by mouth daily Yes Haylie Sinclair MD   Multiple Vitamin (MULTIVITAMIN) tablet Take 1 tablet by mouth daily Yes Historical Provider, MD   gemfibrozil (LOPID) 600 MG tablet Take 600 mg by mouth 2 times daily (before meals) Yes Historical Provider, MD   apixaban (ELIQUIS STARTER PACK) 5 MG TABS tablet Take 10 mg (2 tablets) orally twice daily for 7 days, then take 5 mg (1 tablet) orally twice daily thereafter. (1st dose given in ED) Yes LATRELL Sim       [facility] (Including outside providers/suppliers regularly involved in providing care):   Patient Care Team:  Haylie Sinclair MD as PCP - General (Internal Medicine)  Haylie Sinclair MD as PCP - [facility] Empaneled Provider     Reviewed and updated this visit:  Tobacco  Allergies  Meds  Problems  Med Hx  Surg Hx  Soc Hx  Fam Hx

## 2022-07-29 ENCOUNTER — HOSPITAL ENCOUNTER (OUTPATIENT)
Dept: SLEEP CENTER | Age: 67
Discharge: HOME | End: 2022-07-29
Payer: MEDICARE

## 2022-07-29 DIAGNOSIS — G47.33 OSA (OBSTRUCTIVE SLEEP APNEA): ICD-10-CM

## 2022-07-29 PROCEDURE — 95810 POLYSOM 6/> YRS 4/> PARAM: CPT

## 2022-08-01 PROBLEM — G47.33 OSA (OBSTRUCTIVE SLEEP APNEA): Status: ACTIVE | Noted: 2022-08-01

## 2022-08-01 PROCEDURE — 95810 POLYSOM 6/> YRS 4/> PARAM: CPT | Performed by: PSYCHIATRY & NEUROLOGY

## 2022-08-02 ENCOUNTER — TELEPHONE (OUTPATIENT)
Dept: PULMONOLOGY | Age: 67
End: 2022-08-02

## 2022-08-02 NOTE — TELEPHONE ENCOUNTER
Inconclusive home study on 6/1/22 showed no sleep disorder breathing. AHI 2.1. Recommended PSG. Current PSG study shows mild DONOVAN. AHI was 8.1  per hr. And O2 Desaturations to 89%. Dr Katie Vela: Follow up with the patient's sleep physician to discuss results  A trial of CPAP titration is recommended with supplemental oxygen per protocol if needed. Avoid sedatives, alcohol and caffeinated drinks at bedtime. Avoid driving while sleepy.      LMOM to call

## 2022-08-09 NOTE — TELEPHONE ENCOUNTER
LIV patient and gave him his results. [facility] titration [facility].

## 2022-08-15 DIAGNOSIS — E78.2 MIXED HYPERLIPIDEMIA: ICD-10-CM

## 2022-08-15 LAB
ALT SERPL-CCNC: 18 U/L (ref 10–40)
AST SERPL-CCNC: 19 U/L (ref 15–37)
CHOLESTEROL, TOTAL: 243 MG/DL (ref 0–199)
HDLC SERPL-MCNC: 34 MG/DL (ref 40–60)
LDL CHOLESTEROL CALCULATED: ABNORMAL MG/DL
LDL CHOLESTEROL DIRECT: 121 MG/DL
TRIGL SERPL-MCNC: 498 MG/DL (ref 0–150)
VLDLC SERPL CALC-MCNC: ABNORMAL MG/DL

## 2022-08-17 ENCOUNTER — OFFICE VISIT (OUTPATIENT)
Dept: PRIMARY CARE CLINIC | Age: 67
End: 2022-08-17
Payer: MEDICARE

## 2022-08-17 VITALS
TEMPERATURE: 98.1 F | WEIGHT: 157 LBS | HEART RATE: 71 BPM | DIASTOLIC BLOOD PRESSURE: 71 MMHG | SYSTOLIC BLOOD PRESSURE: 128 MMHG | OXYGEN SATURATION: 97 % | BODY MASS INDEX: 23.52 KG/M2

## 2022-08-17 DIAGNOSIS — Z00.00 ROUTINE HEALTH MAINTENANCE: ICD-10-CM

## 2022-08-17 DIAGNOSIS — L30.9 HAND DERMATITIS: ICD-10-CM

## 2022-08-17 DIAGNOSIS — Z01.818 PRE-OP EVALUATION: ICD-10-CM

## 2022-08-17 DIAGNOSIS — D64.9 ANEMIA, UNSPECIFIED TYPE: ICD-10-CM

## 2022-08-17 DIAGNOSIS — E78.2 MIXED HYPERLIPIDEMIA: Primary | ICD-10-CM

## 2022-08-17 DIAGNOSIS — Z51.81 THERAPEUTIC DRUG MONITORING: ICD-10-CM

## 2022-08-17 DIAGNOSIS — N18.31 CHRONIC RENAL FAILURE, STAGE 3A (HCC): ICD-10-CM

## 2022-08-17 DIAGNOSIS — E55.9 VITAMIN D DEFICIENCY: ICD-10-CM

## 2022-08-17 PROCEDURE — 99214 OFFICE O/P EST MOD 30 MIN: CPT | Performed by: INTERNAL MEDICINE

## 2022-08-17 PROCEDURE — 1123F ACP DISCUSS/DSCN MKR DOCD: CPT | Performed by: INTERNAL MEDICINE

## 2022-08-17 RX ORDER — ROSUVASTATIN CALCIUM 20 MG/1
20 TABLET, COATED ORAL DAILY
Qty: 30 TABLET | Refills: 1 | Status: SHIPPED | OUTPATIENT
Start: 2022-08-17 | End: 2022-09-01

## 2022-08-17 ASSESSMENT — PATIENT HEALTH QUESTIONNAIRE - PHQ9
SUM OF ALL RESPONSES TO PHQ9 QUESTIONS 1 & 2: 0
2. FEELING DOWN, DEPRESSED OR HOPELESS: 0
SUM OF ALL RESPONSES TO PHQ QUESTIONS 1-9: 0
SUM OF ALL RESPONSES TO PHQ QUESTIONS 1-9: 0
1. LITTLE INTEREST OR PLEASURE IN DOING THINGS: 0
SUM OF ALL RESPONSES TO PHQ QUESTIONS 1-9: 0
SUM OF ALL RESPONSES TO PHQ QUESTIONS 1-9: 0

## 2022-08-17 NOTE — PATIENT INSTRUCTIONS
For EGD Monday, fast that morning (no meds or food)  2. HOLD eliquis after Saturday AM dose. 3. Resume per endoscopists instructions, typically next morning (extent of biopsy-ing determines)  4. Hydrocortisone 2.5% 2x/day to dermatitis on your palm. Let me know if not getting better, in 3 weeks. 5. 500-2000 IU vit D daily  6.  Fasting blood test 6 months, see Dr Lita Nixon (or Dr Nenita Farr)

## 2022-08-17 NOTE — PROGRESS NOTES
2022    Marifer Vargas (:  1955) is a 78 y.o. male, here for evaluation of the following medical concerns:    Chief Complaint   Patient presents with    Follow-up       HPI  67-year-old male with history of seemingly unprovoked pulmonary embolism , distal LLE DVT 3/2021 (off a/c at presentation) now on lifelong anticoagulation, uncomplicated sigmoid diverticulitis , hypertriglyceridemia, history of GERD with possible esophagitis on CT no longer on acid suppressive therapy, who attends for preop evaluation for upcoming EGD. 25-pack-year smoker quit , but reportedly used to consume 15 ounces of alcohol a week. He describes having a couple of beers 5 nights a week but reportedly cut back on this substantially in recent months. Kidney function has been declining somewhat. CMP 1.0 , 1.2021 (1.2021) 1.2021. CT chest and CT abdomen in  and  comment on perinephric stranding bilaterally without hydronephrosis. No perinephric stranding noted on  CT obtained for UTI symptoms. Followed at [facility] nephrology, felt to have chronic interstitial nephritis, no biopsy but positive DANAY, referral to rheumatology. Patient was seen for UTI symptoms in   with leukocytosis pericystic stranding but no diverticulitis or intrapelvic abscess on CT, a couple months out from an otherwise uneventful colonoscopy. No recent urinary tract instrumentation, nocturia x1 historically with low PSA and normal sed rate in January prior to colonoscopy. Last seen for progressive fatigue. Mild anemia positive DANAY mild obstructive sleep apnea on sleep study. Upcoming EGD colonoscopy without evidence of neoplasm. Denies history of anxiety or depression. Recent CT notable for gastroesophageal junction thickening mild splenomegaly bladder wall thickening without lymphadenopathy.   He has not had EGD or cystoscopy, underwent colonoscopy in early  with a couple headaches. Hematological: Negative for adenopathy. Does not bruise/bleed easily. Psychiatric/Behavioral: Negative for agitation, behavioral problems, decreased concentration, sleep disturbance and suicidal ideas. The patient is not nervous/anxious and is not hyperactive. Prior to Visit Medications    Medication Sig Taking? Authorizing Provider   rosuvastatin (CRESTOR) 20 MG tablet Take 1 tablet by mouth daily Take 1 tablet by mouth daily Yes Isabella Munson MD   Multiple Vitamin (MULTIVITAMIN) tablet Take 1 tablet by mouth daily Yes Historical Provider, MD   gemfibrozil (LOPID) 600 MG tablet Take 600 mg by mouth 2 times daily (before meals) Yes Historical Provider, MD   apixaban (ELIQUIS STARTER PACK) 5 MG TABS tablet Take 10 mg (2 tablets) orally twice daily for 7 days, then take 5 mg (1 tablet) orally twice daily thereafter. (1st dose given in ED) Yes LATRELL Darnell        Allergies   Allergen Reactions    Other        Past Medical History:   Diagnosis Date    Diverticulitis     DVT (deep venous thrombosis) ([facility] [address]     Hyperlipidemia        Past Surgical History:   Procedure Laterality Date    COLONOSCOPY N/A 2/15/2022    COLONOSCOPY POLYPECTOMY SNARE/COLD BIOPSY performed by Rina Weiner MD at [facility]    COLONOSCOPY  2/15/2022    COLONOSCOPY WITH BIOPSY performed by Rina Weiner MD at [address] [facility] History     Socioeconomic History    Marital status:      Spouse name: Not on file    Number of children: Not on file    Years of education: Not on file    Highest education level: Not on file   Occupational History    Not on file   Tobacco Use    Smoking status: Former     Packs/day: 1.00     Years: 25.00     Pack years: 25.00     Types: Cigarettes     Quit date: 1999     Years since quittin.2    Smokeless tobacco: Never   Vaping Use    Vaping Use: Never used   Substance and Sexual Activity    Alcohol use:  Yes     Alcohol/week: 14.0 standard drinks Types: 14 Cans of beer per week     Comment: 2 qhs    Drug use: Never    Sexual activity: Not on file   Other Topics Concern    Not on file   Social History Narrative    Not on file     Social Determinants of Health     Financial Resource Strain: Unknown    Difficulty of Paying Living Expenses: Patient refused   Food Insecurity: Unknown    Worried About Running Out of Food in the Last Year: Patient refused    [address] in the Last Year: Patient refused   Transportation Needs: Unknown    Lack of Transportation (Medical): Patient refused    Lack of Transportation (Non-Medical): Patient refused   Physical Activity: Sufficiently Active    Days of Exercise per Week: 7 days    Minutes of Exercise per Session: 60 min   Stress: Unknown    Feeling of Stress : Patient refused   Social Connections: Unknown    Frequency of Communication with Friends and Family: Patient refused    Frequency of Social Gatherings with Friends and Family: Patient refused    Attends Islam Services: Patient refused    Active Member of Clubs or Organizations: Not on file    Attends Club or Organization Meetings: Not on file    Marital Status: Patient refused   Intimate Partner Violence: Unknown    Fear of Current or Ex-Partner: Patient refused    Emotionally Abused: Patient refused    Physically Abused: Patient refused    Sexually Abused: Patient refused   Housing Stability: Unknown    Unable to Pay for Housing in the Last Year: Patient refused    Number of Places Lived in the Last Year: Not on file    Unstable Housing in the Last Year: Patient refused    lives at home with his wife. 2 grown children son and daughter. 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     Vitals:    08/17/22 0834   BP: 128/71   Pulse: 71   Temp: 98.1 °F (36.7 °C)   TempSrc: Temporal   SpO2: 97%   Weight: 157 lb (71.2 kg)     Estimated body mass index is 23.52 kg/m² as calculated from the following:    Height as of 6/22/22: 5' 8.5\" (1.74 m). Weight as of this encounter: 157 lb (71.2 kg). PHYSICAL EXAM  GENERAL:  Pleasant quiet  male who looks his stated age, awake alert and oriented x3, no acute distress. HEENT: Normocephalic atraumatic pupils equal reactive light accommodation extract muscles are intact mildly erythematous turbinates oropharynx is clear moist without exudate, mildly injected. Mallampati 2. No dentures. Neck: No carotid bruits brisk upstrokes no thyromegaly no JVD. Able to extend neck to 70 degrees above the horizontal plane. LYMPH: No supraclavicular cervical axillary or inguinal lymphadenopathy  ABDOMEN:  Soft, nontender. Normal bowel sounds. No guarding. No masses. No CVAT. No appreciable splenomegaly. LUNGS: Clear to auscultation bilaterally. Good air entry. No inspiratory crackles or expiratory wheeze. HEART: Regular rate and rhythm without pathologic murmur gallop S3 is 4  UROGENITAL:  Deferred to urology  EXTREMITIES: Warm and well perfused without clubbing cyanosis or edema. NEURO: Grossly nonfocal.  SKIN: 2 x 1 cm pruritic papule on the palm of the right hand. PSYCH: Mild psychomotor retardation. Good eye contact. Slightly restricted affect range. Mood congruent with affect. Linear thought. LABS  Lab Review     ASSESSMENT/PLAN  1. Stage 3 chronic kidney disease, unspecified whether stage 3a or 3b CKD (HCC)  Creatinine has fluctuated between 1.0 and 1.4 since 2018. Outside labs suggest gradually rising pattern. No chronic NSAID use at this time Gets up once at night to urinate. I am somewhat concerned about the abnormal appearance of his kidneys on CT abdomen in 2019, previously explained to patient that blood urine testing kidney imaging with attention paid to renal artery should be considered. Referred to nephrology, Dr. Jane Ceballos, who obtained ultrasound without arterial Dopplers, positive DANAY, borderline proteinuria, and referred to Dr. Debbie Hernandez in rheumatology.     2. 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 He is on lifelong anticoagulation. ATIII, protein C&S prothrombin gene mutation factor V Leiden anticardiolipin antibody panel negative therefore patient has an unidentified thrombophilia in view of 2 unprovoked events in this physically active male. Sees Dr. Henrique Mohan in hematology, last seen May 2022, without change in management. Both his parents were on Coumadin for unclear reasons, and he has children. Eldest daughter has not yet been pregnant. 3. Hypertriglyceridemia. Mixed hyperlipidemia. Fatty liver disease but no history of pancreatitis though chronic alcohol consumption enhances risk. 2000 triglycerides 2840, and 2004 4697. Since then triglycerides have ranged between 500-900, 600 now, with alcohol abstinence and fibrate. With LDL in the 130s, 10-year risk heart attack or stroke 14.6% for which we tried start atorvastatin 80 mg, which was unfortunately poorly tolerated (upset stomach). Anticipate addition Vascepa versus Lovaza. Evidently took some form of fish oil without substantial benefit in the past.    Successful trial low-dose dose rosuvastatin, LDL down 25 points, for which given risk of cardiovascular disease step up to 20 mg daily prescription sent. My goal would be to use him up to 40 mg to provide maximal primary prevention. On dual lipid-lowering therapy should have LFTs every 6 months. 4. Routine adult health maintenance  Eligible for Tdap Shingrix [id number] Influenza shots, declines all of them. Completed Covid booster #1 Pfizer. No AAA on CT. HIV HCV screen - January 2022. TSH PSA A1c all within normal range. Mild vitamin D deficiency, deferred supplementation to nephrology.   Rheumatology recommended taking some unspecified amount which I believe he is purchasing over-the-counter. Specified he should take 500 to 2000 units daily, update levels in 6 months. 5. Hx of adenomatous colonic polyps  History of large 1 cm polyp 2014, appears to have had subsequent colonoscopy after uncomplicated diverticulitis in [id number], and serrated adenoma removed February 2022. 6. elevated ferritin, resolved. Normal iron saturation. No polycythemia, actually borderline anemic. On recheck ferritin and CRP were normal January 2022.    7.  Severe Klebsiella UTI. No recent urinary tract instrumentation. Couple months out from colonoscopy patient experienced UTI despite low PSA, with leukocytosis, growing out ampicillin resistant Klebsiella treated with a week of Cipro with improvement. No suprapubic tenderness or cuff to angle tenderness at this time. He is back to getting up once at night to urinate. He had a CT which showed no obvious fistula or diverticulitis, so we wonder about false negative CT versus urostasis, started on Flomax with nocturia once to twice at night with a postvoid residual by testing in 2022 of 0 mL. 8.  Fatigue, improving. PHQ mild depression at most.  Patient relates symptom onset to COVID, with worsening with recent severe UTI. Gastroesophageal thickening and mild splenomegaly on recent CT, mild anemia since at least 2019. CRP transiently elevated, has positive DANAY abnormal appearance of kidneys for the past few years, without substantial proteinuria (bland urine) and normal complement levels. Low sensitivity 14-3-3 ETA protein synovial inflammation marker came back negative. Thyroid hemoglobin liver and kidney function appear noncontributory. Home sleep apnea test negative, however given patient's fatigue, sleep medicine recommends pursuing formal sonography. Referred to [facility] however patient feels sufficiently better this did not wish to pursue.     9.  Mild GE J thickening on CT chest abdomen pelvis, April 2022. The thickening might represent esophageal contraction, this finding is new since prior images in 2019 and 2021. No solid food dysphagia. Denies symptomatic GERD not using PPI regularly. Given mild anemia, fatigue, recommend EGD. Instructed patient to hold DOAC for 48 hours or 3 doses prior to study, resume at the discretion of the gastroenterologist based on whether any biopsies are performed. Patient has no signs or symptoms of active infection no evidence of decompensated heart pulmonary disease, no melena hematochezia hematuria, has tolerated sedation for endoscopy in the past without complication. He is optimized for the procedure. 10. Elevated DANAY. Rheum felt no active disease, including kidney. No trx or bx recommended. #11 hand dermatitis. Pruritic papule on the palm. Hydrocortisone 2.5% twice daily he will let me know in 3 weeks response. May need higher potency steroid. Return in about 6 months (around 2/17/2023). Fasting labs preordered    It was a pleasure to visit with Mr. Miri Mays today. Answered all questions as best I could. Luis Giordano MD   Time 35 minutes.

## 2022-08-24 RX ORDER — TAMSULOSIN HYDROCHLORIDE 0.4 MG/1
0.4 CAPSULE ORAL NIGHTLY
COMMUNITY

## 2022-08-24 NOTE — PROGRESS NOTES
[facility] time____0945________        Surgery time_____1115_______    Take the following medications with a sip of water: Follow your MD/Surgeons pre-procedure instructions regarding your medications     Do not eat or drink anything after 12:00 midnight prior to your surgery. This includes water chewing gum, mints and ice chips. You may brush your teeth and gargle the morning of your surgery, but do not swallow the water     Please see your family doctor/pediatrician for a history and physical and/or concerning medications. Bring any test results/reports from your physicians office. If you are under the care of a heart doctor or specialist doctor, please be aware that you may be asked to them for clearance    You may be asked to stop blood thinners such as Coumadin, Plavix, Fragmin, Lovenox, etc., or any anti-inflammatories such as:  Aspirin, Ibuprofen, Advil, Naproxen prior to your surgery. We also ask that you stop any OTC medications such as fish oil, vitamin E, glucosamine, garlic, Multivitamins, COQ 10, etc.    We ask that you do not smoke 24 hours prior to surgery  We ask that you do not  drink any alcoholic beverages 24 hours prior to surgery     You must make arrangements for a responsible adult to take you home after your surgery. For your safety you will not be allowed to leave alone or drive yourself home. Your surgery will be cancelled if you do not have a ride home. Also for your safety, it is strongly suggested that someone stay with you the first 24 hours after your surgery. A parent or legal guardian must accompany a child scheduled for surgery and plan to stay at the hospital until the child is discharged. Please do not bring other children with you. For your comfort, please wear simple loose fitting clothing to the hospital.  Please do not bring valuables.     Do not wear any make-up or nail polish on your fingers or toes      For your safety, please do not wear any jewelry or body piercing's on the day of surgery. All jewelry must be removed. If you have dentures, they will be removed before going to operating room. For your convenience, we will provide you with a container. If you wear contact lenses or glasses, they will be removed, please bring a case for them. If you have a living will and a durable power of  for healthcare, please bring in a copy. As part of our patient safety program to minimize surgical site infections, we ask you to do the following:    Please notify your surgeon if you develop any illness between         now and the  day of your surgery. This includes a cough, cold, fever, sore throat, nausea,         or vomiting, and diarrhea, etc.   Please notify your surgeon if you experience dizziness, shortness         of breath or blurred vision between now and the time of your surgery. Do not shave your operative site 96 hours prior to surgery. For face and neck surgery, men may use an electric razor 48 hours   prior to surgery. You may shower the night before surgery or the morning of   your surgery with an antibacterial soap. You will need to bring a photo ID and insurance card    [facility] has an onsite pharmacy, would you like to utilize our pharmacy     If you will be staying overnight and use a C-pap machine, please bring   your C-pap to hospital     Our goal is to provide you with excellent care, therefore, visitors will be limited to two(2) in the room at a time so that we may focus on providing this care for you. Please contact pre-admission testing if you have any further questions. [facility] phone number:  [phone] PAT fax number:  [phone]  Please note these are generalized instructions for all surgical cases, you may be provided with more specific instructions according to your surgery.     C-Difficile admission

## 2022-08-26 ENCOUNTER — ANESTHESIA EVENT (OUTPATIENT)
Dept: ENDOSCOPY | Age: 67
End: 2022-08-26
Payer: MEDICARE

## 2022-08-29 ENCOUNTER — ANESTHESIA (OUTPATIENT)
Dept: ENDOSCOPY | Age: 67
End: 2022-08-29
Payer: MEDICARE

## 2022-08-29 ENCOUNTER — HOSPITAL ENCOUNTER (OUTPATIENT)
Age: 67
Setting detail: OUTPATIENT SURGERY
Discharge: HOME | End: 2022-08-29
Attending: INTERNAL MEDICINE | Admitting: INTERNAL MEDICINE
Payer: MEDICARE

## 2022-08-29 VITALS
RESPIRATION RATE: 16 BRPM | BODY MASS INDEX: 23.54 KG/M2 | HEIGHT: 69 IN | WEIGHT: 158.95 LBS | HEART RATE: 69 BPM | TEMPERATURE: 96.8 F | SYSTOLIC BLOOD PRESSURE: 122 MMHG | OXYGEN SATURATION: 95 % | DIASTOLIC BLOOD PRESSURE: 65 MMHG

## 2022-08-29 DIAGNOSIS — D64.9 ANEMIA, UNSPECIFIED TYPE: ICD-10-CM

## 2022-08-29 PROCEDURE — 7100000000 HC PACU RECOVERY - FIRST 15 MIN: Performed by: INTERNAL MEDICINE

## 2022-08-29 PROCEDURE — 7100000001 HC PACU RECOVERY - ADDTL 15 MIN: Performed by: INTERNAL MEDICINE

## 2022-08-29 PROCEDURE — 2709999900 HC NON-CHARGEABLE SUPPLY: Performed by: INTERNAL MEDICINE

## 2022-08-29 PROCEDURE — 7100000011 HC PHASE II RECOVERY - ADDTL 15 MIN: Performed by: INTERNAL MEDICINE

## 2022-08-29 PROCEDURE — 2580000003 HC RX 258: Performed by: ANESTHESIOLOGY

## 2022-08-29 PROCEDURE — 88305 TISSUE EXAM BY PATHOLOGIST: CPT

## 2022-08-29 PROCEDURE — 2500000003 HC RX 250 WO HCPCS: Performed by: NURSE ANESTHETIST, CERTIFIED REGISTERED

## 2022-08-29 PROCEDURE — 3609012400 HC EGD TRANSORAL BIOPSY SINGLE/MULTIPLE: Performed by: INTERNAL MEDICINE

## 2022-08-29 PROCEDURE — 3700000000 HC ANESTHESIA ATTENDED CARE: Performed by: INTERNAL MEDICINE

## 2022-08-29 PROCEDURE — 6360000002 HC RX W HCPCS: Performed by: NURSE ANESTHETIST, CERTIFIED REGISTERED

## 2022-08-29 PROCEDURE — 3700000001 HC ADD 15 MINUTES (ANESTHESIA): Performed by: INTERNAL MEDICINE

## 2022-08-29 PROCEDURE — 7100000010 HC PHASE II RECOVERY - FIRST 15 MIN: Performed by: INTERNAL MEDICINE

## 2022-08-29 RX ORDER — PROPOFOL 10 MG/ML
INJECTION, EMULSION INTRAVENOUS CONTINUOUS PRN
Status: DISCONTINUED | OUTPATIENT
Start: 2022-08-29 | End: 2022-08-29 | Stop reason: SDUPTHER

## 2022-08-29 RX ORDER — SODIUM CHLORIDE 9 MG/ML
INJECTION, SOLUTION INTRAVENOUS CONTINUOUS
Status: DISCONTINUED | OUTPATIENT
Start: 2022-08-29 | End: 2022-08-29 | Stop reason: HOSPADM

## 2022-08-29 RX ORDER — SODIUM CHLORIDE 0.9 % (FLUSH) 0.9 %
5-40 SYRINGE (ML) INJECTION PRN
Status: DISCONTINUED | OUTPATIENT
Start: 2022-08-29 | End: 2022-08-29 | Stop reason: HOSPADM

## 2022-08-29 RX ORDER — SODIUM CHLORIDE 9 MG/ML
INJECTION, SOLUTION INTRAVENOUS PRN
Status: DISCONTINUED | OUTPATIENT
Start: 2022-08-29 | End: 2022-08-29 | Stop reason: HOSPADM

## 2022-08-29 RX ORDER — SODIUM CHLORIDE 0.9 % (FLUSH) 0.9 %
5-40 SYRINGE (ML) INJECTION EVERY 12 HOURS SCHEDULED
Status: DISCONTINUED | OUTPATIENT
Start: 2022-08-29 | End: 2022-08-29 | Stop reason: HOSPADM

## 2022-08-29 RX ORDER — LIDOCAINE HYDROCHLORIDE 20 MG/ML
INJECTION, SOLUTION EPIDURAL; INFILTRATION; INTRACAUDAL; PERINEURAL PRN
Status: DISCONTINUED | OUTPATIENT
Start: 2022-08-29 | End: 2022-08-29 | Stop reason: SDUPTHER

## 2022-08-29 RX ADMIN — LIDOCAINE HYDROCHLORIDE 50 MG: 20 INJECTION, SOLUTION EPIDURAL; INFILTRATION; INTRACAUDAL; PERINEURAL at 10:37

## 2022-08-29 RX ADMIN — PROPOFOL 100 MG: 10 INJECTION, EMULSION INTRAVENOUS at 10:38

## 2022-08-29 RX ADMIN — PROPOFOL 160 MCG/KG/MIN: 10 INJECTION, EMULSION INTRAVENOUS at 10:39

## 2022-08-29 RX ADMIN — SODIUM CHLORIDE: 9 INJECTION, SOLUTION INTRAVENOUS at 10:17

## 2022-08-29 ASSESSMENT — LIFESTYLE VARIABLES: SMOKING_STATUS: 0

## 2022-08-29 ASSESSMENT — PAIN - FUNCTIONAL ASSESSMENT: PAIN_FUNCTIONAL_ASSESSMENT: 0-10

## 2022-08-29 NOTE — PROGRESS NOTES
Patient admitted to PACU from OR. Patient asleep, opens eyes to name, drowsy. Resp easy unlabored on 3LNC with SAO2 96%. Abdomen soft. VSS. IV patent.

## 2022-08-29 NOTE — H&P
Pre-operative History and Physical    Patient: Alexandr Rashid  : 1955  Acct#:     Intended Procedure:  EGD    HISTORY OF PRESENT ILLNESS:  The patient is a 78 y.o. male  who presents for/due to Abnormal CT/Esophageal thickening     Past Medical History:        Diagnosis Date    Diverticulitis     DVT (deep venous thrombosis) (HCC)     Hyperlipidemia     Sleep apnea     NO C-PAP    UTI (urinary tract infection)      Past Surgical History:        Procedure Laterality Date    COLONOSCOPY N/A 2/15/2022    COLONOSCOPY POLYPECTOMY SNARE/COLD BIOPSY performed by Toni Vasquez MD at [facility]  2/15/2022    COLONOSCOPY WITH BIOPSY performed by Toni Vasquez MD at [address] [place] [address]     Medications Prior to Admission:   Prior to Admission medications    Medication Sig Start Date End Date Taking? Authorizing Provider   tamsulosin (FLOMAX) 0.4 MG capsule Take 0.4 mg by mouth at bedtime   Yes Historical Provider, MD   rosuvastatin (CRESTOR) 20 MG tablet Take 1 tablet by mouth daily Take 1 tablet by mouth daily 22   Arturo Clemente MD   Multiple Vitamin (MULTIVITAMIN) tablet Take 1 tablet by mouth every other day    Historical Provider, MD   gemfibrozil (LOPID) 600 MG tablet Take 600 mg by mouth 2 times daily (before meals)    Historical Provider, MD   apixaban (ELIQUIS STARTER PACK) 5 MG TABS tablet Take 10 mg (2 tablets) orally twice daily for 7 days, then take 5 mg (1 tablet) orally twice daily thereafter. (1st dose given in ED) 9/15/18   LATRELL Calderon       Allergies:  No known allergies    Social History:   TOBACCO:   reports that he quit smoking about 23 years ago. His smoking use included cigarettes. He has a 25.00 pack-year smoking history. He has never used smokeless tobacco.  ETOH:   reports current alcohol use of about 14.0 standard drinks per week. DRUGS:   reports no history of drug use.     PHYSICAL EXAM:      Vital Signs: Ht 5' 9\" (1.753 m)   Wt 155 lb

## 2022-08-29 NOTE — OP NOTE
Endoscopy Note    Patient: Ofelia Anderson  : 1955  Acct#:     Procedure: Esophagogastroduodenoscopy with biopsy                         Date:  2022     Surgeon:   Edgard Bauer MD    Referring Physician:  Miri Norton MD    Indications: This is a 78y.o. year old male who presents today with Abnormal CT/Esophageal thickening/Anemia     Postoperative Diagnosis:  1. Normal EGD-Biopsies performed to evaluate for Celiac disease. Anesthesia:  The patient was administered IV propofol per anesthesiology team.  Please see their operative records for full details. Consent:  The patient or their legal guardian has signed an informed consent, and is aware of the potential risks, benefits, alternatives, and potential complications of this procedure. These include, but are not limited to hemorrhage, bleeding, post procedural pain, perforation, phlebitis, aspiration, hypotension, hypoxia, cardiovascular events such as arryhthmia, and possibly death. Description of Procedure: The patient was then taken to the endoscopy suite, placed in the left lateral decubitus position and the above IV sedation was administrered. The Olympus video endoscope was placed through the patient's oropharynx without difficulty to the extent of the 2nd portion of the duodenum. Both forward and retroflexed views of the stomach were obtained. Findings:    Esophagus: The esophagus appeared normal without evidence of Tierney's esophagus or reflux esophagitis. No  pathology found in the distal esophagus and suspect thickening is secondary to peristalsis. Stomach: The stomach appeared normal on forward and retroflexed views. Duodenum: The first and 2nd portions of the duodenum appeared normal with normal villous pattern. No AVMs present. Biopsies performed to evaluate for Celiac disease.      Estimated Blood Loss (mL): < 5 CC     Complications: None    Specimens:   ID Type Source Tests Collected by Time Destination   A : duodenum biopsy  Tissue Duodenum SURGICAL PATHOLOGY Teresa Gonzalez MD 8/29/2022 1042        The scope was then withdrawn back into the stomach, it was decompressed, and the scope was completely withdrawn. The patient tolerated the procedure well and was taken to the post anesthesia care unit in good condition. Impression:   1) See post procedure diagnoses    Recommendations:   1) Await pathology results   2) Patient's colonoscopy is up to date. He denies any overt GI blood loss. If biopsies negative for Celiac and has any evidence of ongoing iron deficiency anemia would recommend a capsule endoscopy. 3) The patient had biopsies taken today. The patient should call for results in 7 days if they have not heard from our office. Our number is [phone].     Teresa Gonzalez MD  [address] and [address] [facility]

## 2022-08-29 NOTE — ANESTHESIA PRE PROCEDURE
Department of Anesthesiology  Preprocedure Note       Name:  Stella Paez   Age:  78 y.o.  :  1955                                          MRN:  [id number]         Date:  2022      Surgeon: Gamaliel Ham):  Iker Cartagena MD    Procedure: Procedure(s):  ESOPHAGOGASTRODUODENOSCOPY    Medications prior to admission:   Prior to Admission medications    Medication Sig Start Date End Date Taking? Authorizing Provider   tamsulosin (FLOMAX) 0.4 MG capsule Take 0.4 mg by mouth at bedtime    Historical Provider, MD   rosuvastatin (CRESTOR) 20 MG tablet Take 1 tablet by mouth daily Take 1 tablet by mouth daily 22   Isela Owusu MD   Multiple Vitamin (MULTIVITAMIN) tablet Take 1 tablet by mouth every other day    Historical Provider, MD   gemfibrozil (LOPID) 600 MG tablet Take 600 mg by mouth 2 times daily (before meals)    Historical Provider, MD   apixaban (ELIQUIS STARTER PACK) 5 MG TABS tablet Take 10 mg (2 tablets) orally twice daily for 7 days, then take 5 mg (1 tablet) orally twice daily thereafter. (1st dose given in ED) 9/15/18   LATRELL Caicedo       Current medications:    No current facility-administered medications for this visit. No current outpatient medications on file. Facility-Administered Medications Ordered in Other Visits   Medication Dose Route Frequency Provider Last Rate Last Admin    0.9 % sodium chloride infusion   IntraVENous Continuous Gabriela Layton MD        sodium chloride flush 0.9 % injection 5-40 mL  5-40 mL IntraVENous 2 times per day Gabriela Layton MD        sodium chloride flush 0.9 % injection 5-40 mL  5-40 mL IntraVENous PRN Gabriela Layton MD        0.9 % sodium chloride infusion   IntraVENous PRN Gabriela Layton MD           Allergies:     Allergies   Allergen Reactions    No Known Allergies        Problem List:    Patient Active Problem List   Diagnosis Code    CKD (chronic kidney disease) stage 3, GFR 30-59 ml/min (HCC) N18.30    DVT (deep venous thrombosis) ([facility]) I82.409    Hypertriglyceridemia E78.1    Hypersomnia G47.10    DONOVAN (obstructive sleep apnea) G47.33       Past Medical History:        Diagnosis Date    Diverticulitis     DVT (deep [facility] thrombosis) ([facility])     Hyperlipidemia     Sleep apnea     NO C-PAP    UTI (urinary tract infection)        Past Surgical History:        Procedure Laterality Date    COLONOSCOPY N/A 2/15/2022    COLONOSCOPY POLYPECTOMY SNARE/COLD BIOPSY performed by Benjamín Tabor MD at [address] [facility] COLONOSCOPY  2/15/2022    COLONOSCOPY WITH BIOPSY performed by Benjamín Tabor MD at [address] [facility] ENDOSCOPY       Social History:    Social History     Tobacco Use    Smoking status: Former     Packs/day: 1.00     Years: 25.00     Pack years: 25.00     Types: Cigarettes     Quit date: 1999     Years since quittin.3    Smokeless tobacco: Never   Substance Use Topics    Alcohol use: Yes     Alcohol/week: 14.0 standard drinks     Types: 14 Cans of beer per week     Comment: 2 qhs                                Counseling given: Not Answered      Vital Signs (Current): There were no vitals filed for this visit.                                            BP Readings from Last 3 Encounters:   22 (!) 150/82   22 128/71   22 122/70       NPO Status:  >8H                                                                               BMI:   Wt Readings from Last 3 Encounters:   22 158 lb 15.2 oz (72.1 kg)   22 157 lb (71.2 kg)   22 156 lb (70.8 kg)     There is no height or weight on file to calculate BMI.    CBC:   Lab Results   Component Value Date/Time    WBC 7.4 2022 11:57 AM    RBC 4.23 2022 11:57 AM    HGB 13.1 2022 11:57 AM    HCT 39.3 2022 11:57 AM    MCV 93.0 2022 11:57 AM    RDW 13.7 2022 11:57 AM     2022 11:57 AM       CMP:   Lab Results   Component Value Date/Time    NA 137 07/01/2022 11:09 AM    K 4.8 07/01/2022 11:09 AM    K 4.0 04/03/2022 12:30 PM     07/01/2022 11:09 AM    CO2 22 07/01/2022 11:09 AM    BUN 19 07/01/2022 11:09 AM    CREATININE 1.1 07/01/2022 11:09 AM    GFRAA >60 07/01/2022 11:09 AM    AGRATIO 1.9 07/01/2022 11:09 AM    LABGLOM >60 07/01/2022 11:09 AM    GLUCOSE 95 07/01/2022 11:09 AM    PROT 7.0 07/01/2022 11:09 AM    CALCIUM 9.5 07/01/2022 11:09 AM    BILITOT 0.5 07/01/2022 11:09 AM    ALKPHOS 61 07/01/2022 11:09 AM    AST 19 08/15/2022 02:13 PM    ALT 18 08/15/2022 02:13 PM       POC Tests: No results for input(s): POCGLU, POCNA, POCK, POCCL, POCBUN, POCHEMO, POCHCT in the last 72 hours.     Coags:   Lab Results   Component Value Date/Time    PROTIME 13.7 03/25/2021 04:10 PM    INR 1.18 03/25/2021 04:10 PM    APTT 36.4 03/25/2021 04:10 PM       HCG (If Applicable): No results found for: PREGTESTUR, PREGSERUM, HCG, HCGQUANT     ABGs: No results found for: PHART, PO2ART, BAH9QOJ, VKQ9VWP, BEART, Z7BVYVHL     Type & Screen (If Applicable):  No results found for: LABABO, LABRH    Drug/Infectious Status (If Applicable):  No results found for: HIV, HEPCAB    COVID-19 Screening (If Applicable):   Lab Results   Component Value Date/Time    COVID19 Not Detected 02/11/2022 09:22 AM           Anesthesia Evaluation  Patient summary reviewed no history of anesthetic complications:   Airway: Mallampati: II          Dental: normal exam         Pulmonary: breath sounds clear to auscultation  (+) sleep apnea:      (-) COPD, asthma and not a current smoker                           Cardiovascular:  Exercise tolerance: good (>4 METS),   (+) hyperlipidemia    (-) hypertension, CABG/stent and  angina        Rate: normal                    Neuro/Psych:      (-) seizures, TIA and CVA           GI/Hepatic/Renal:   (+) renal disease:,      (-) GERD       Endo/Other:    (+) blood dyscrasia::., .    (-) diabetes mellitus, hypothyroidism               Abdominal:

## 2022-08-29 NOTE — PROGRESS NOTES
Pt tolerating po. Discharge instructions given to patient and his wife. Verbalized understanding. IV d/c'd.

## 2022-08-29 NOTE — PROGRESS NOTES
Patient awake and alert. Resp easy unlabored on room air O2 with SAO2 95%. Abdomen soft. Patient denies C/O pain or nausea. VSS. Patient stable to transfer to ACU for phase II.

## 2022-08-29 NOTE — DISCHARGE INSTRUCTIONS
Recommendations:   1) Await pathology results   2) Patient's colonoscopy is up to date. He denies any overt GI blood loss. If biopsies negative for Celiac and has any evidence of ongoing iron deficiency anemia would recommend a capsule endoscopy. 3) The patient had biopsies taken today. The patient should call for results in 7 days if they have not heard from our office. Our number is [phone].

## 2022-08-29 NOTE — ANESTHESIA POSTPROCEDURE EVALUATION
Department of Anesthesiology  Postprocedure Note    Patient: Marifer Vargas  MRN: [id number]  YOB: 1955  Date of evaluation: 8/29/2022      Procedure Summary     Date: 08/29/22 Room / Location: [address] [place] [address]    Anesthesia Start: 3156 Anesthesia Stop: 0279    Procedure: EGD BIOPSY Diagnosis:       Anemia, unspecified type      (ANEMIA)    Surgeons: Josiane Lao MD Responsible Provider: Wes Louie MD    Anesthesia Type: MAC ASA Status: 3          Anesthesia Type: No value filed.     James Phase I: James Score: 10    James Phase II: James Score: 10      Anesthesia Post Evaluation    Patient location during evaluation: PACU  Patient participation: complete - patient participated  Level of consciousness: awake and alert  Pain score: 0  Airway patency: patent  Nausea & Vomiting: no nausea and no vomiting  Complications: no  Cardiovascular status: blood pressure returned to baseline  Respiratory status: acceptable  Hydration status: euvolemic

## 2022-09-01 ENCOUNTER — OFFICE VISIT (OUTPATIENT)
Dept: PRIMARY CARE CLINIC | Age: 67
End: 2022-09-01
Payer: MEDICARE

## 2022-09-01 VITALS
BODY MASS INDEX: 23.25 KG/M2 | TEMPERATURE: 97.9 F | DIASTOLIC BLOOD PRESSURE: 72 MMHG | SYSTOLIC BLOOD PRESSURE: 137 MMHG | WEIGHT: 157 LBS | HEART RATE: 66 BPM | HEIGHT: 69 IN | OXYGEN SATURATION: 98 %

## 2022-09-01 DIAGNOSIS — E78.2 MIXED HYPERLIPIDEMIA: ICD-10-CM

## 2022-09-01 DIAGNOSIS — R20.0 RIGHT ARM NUMBNESS: Primary | ICD-10-CM

## 2022-09-01 DIAGNOSIS — L30.9 HAND DERMATITIS: Primary | ICD-10-CM

## 2022-09-01 PROCEDURE — 99213 OFFICE O/P EST LOW 20 MIN: CPT | Performed by: NURSE PRACTITIONER

## 2022-09-01 PROCEDURE — 1123F ACP DISCUSS/DSCN MKR DOCD: CPT | Performed by: NURSE PRACTITIONER

## 2022-09-01 RX ORDER — METHOCARBAMOL 750 MG/1
750 TABLET, FILM COATED ORAL 4 TIMES DAILY
Qty: 40 TABLET | Refills: 0 | Status: SHIPPED | OUTPATIENT
Start: 2022-09-01 | End: 2022-09-11

## 2022-09-01 RX ORDER — ROSUVASTATIN CALCIUM 20 MG/1
20 TABLET, COATED ORAL DAILY
Qty: 90 TABLET | Refills: 1 | Status: SHIPPED | OUTPATIENT
Start: 2022-09-01

## 2022-09-01 RX ORDER — PREDNISONE 10 MG/1
TABLET ORAL
Qty: 18 TABLET | Refills: 0 | Status: SHIPPED | OUTPATIENT
Start: 2022-09-01

## 2022-09-01 ASSESSMENT — PATIENT HEALTH QUESTIONNAIRE - PHQ9
SUM OF ALL RESPONSES TO PHQ QUESTIONS 1-9: 0
2. FEELING DOWN, DEPRESSED OR HOPELESS: 0
SUM OF ALL RESPONSES TO PHQ QUESTIONS 1-9: 0
SUM OF ALL RESPONSES TO PHQ QUESTIONS 1-9: 0
SUM OF ALL RESPONSES TO PHQ9 QUESTIONS 1 & 2: 0
1. LITTLE INTEREST OR PLEASURE IN DOING THINGS: 0
SUM OF ALL RESPONSES TO PHQ QUESTIONS 1-9: 0

## 2022-09-01 NOTE — PROGRESS NOTES
and myalgias. Skin:  Negative for rash. Neurological:  Positive for numbness. Negative for dizziness, light-headedness and headaches. Psychiatric/Behavioral:  Negative for agitation, decreased concentration and sleep disturbance. The patient is not nervous/anxious. Objective    height is 5' 9\" (1.753 m) and weight is 157 lb (71.2 kg). His temperature is 97.9 °F (36.6 °C). His blood pressure is 137/72 and his pulse is 66. His oxygen saturation is 98%. BP Readings from Last 3 Encounters:   22 137/72   22 122/65   22 128/71      Wt Readings from Last 3 Encounters:   22 157 lb (71.2 kg)   22 158 lb 15.2 oz (72.1 kg)   22 157 lb (71.2 kg)      Past Medical History:   Diagnosis Date    Diverticulitis     [facility]     Hyperlipidemia     Sleep apnea     NO C-PAP    UTI (urinary tract infection)       Past Surgical History:   Procedure Laterality Date    COLONOSCOPY N/A 02/15/2022    COLONOSCOPY POLYPECTOMY SNARE/COLD BIOPSY performed by Nery Gaitan MD at [address] [place] [address]    COLONOSCOPY  02/15/2022    COLONOSCOPY WITH BIOPSY performed by Nery Gaitan MD at [address] [place] [address]    ESOPHAGOGASTRODUODENOSCOPY  2022    Dr. Norm Mckoy N/A 2022    EGD BIOPSY performed by Margoth Jordan MD at [address] [place] [address] History   Problem Relation Age of Onset    Alzheimer's Disease Mother     High Cholesterol Sister       Social History     Tobacco Use    Smoking status: Former     Packs/day: 1.00     Years: 25.00     Pack years: 25.00     Types: Cigarettes     Quit date: 1999     Years since quittin.3    Smokeless tobacco: Never   Vaping Use    Vaping Use: Never used   Substance Use Topics    Alcohol use:  Yes     Alcohol/week: 14.0 standard drinks     Types: 14 Cans of beer per week     Comment: 2 qhs    Drug use: Never      Outpatient Encounter Medications as of 2022 Medication Sig Dispense Refill    predniSONE (DELTASONE) 10 MG tablet 3 tabs qam  x 3 days, 2 tabs qam x 3 days, 1 tab qam x 3 days 18 tablet 0    methocarbamol (ROBAXIN-750) 750 MG tablet Take 1 tablet by mouth 4 times daily for 10 days 40 tablet 0    tamsulosin (FLOMAX) 0.4 MG capsule Take 0.4 mg by mouth at bedtime      [DISCONTINUED] rosuvastatin (CRESTOR) 20 MG tablet Take 1 tablet by mouth daily Take 1 tablet by mouth daily 30 tablet 1    Multiple Vitamin (MULTIVITAMIN) tablet Take 1 tablet by mouth every other day      gemfibrozil (LOPID) 600 MG tablet Take 600 mg by mouth 2 times daily (before meals)      apixaban (ELIQUIS STARTER PACK) 5 MG TABS tablet Take 10 mg (2 tablets) orally twice daily for 7 days, then take 5 mg (1 tablet) orally twice daily thereafter. (1st dose given in ED) 72 tablet 0     No facility-administered encounter medications on file as of 9/1/2022. Physical Exam  Vitals reviewed. Constitutional:       Appearance: Normal appearance. HENT:      Head: Normocephalic. Right Ear: Hearing normal.      Left Ear: Hearing normal.   Eyes:      General: Lids are normal. Vision grossly intact. Musculoskeletal:      Right shoulder: Normal.        Arms:    Neurological:      Mental Status: He is alert. Psychiatric:         Behavior: Behavior is cooperative. On this date 9/1/2022 I have spent 15 minutes reviewing previous notes, test results and face to face with the patient discussing the diagnosis and importance of compliance with the treatment plan as well as documenting on the day of the visit. An electronic signature was used to authenticate this note.     --oMrgan Fierro, APRN - CNP

## 2022-09-01 NOTE — PATIENT INSTRUCTIONS
Take prednisone as prescribed  Take Robaxin as prescribed  Continue Tylenol as needed  Have Cervical imaging

## 2022-09-03 ASSESSMENT — ENCOUNTER SYMPTOMS
ABDOMINAL PAIN: 0
CHEST TIGHTNESS: 0
DIARRHEA: 0
SHORTNESS OF BREATH: 0
CONSTIPATION: 0
BACK PAIN: 1

## 2022-09-03 ASSESSMENT — VISUAL ACUITY: OU: 1

## 2022-09-04 ENCOUNTER — HOSPITAL ENCOUNTER (OUTPATIENT)
Dept: GENERAL RADIOLOGY | Age: 67
Discharge: HOME | End: 2022-09-04
Payer: MEDICARE

## 2022-09-04 ENCOUNTER — HOSPITAL ENCOUNTER (OUTPATIENT)
Age: 67
Discharge: HOME | End: 2022-09-04
Payer: MEDICARE

## 2022-09-04 DIAGNOSIS — R20.0 RIGHT ARM NUMBNESS: ICD-10-CM

## 2022-09-04 PROCEDURE — 72040 X-RAY EXAM NECK SPINE 2-3 VW: CPT

## 2022-09-06 ENCOUNTER — TELEPHONE (OUTPATIENT)
Dept: PRIMARY CARE CLINIC | Age: 67
End: 2022-09-06

## 2022-09-06 DIAGNOSIS — R20.0 RIGHT ARM NUMBNESS: Primary | ICD-10-CM

## 2022-09-06 DIAGNOSIS — M25.511 ACUTE PAIN OF RIGHT SHOULDER: Primary | ICD-10-CM

## 2022-09-06 RX ORDER — OXYCODONE HYDROCHLORIDE AND ACETAMINOPHEN 5; 325 MG/1; MG/1
1 TABLET ORAL EVERY 6 HOURS PRN
Qty: 28 TABLET | Refills: 0 | Status: SHIPPED | OUTPATIENT
Start: 2022-09-06 | End: 2022-09-13

## 2022-09-06 NOTE — TELEPHONE ENCOUNTER
I agree with current therapy. Go to the orthopedic surgeon (Samir Peñaloza) that was suggested by [facility]. Finish the oral steroid. Percocet for pain until you are seen by orthopedics, has been sent to the pharmacy.

## 2022-09-07 SDOH — HEALTH STABILITY: PHYSICAL HEALTH: ON AVERAGE, HOW MANY DAYS PER WEEK DO YOU ENGAGE IN MODERATE TO STRENUOUS EXERCISE (LIKE A BRISK WALK)?: 5 DAYS

## 2022-09-07 SDOH — HEALTH STABILITY: PHYSICAL HEALTH: ON AVERAGE, HOW MANY MINUTES DO YOU ENGAGE IN EXERCISE AT THIS LEVEL?: 30 MIN

## 2022-09-08 ENCOUNTER — OFFICE VISIT (OUTPATIENT)
Dept: ORTHOPEDIC SURGERY | Age: 67
End: 2022-09-08
Payer: MEDICARE

## 2022-09-08 ENCOUNTER — TELEPHONE (OUTPATIENT)
Dept: ORTHOPEDIC SURGERY | Age: 67
End: 2022-09-08

## 2022-09-08 VITALS — BODY MASS INDEX: 23.25 KG/M2 | WEIGHT: 157 LBS | HEIGHT: 69 IN

## 2022-09-08 DIAGNOSIS — M50.30 DDD (DEGENERATIVE DISC DISEASE), CERVICAL: ICD-10-CM

## 2022-09-08 DIAGNOSIS — M48.02 CERVICAL STENOSIS OF SPINE: Primary | ICD-10-CM

## 2022-09-08 PROCEDURE — 99213 OFFICE O/P EST LOW 20 MIN: CPT | Performed by: ORTHOPAEDIC SURGERY

## 2022-09-08 NOTE — TELEPHONE ENCOUNTER
Rosa Hallman MA  [facility] Clinical Support    MRI CERVICAL SPINE approved auth# [id number]     I left a detailed VM for pt letting him know he may call [phone] to schedule his MRI, and once scheduled to call our office back to make a fu appt with Dr Heather De Leon to go over results.

## 2022-09-08 NOTE — PROGRESS NOTES
Keyur November  [id number]  September 8, 2022    Chief Complaint   Patient presents with    Arm Pain     Right       History: The patient is a 39-year-old gentleman who is here for evaluation of his right arm. He has had right arm numbness and tingling for the past 10 days. It appears to radiate from the posterior right shoulder/trapezius down into his elbow and extending into his right thumb. He is finishing up a oral course of prednisone which seems to be helping. He has also been taking Robaxin and oxycodone intermittently. He did try acupuncture on 2 separate occasions. He is left-hand dominant. He has no prior history of issues with his right arm. He cannot take anti-inflammatories. This is a consult from Alyse Koehler CNP for right arm pain. The patient's  past medical history, medications, allergies,  family history, social history, and have been reviewed, and dated and are recorded in the chart. Pertinent items are noted in HPI. Review of systems reviewed from Pertinent History Form dated on 9/8 and available in the patient's chart under the Media tab. Vitals:  Ht 5' 9\" (1.753 m)   Wt 157 lb (71.2 kg)   BMI 23.18 kg/m²     Physical: On examination today, the patient is alert and oriented x3. He does have limited range of motion to the cervical spine. He has diffuse pericervical muscle tightness and tenderness. Spurling sign off to the right is positive. He has full range of motion of both shoulders. There is no evidence of atrophy. He does have weakness with right elbow flexion. There is slight weakness with right wrist extension. Examination of the skin reveals no lesions or ulcerations. He does have some slight subjective decreased sensation in the thumb on the right side. X-rays: 3 views of the cervical spine obtained previously were extensively reviewed. There is evidence of reversal of the normal cervical lordosis. He does have diffuse degenerative disc disease. The changes are most severe at the C5-C6 level and the C6-7 level. There is also evidence of facet arthropathy. Impression: #1 cervical degenerative disc disease #2 cervical spondylosis with stenosis    Plan: At this time, we will obtain an MRI scan of the cervical spine. He will finish the oral steroids. He may continue taking the Robaxin. We will send the patient to therapy to include cervical traction. He will modify his activities. He will follow-up with me after the MRI scan is completed. If he continues to have issues, we will send the patient to Dr. Joe Elliott. No orders of the defined types were placed in this encounter.

## 2022-09-15 ENCOUNTER — HOSPITAL ENCOUNTER (OUTPATIENT)
Dept: PHYSICAL THERAPY | Age: 67
Setting detail: THERAPIES SERIES
Discharge: HOME | End: 2022-09-15
Payer: MEDICARE

## 2022-09-15 PROCEDURE — 97140 MANUAL THERAPY 1/> REGIONS: CPT

## 2022-09-15 PROCEDURE — 97530 THERAPEUTIC ACTIVITIES: CPT

## 2022-09-15 PROCEDURE — 97161 PT EVAL LOW COMPLEX 20 MIN: CPT

## 2022-09-15 NOTE — FLOWSHEET NOTE
[address] [facility] [place] [place] [address]  Phone: [phone]   Fax:     [phone]    Physical Therapy Treatment Note/ Progress Report:     Date:  09/15/2022    Patient Name:  Rome Ortiz    :  1955  MRN: [id number]    Pertinent Medical History:      Medical/Treatment Diagnosis Information:  Medical Diagnosis: Cervical stenosis of spine [M48.02]  Treatment Diagnosis: cervical hypomobility , reduced tolerance to use of RUE , signs consisant with right cervical radiculopathy    Insurance/Certification information:  PT Insurance Information: [id number] [employer]. MED[employer]ARE  Physician Information:  Barbara Smith MD  Plan of care signed (Y/N): routed 9/15    Date of Patient follow up with Physician:      Progress Report: []  Yes  [x]  No     Date Range for reporting period:  Beginnin/15/2022  Ending:     Progress report due (10 Rx/or 30 days whichever is less):      Recertification due (POC duration/ or 90 days whichever is less):      Visit # Insurance/POC Allowable Auth Needed    [employer] / NO DED / $40 COPAY / [place] / []Yes    [x]No     Functional Outcomes Measure:    Date Assessed: at eval=55  Test: FOTO  Score:    Pain level:  6/10     HISTORY OF INJURY:Patient stated complaint: Insidious onset of right arm pain about 3 weeks ago after waking, denies injury and previous h/o, works full time as an  in a [employer] firm, activities include music  play [employer] , c/o constant pain right shoulder blade pain that radiates to elbow/ forearm, numbness to thumb and 2nd/ 3rd digits , decreased pain post oral steroids and acupuncture,  Decreased sx's w/ heat lying on left side, avoid lifting,  Increased sx's w/ lifting, using mouse at RUE  X ray from 22   FINDINGS:   There is no fracture, subluxation or osseous destruction.   Mild disc space   narrowing and endplate osteophyte formation is present at the C5/6 and C6/7   levels. Prevertebral soft tissues are within normal limits. The lung apices   are clear. SUBJECTIVE:  See eval    OBJECTIVE:   Observation:   Test measurements:      RESTRICTIONS/PRECAUTIONS:     Exercises/Interventions:   Therapeutic Ex (61994)  Min: Resistance/Repetitions Notes                                      HEP Added 9/15    Manual Intervention (31085)  Min:     Cerv mobs/manip OA, AA flexion gr 3 C3-4 R rotation gr 3     Thoracic mobs/manip     CT manip     Rib mobilizations     STM Bilat. Lateral and dorsal Cerv. muscles     MET  C3 left rotated in flexion                    NMR re-education (13544)  Min:               Therapeutic Activity (87630)  Min:     Neutral spine 9/15 seated at computer and standing at computer , discussed and demonstrated to pt. Pt assumed NS and tolerated well                   Modalities  Min:                  Other Therapeutic Activities:  9/15/22 Pt was educated on PT POC, Diagnosis, Prognosis, pathomechanics relative to cervical radiculopathy  as well as frequency and duration of scheduling future physical therapy appointments. Time was also taken on this day to answer all patient questions and participation in PT. Reviewed appointment policy in detail with patient and patient verbalized understanding. Home Exercise Program:  HEP instruction:   Access Code: [email or web address]  URL: [email or web address]  Date: 09/15/2022  Prepared by: Radha Push     Exercises  Seated Cervical Retraction - 3-5 x daily - 7 x weekly - 2-3 sets - 10 reps - 2-3 hold  The patient demonstrated good tolerance to and understanding of the HEP. Written instructions have been issued.      Therapeutic Exercise and NMR EXR  [] (59897) Provided verbal/tactile cueing for activities related to strengthening, flexibility, endurance, ROM  for improvements in cervical, postural, scapular, scapulothoracic and UE control with self care, reaching, carrying, lifting, house/yardwork, driving/computer work.    [] (18635) Provided verbal/tactile cueing for activities related to improving balance, coordination, kinesthetic sense, posture, motor skill, proprioception  to assist with cervical, scapular, scapulothoracic and UE control with self care, reaching, carrying, lifting, house/yardwork, driving/computer work. Therapeutic Activities:    [] (61887 or 82896) Provided verbal/tactile cueing for activities related to improving balance, coordination, kinesthetic sense, posture, motor skill, proprioception and motor activation to allow for proper function of cervical, scapular, scapulothoracic and UE control with self care, carrying, lifting, driving/computer work.      Home Exercise Program:    [] (22859) Reviewed/Progressed HEP activities related to strengthening, flexibility, endurance, ROM of cervical, scapular, scapulothoracic and UE control with self care, reaching, carrying, lifting, house/yardwork, driving/computer work  [] (48804) Reviewed/Progressed HEP activities related to improving balance, coordination, kinesthetic sense, posture, motor skill, proprioception of cervical, scapular, scapulothoracic and UE control with self care, reaching, carrying, lifting, house/yardwork, driving/computer work      Manual Treatments:  PROM / STM / Oscillations-Mobs:  G-I, II, III, IV (PA's, Inf., Post.)  [] (84293) Provided manual therapy to mobilize soft tissue/joints of cervical/CT, scapular GHJ and UE for the purpose of decreasing headache, modulating pain, promoting relaxation,  increasing ROM, reducing/eliminating soft tissue swelling/inflammation/restriction, improving soft tissue extensibility and allowing for proper ROM for normal function with self care, reaching, carrying, lifting, house/yardwork, driving/computer work    If [facility] Please Indicate Time In/Out  CPT Code Time in Time out                                   Approval Dates:  CPT Code Units Approved Units Used  Date Updated:                     Charges:  Timed Code Treatment Minutes: 30   Total Treatment Minutes: 50     [x] EVAL (LOW) 49742 (typically 20 minutes face-to-face)  [] EVAL (MOD) 20010 (typically 30 minutes face-to-face)  [] EVAL (HIGH) 50214 (typically 45 minutes face-to-face)  [] RE-EVAL     [] SB(88394) x     [] Dry needle 1 or 2 Muscles (60289)  [] NMR (36532) x     [] Dry needle 3+ Muscles (91002)  [x] Manual (80792) x     [] Ultrasound (10131) x  [x] TA (47435) x     [] Mech Traction (16089)  [] ES(attended) (01445)     [] ES (un) (58264):   [] Vasopump (48823) [] Ionto (48218)   [] Other:    GOALS:  Patient stated goal: relieve numbness and pain   [] Progressing: [] Met: [] Not Met: [] Adjusted     Therapist goals for Patient:   Short Term Goals: To be achieved in: 2 weeks  1. Independent in HEP and progression per patient tolerance, in order to prevent re-injury. [] Progressing: [] Met: [] Not Met: [] Adjusted  2. Patient will have a decrease in pain 0-4/10 to facilitate improvement in movement, function, and ADLs as indicated by Functional Deficits. [] Progressing: [] Met: [] Not Met: [] Adjusted     Long Term Goals: To be achieved in: 4-6 weeks  1. Increase FOTO functional outcome score from 55 to 71  to assist with reaching prior level of function. [] Progressing: [] Met: [] Not Met: [] Adjusted  2. Patient will demonstrate increased AROM to [facility] of cervical/thoracic spine to allow for proper joint functioning as indicated by patients Functional Deficits. [] Progressing: [] Met: [] Not Met: [] Adjusted  3. Patient will demonstrate an increase in postural awareness and control and activation of  Deep cervical stabilizers to allow for proper functional mobility as indicated by patients Functional Deficits. [] Progressing: [] Met: [] Not Met: [] Adjusted  4. Patient will return to work related and playing music  functional activities without increased symptoms or restriction.    [] Progressing: [] Met: [] Not Met: [] Adjusted  5. Patient will have a decrease in pain 0-2/10 to facilitate improvement in movement, function, and ADLs as indicated by Functional Deficits. [] Progressing: [] Met: [] Not Met: [] Adjusted     ASSESSMENT:  See eval    Treatment/Activity Tolerance:  [x] Patient tolerated treatment well [] Patient limited by fatique  [] Patient limited by pain  [] Patient limited by other medical complications  [] Other:     Overall Progression Towards Functional goals/ Treatment Progress Update:  [] Patient is progressing as expected towards functional goals listed. [] Progression is slowed due to complexities/Impairments listed. [] Progression has been slowed due to co-morbidities. [x] Plan just implemented, too soon to assess goals progression <30days   [] Goals require adjustment due to lack of progress  [] Patient is not progressing as expected and requires additional follow up with physician  [] Other    Prognosis for POC: [x] Good [] Fair  [] Poor    Patient requires continued skilled intervention: [x] Yes  [] No        PLAN: Cont. As above , add nerve gliding, build postural muscle strength and awareness , centralize symptoms   [] Continue per plan of care [] Alter current plan (see comments)  [x] Plan of care initiated [] Hold pending MD visit [] Discharge    Electronically signed by: Ifrah Navarro PT    Note: If patient does not return for scheduled/recommended follow up visits, this note will serve as a discharge from care along with the most recent update on progress.

## 2022-09-15 NOTE — PLAN OF CARE
[facility] - [facility] Therapy  [address] [facility] ([facility] [place]. [place] [address]  Phone: [phone]   Fax:     [phone]      Physical Therapy Certification    Dear Jesus Tejada MD,    We had the pleasure of evaluating the following patient for physical therapy services at [facility]. A summary of our findings can be found in the initial assessment below. This includes our plan of care. If you have any questions or concerns regarding these findings, please do not hesitate to contact me at the office phone number checked above.   Thank you for the referral.       Physician Signature:_______________________________Date:__________________  By signing above (or electronic signature), therapists plan is approved by physician      Patient: Grabiel Green   : 1955   MRN: [id number]  Referring Physician: Jesus Tejada MD      Evaluation Date: 09/15/2022      Medical Diagnosis Information:  Medical Diagnosis: Cervical stenosis of spine [M48.02]   Treatment Diagnosis: cervical hypomobility , reduced tolerance to use of RUE , signs consisant with right cervical radiculopathy                                    Insurance information: PT Insurance Information: [employer] MEDICARE    Precautions/ Contra-indications:   Latex Allergy:  [x]NO      []YES  Preferred Language for Healthcare:   [x]English       []other:    C-SSRS Triggered by Intake questionnaire (Past 2 wk assessment ):   [x] No, Questionnaire did not trigger screening.   [] Yes, Patient intake triggered C-SSRS Screening      [] C-SSRS Screening completed  [] PCP notified via Epic     SUBJECTIVE: Patient stated complaint: Insidious onset of right arm pain about 3 weeks ago after waking, denies injury and previous h/o, works full time as an  in a [employer] firm, activities include music  play Saxophone , c/o constant pain right shoulder blade pain that radiates to elbow/ forearm, numbness to thumb and 2nd/ 3rd digits , decreased pain post oral steroids and acupuncture,  Decreased sx's w/ heat lying on left side, avoid lifting,  Increased sx's w/ lifting, using mouse at RUE  X ray from 9/4/22   FINDINGS:   There is no fracture, subluxation or osseous destruction. Mild disc space   narrowing and endplate osteophyte formation is present at the C5/6 and C6/7   levels. Prevertebral soft tissues are within normal limits. The lung apices   are clear. Relevant Medical History:   Functional Outcomes Measure: FOTO  = 55    Pain Scale: 4-6/10      Living Status/Prior Level of Function: Independent with ADLs and IADLs,     OBJECTIVE:   Posture: forward head posture , increased thoracic kyphosis     Functional Mobility/Transfers: independent     Palpation: ttp right Brachioradialis and ECRL    Bandages/Dressings/Incisions: NA    Gait: (include devices/WB status):  WNL     CERV ROM     Cervical Flexion 42*    Cervical Extension 38*     Left Right   Cervical SB 30 30*   Cervical rotation 62 52*   Quadrant     Dorsal Glide      UE ROM Left Right   Shoulder Flex     Shoulder Abd     Shoulder ER     Shoulder IR     Elbow flex/ext     Wrist flex/ext/pro/sup     Finger flex/ext/opposition     Shoulder AROM WNL w OP     UE Strength  Left Right   Shoulder Flex 5/5 5/5   Shoulder Scap     Shoulder ABd (C5 Axillary) 5/5 5/5   Shoulder ER      Shoulder IR     Elbow Flex (C5 Musc) 5/5 5/5   Elbow Ext (C7 Radial) 5/5 5/5   Wrist Flex (C6 Radial) 5/5 5/5   Wrist Ext (C7 Radial) 5/5 5/5   Finger flex (C8 median)     Finger ext (C7 Radial-PIN) 5/5 5/5   APB (T1 Median) 5/5 5/5   Finger Abd (T1 Ulnar)     UE myotomes WNL        Reflexes Normal Abnormal Comments               S1-2 Seated achilles [] []    S1-2 Prone knee bend [] []    L3-4 Patellar tendon [] []    C5-6 Biceps [x] []    C6 Brachioradialis [x] []    C7-8 Triceps [x] []      Reflexes/Sensation: [x]Dermatomes/Myotomes intact    [x]Reflexes equal and normal bilaterally   []Other:    Joint mobility:    []Normal    [x]Hypo upper cerv. []Hyper    Neurodynamics:     Orthopedic Special Tests:      Cluster Testing  Normal Abnormal N/A Comments   Babinski Test [] [] []    Mobley Test [x] [] []    Inverted Sup Sign [] [] []    Alar Ligament Test [] [] []    Transverse Ligament Test [] [] []    Sharp-Cruzito Test [] [] []    Hautards Test [] [] []    Vertebral Artery Test [] [] []             Neural dynamic/ Tension testing Normal Abnormal N/A Comments   Spurling Maneuver:  [x] [] []    Distraction testing: [x] [] []    ULNT [] [] []    Shoulder Abd testing  [] [] []    Cerv Rot/Lat Flex- 1st Rib [] [] []    Deep Neck Flex/endurance testing [] [] []    Craniocerv Flex testing Maty Ramirez [] [] []    End Range Tolerance testing. [] [] []     [] [] []                           [x] Patient history, allergies, meds reviewed. Medical chart reviewed. See intake form. Review Of Systems (ROS):  [x]Performed Review of systems (Integumentary, CardioPulmonary, Neurological) by intake and observation. Intake form has been scanned into medical record. Patient has been instructed to contact their primary care physician regarding ROS issues if not already being addressed at this time.       Co-morbidities/Complexities (which will affect course of rehabilitation):   []None           Arthritic conditions   []Rheumatoid arthritis (M05.9)  []Osteoarthritis (M19.91)   Cardiovascular conditions   []Hypertension (I10)  [x]Hyperlipidemia (E78.5)  []Angina pectoris (I20)  []Atherosclerosis (I70)  []CVA Musculoskeletal conditions   []Disc pathology   []Congenital spine pathologies   []Prior surgical intervention  []Osteoporosis (M81.8)  []Osteopenia (M85.8)   Endocrine conditions   []Hypothyroid (E03.9)  []Hyperthyroid Gastrointestinal conditions   []Constipation (T15.68)   Metabolic conditions   []Morbid obesity (E66.01)  []Diabetes type 1(E10.65) or 2 (E11.65)   []Neuropathy (G60.9)     Pulmonary conditions   []Asthma (J45)  []Coughing   []COPD (J44.9)   Psychological Disorders  []Anxiety (F41.9)  []Depression (F32.9)   []Other:   [x]Other:   DVT         Barriers to/and or personal factors that will affect rehab potential:              [x]Age  []Sex   []Smoker              []Motivation/Lack of Motivation                        []Co-Morbidities              []Cognitive Function, education/learning barriers              []Environmental, home barriers              [x]profession/work barriers  []past PT/medical experience  []other:  Justification:     Falls Risk Assessment (30 days):   [x] Falls Risk assessed and no intervention required.   [] Falls Risk assessed and Patient requires intervention due to being higher risk   TUG score (>12s at risk):     [] Falls education provided, including     ASSESSMENT: Skilled PT services are required to address the following impairments     Functional Impairments:     [x]Noted cervical/thoracic/GHJ joint hypomobility   []Noted cervical/thoracic/GHJ joint hypermobility   []Decreased cervical/UE functional ROM   []Noted Headache pain aggravated by neck movements with/without dizziness   [x]Abnormal reflexes/sensation/myotomal/dermatomal deficits   []Decreased DCF control or ability to hold head up   []Decreased RC/scapular/core strength and neuromuscular control    []Decreased UE functional strength   []other:      Functional Activity Limitations (from functional questionnaire and intake)   [x]Reduced ability to tolerate prolonged functional positions   []Reduced ability or difficulty with changes of positions or transfers between positions   []Reduced ability to maintain good posture and demonstrate good body mechanics with sitting, bending, and lifting   [] Reduced ability or tolerance with driving and/or computer work   [x]Reduced ability to perform lifting, reaching, carrying tasks   []Reduced ability to concentrate   []Reduced ability to sleep    []Reduced ability to tolerate any impact through UE or spine   []Reduced ability to ambulate prolonged functional periods/distances   []other:    Participation Restrictions   []Reduced participation in self care activities   [x]Reduced participation in home management activities   [x]Reduced participation in work activities   []Reduced participation in social activities. [x]Reduced participation in sport/recreational activities. Classification/Subgrouping:   [x]signs/symptoms consistent with neck pain with mobility deficits     []signs/symptoms consistent with neck pain with movement coordinated impairments    [x]signs/symptoms consistent with neck pain with radiating pain    []signs/symptoms consistent with neck pain with headaches (cervicogenic)    [x]Signs/symptoms consistent with nerve root involvement including myotome & dermatome dysfunction   []sign/symptoms consistent with facet dysfunction of cervical and thoracic spine    []signs/symptoms consistent suggesting central cord compression/UMN syndromes   []signs/symptoms consistent with discogenic cervical pain   []signs/symptoms consistent with rib dysfunction   []signs/symptoms consistent with postural dysfunction   []signs/symptoms consistent with shoulder pathology    []signs/symptoms consistent with post-surgical status including decreased ROM, strength and function.    [x]signs/symptoms consistent with pathology which may benefit from Dry Needling   []signs/symptoms which may limit the use of advanced manual therapy techniques: (Elevated CV risk profile, recent trauma, intolerance to end range positions, prior TIA, visual issues, UE neurological compromise )     Prognosis/Rehab Potential:      []Excellent   [x]Good    []Fair   []Poor    Tolerance of evaluation/treatment:    []Excellent   [x]Good    []Fair   []Poor    Physical Therapy Evaluation Complexity Justification  [x] A history of present problem with:  [] no personal factors and/or comorbidities that impact the plan of care;  [x]1-2 personal factors and/or comorbidities that impact the plan of care  []3 personal factors and/or comorbidities that impact the plan of care  [x] An examination of body systems using standardized tests and measures addressing any of the following: body structures and functions (impairments), activity limitations, and/or participation restrictions;:  [x] a total of 1-2 or more elements   [] a total of 3 or more elements   [] a total of 4 or more elements   [x] A clinical presentation with:  [x] stable and/or uncomplicated characteristics   [] evolving clinical presentation with changing characteristics  [] unstable and unpredictable characteristics;   [x] Clinical decision making of [x] low, [] moderate, [] high complexity using standardized patient assessment instrument and/or measurable assessment of functional outcome. [x] EVAL (LOW) 90116 (typically 20 minutes face-to-face)  [] EVAL (MOD) 57433 (typically 30 minutes face-to-face)  [] EVAL (HIGH) 10272 (typically 45 minutes face-to-face)  [] RE-EVAL     PLAN:   Frequency/Duration:  2 days per week for 6 Weeks:  Interventions:  [x]  Therapeutic exercise including: strength training, ROM, for cervical spine,scapula, core and Upper extremity, including postural re-education. [x]  NMR activation and proprioception for Deep cervical flexors, periscapular and RC muscles and Core, including postural re-education. [x]  Manual therapy as indicated for C/T spine, ribs, Soft tissue to include: Dry Needling/IASTM, STM, PROM, Gr I-IV mobilizations, manipulation. [x] Modalities as needed that may include: thermal agents, E-stim, Biofeedback, US, iontophoresis as indicated  [x] Patient education on joint protection, postural re-education, activity modification, progression of HEP. HEP instruction:   Access Code: [email or web address]  URL: [email or web address]  Date: visits, this note will serve as a discharge from care along with the most recent update on progress.

## 2022-09-20 ENCOUNTER — HOSPITAL ENCOUNTER (OUTPATIENT)
Dept: PHYSICAL THERAPY | Age: 67
Setting detail: THERAPIES SERIES
Discharge: HOME | End: 2022-09-20
Payer: MEDICARE

## 2022-09-20 PROCEDURE — 97140 MANUAL THERAPY 1/> REGIONS: CPT

## 2022-09-20 PROCEDURE — 97110 THERAPEUTIC EXERCISES: CPT

## 2022-09-20 NOTE — FLOWSHEET NOTE
[address] [facility] [address] [place] [address]  Phone: [phone]   Fax:     [phone]    Physical Therapy Treatment Note/ Progress Report:     Date:  2022    Patient Name:  Deepak Hernandez    :  1955  MRN: [id number]    Pertinent Medical History:      Medical/Treatment Diagnosis Information:  Medical Diagnosis: Cervical stenosis of spine [M48.02]  Treatment Diagnosis: cervical hypomobility , reduced tolerance to use of RUE , signs consisant with right cervical radiculopathy    Insurance/Certification information:  PT Insurance Information: [id number]. MEDICARE  Physician Information:  Yuri Nolan MD  Plan of care signed (Y/N): routed 9/15    Date of Patient follow up with Physician:      Progress Report: []  Yes  [x]  No     Date Range for reporting period:  Beginnin2022  Ending:     Progress report due (10 Rx/or 30 days whichever is less):      Recertification due (POC duration/ or 90 days whichever is less):      Visit # Insurance/POC Allowable Auth Needed    Angelique Lunger / NO DED / $40 COPAY / Floridalma Stands / []Yes    [x]No     Functional Outcomes Measure:    Date Assessed: at eval=55  Test: FOTO  Score:    Pain level:  6/10     HISTORY OF INJURY:Patient stated complaint: Insidious onset of right arm pain about 3 weeks ago after waking, denies injury and previous h/o, works full time as an  in a [employer] firm, activities include music  play [employer] , c/o constant pain right shoulder blade pain that radiates to elbow/ forearm, numbness to thumb and 2nd/ 3rd digits , decreased pain post oral steroids and acupuncture,  Decreased sx's w/ heat lying on left side, avoid lifting,  Increased sx's w/ lifting, using mouse at RUE  X ray from 22   FINDINGS:   There is no fracture, subluxation or osseous destruction.   Mild disc space   narrowing and endplate osteophyte formation is present at the C5/6 and C6/7   levels. Prevertebral soft tissues are within normal limits. The lung apices   are clear. SUBJECTIVE:  09/20/22: Patient reports numbness still about the same in the thumb and 2nd and 3rd digits. States some soreness in right shoulder blade area after playing in a festival over the weekend. OBJECTIVE:   Observation:   Test measurements:      RESTRICTIONS/PRECAUTIONS:     Exercises/Interventions:   Therapeutic Ex (84030)  Min: 10 Resistance/Repetitions Notes   Supine chin tuck 5 sec x 10    Scap retraction 5 sec x 10    UT stretch 3 x 20 sec                         HEP Added 9/15    Manual Intervention (44859)  Min: 30     Cerv mobs/manip OA, AA flexion gr 3 C3-4 R rotation gr 3,sideglides,man cerv traction    Thoracic mobs/manip     CT manip     Rib mobilizations     STM/IASTM Bilat. Lateral and dorsal Cerv. muscles     MET  C3 left rotated in flexion                    NMR re-education (51305)  Min:               Therapeutic Activity (53767)  Min:     Neutral spine 9/15 seated at computer and standing at computer , discussed and demonstrated to pt. Pt assumed NS and tolerated well                   Modalities  Min:                  Other Therapeutic Activities:  9/15/22 Pt was educated on PT POC, Diagnosis, Prognosis, pathomechanics relative to cervical radiculopathy  as well as frequency and duration of scheduling future physical therapy appointments. Time was also taken on this day to answer all patient questions and participation in PT. Reviewed appointment policy in detail with patient and patient verbalized understanding. Home Exercise Program:  HEP instruction:   Access Code: [email or web address]  URL: [email or web address]  Date: 09/15/2022  Prepared by: Emily Urias     Exercises  Seated Cervical Retraction - 3-5 x daily - 7 x weekly - 2-3 sets - 10 reps - 2-3 hold  The patient demonstrated good tolerance to and understanding of the HEP.  Written instructions have been issued. Therapeutic Exercise and NMR EXR  [] (19103) Provided verbal/tactile cueing for activities related to strengthening, flexibility, endurance, ROM  for improvements in cervical, postural, scapular, scapulothoracic and UE control with self care, reaching, carrying, lifting, house/yardwork, driving/computer work.    [] (81197) Provided verbal/tactile cueing for activities related to improving balance, coordination, kinesthetic sense, posture, motor skill, proprioception  to assist with cervical, scapular, scapulothoracic and UE control with self care, reaching, carrying, lifting, house/yardwork, driving/computer work. Therapeutic Activities:    [] (67019 or 97902) Provided verbal/tactile cueing for activities related to improving balance, coordination, kinesthetic sense, posture, motor skill, proprioception and motor activation to allow for proper function of cervical, scapular, scapulothoracic and UE control with self care, carrying, lifting, driving/computer work.      Home Exercise Program:    [] (72510) Reviewed/Progressed HEP activities related to strengthening, flexibility, endurance, ROM of cervical, scapular, scapulothoracic and UE control with self care, reaching, carrying, lifting, house/yardwork, driving/computer work  [] (93930) Reviewed/Progressed HEP activities related to improving balance, coordination, kinesthetic sense, posture, motor skill, proprioception of cervical, scapular, scapulothoracic and UE control with self care, reaching, carrying, lifting, house/yardwork, driving/computer work      Manual Treatments:  PROM / STM / Oscillations-Mobs:  G-I, II, III, IV (PA's, Inf., Post.)  [] (95396) Provided manual therapy to mobilize soft tissue/joints of cervical/CT, scapular GHJ and UE for the purpose of decreasing headache, modulating pain, promoting relaxation,  increasing ROM, reducing/eliminating soft tissue swelling/inflammation/restriction, improving soft tissue extensibility and allowing for proper ROM for normal function with self care, reaching, carrying, lifting, house/yardwork, driving/computer work    If [facility] Please Indicate Time In/Out  CPT Code Time in Time out                                   Approval Dates:  CPT Code Units Approved Units Used  Date Updated:                     Charges:  Timed Code Treatment Minutes: 40   Total Treatment Minutes: 40     [] EVAL (LOW) 82895 (typically 20 minutes face-to-face)  [] EVAL (MOD) 95700 (typically 30 minutes face-to-face)  [] EVAL (HIGH) 81715 (typically 45 minutes face-to-face)  [] RE-EVAL     [x] CV(61142) x     [] Dry needle 1 or 2 Muscles (38840)  [] NMR (73726) x     [] Dry needle 3+ Muscles (34403)  [x] Manual (47649) x  2   [] Ultrasound (30687) x  [] TA (48133) x     [] Mech Traction (03084)  [] ES(attended) (33801)     [] ES (un) (48586):   [] Vasopump (10254) [] Ionto (29245)   [] Other:    GOALS:  Patient stated goal: relieve numbness and pain   [] Progressing: [] Met: [] Not Met: [] Adjusted     Therapist goals for Patient:   Short Term Goals: To be achieved in: 2 weeks  1. Independent in HEP and progression per patient tolerance, in order to prevent re-injury. [] Progressing: [] Met: [] Not Met: [] Adjusted  2. Patient will have a decrease in pain 0-4/10 to facilitate improvement in movement, function, and ADLs as indicated by Functional Deficits. [] Progressing: [] Met: [] Not Met: [] Adjusted     Long Term Goals: To be achieved in: 4-6 weeks  1. Increase FOTO functional outcome score from 55 to 71  to assist with reaching prior level of function. [] Progressing: [] Met: [] Not Met: [] Adjusted  2. Patient will demonstrate increased AROM to [facility] of cervical/thoracic spine to allow for proper joint functioning as indicated by patients Functional Deficits. [] Progressing: [] Met: [] Not Met: [] Adjusted  3.  Patient will demonstrate an increase in postural awareness and control and activation of  Deep progress.

## 2022-09-22 ENCOUNTER — HOSPITAL ENCOUNTER (OUTPATIENT)
Dept: PHYSICAL THERAPY | Age: 67
Setting detail: THERAPIES SERIES
Discharge: HOME | End: 2022-09-22
Payer: MEDICARE

## 2022-09-22 PROCEDURE — 97012 MECHANICAL TRACTION THERAPY: CPT

## 2022-09-22 PROCEDURE — 97140 MANUAL THERAPY 1/> REGIONS: CPT

## 2022-09-22 PROCEDURE — 97110 THERAPEUTIC EXERCISES: CPT

## 2022-09-22 NOTE — FLOWSHEET NOTE
[address] [facility] [place]  Phone: [phone]   Fax:     [phone]    Physical Therapy Treatment Note/ Progress Report:     Date:  2022    Patient Name:  Ene Faye    :  1955  MRN: [id number]    Pertinent Medical History:      Medical/Treatment Diagnosis Information:  Medical Diagnosis: Cervical stenosis of spine [M48.02]  Treatment Diagnosis: cervical hypomobility , reduced tolerance to use of RUE , signs consisant with right cervical radiculopathy    Insurance/Certification information:  PT Insurance Information: [id number] [email or web address]. MEDICARE  Physician Information:  Mikey Alvarez MD  Plan of care signed (Y/N): routed 9/15    Date of Patient follow up with Physician:      Progress Report: []  Yes  [x]  No     Date Range for reporting period:  Beginnin2022  Ending:     Progress report due (10 Rx/or 30 days whichever is less): 68/10     Recertification due (POC duration/ or 90 days whichever is less):      Visit # Insurance/POC Allowable Auth Needed   3 /12 Corie Wolf / NO DED / $40 COPAY / Chares Pott / []Yes    [x]No     Functional Outcomes Measure:    Date Assessed: at eval=55  Test: FOTO  Score:    Pain level:  3-6/10     HISTORY OF INJURY:Patient stated complaint: Insidious onset of right arm pain about 3 weeks ago after waking, denies injury and previous h/o, works full time as an  in a [employer] firm, activities include music  play [employer] , c/o constant pain right shoulder blade pain that radiates to elbow/ forearm, numbness to thumb and 2nd/ 3rd digits , decreased pain post oral steroids and acupuncture,  Decreased sx's w/ heat lying on left side, avoid lifting,  Increased sx's w/ lifting, using mouse at RUE  X ray from 22   FINDINGS:   There is no fracture, subluxation or osseous destruction.   Mild disc space   narrowing and endplate osteophyte Exercise Program:  HEP instruction:   Access Code: [email or web address]  URL: [email or web address]  Date: 09/15/2022  Prepared by: Alvino Mott     Exercises  Seated Cervical Retraction - 3-5 x daily - 7 x weekly - 2-3 sets - 10 reps - 2-3 hold    Access Code: [email or web address]  URL: [email or web address]  Date: 09/22/2022  Prepared by: Alvino Mott    Exercises  Median Nerve Flossing - Tray - 2 x daily - 7 x weekly - 1-2 sets - 10 reps - 3 hold  Median Nerve Tensioner - 2 x daily - 7 x weekly - 1-2 sets - 10 reps - 3 hold    The patient demonstrated good tolerance to and understanding of the HEP. Written instructions have been issued. Therapeutic Exercise and NMR EXR  [] (28707) Provided verbal/tactile cueing for activities related to strengthening, flexibility, endurance, ROM  for improvements in cervical, postural, scapular, scapulothoracic and UE control with self care, reaching, carrying, lifting, house/yardwork, driving/computer work.    [] (05815) Provided verbal/tactile cueing for activities related to improving balance, coordination, kinesthetic sense, posture, motor skill, proprioception  to assist with cervical, scapular, scapulothoracic and UE control with self care, reaching, carrying, lifting, house/yardwork, driving/computer work. Therapeutic Activities:    [] (94797 or 81331) Provided verbal/tactile cueing for activities related to improving balance, coordination, kinesthetic sense, posture, motor skill, proprioception and motor activation to allow for proper function of cervical, scapular, scapulothoracic and UE control with self care, carrying, lifting, driving/computer work.      Home Exercise Program:    [] (90783) Reviewed/Progressed HEP activities related to strengthening, flexibility, endurance, ROM of cervical, scapular, scapulothoracic and UE control with self care, reaching, carrying, lifting, house/yardwork, driving/computer work  [] (52181) Reviewed/Progressed HEP activities related to improving balance, coordination, kinesthetic sense, posture, motor skill, proprioception of cervical, scapular, scapulothoracic and UE control with self care, reaching, carrying, lifting, house/yardwork, driving/computer work      Manual Treatments:  PROM / STM / Oscillations-Mobs:  G-I, II, III, IV (Doris, Inf., Post.)  [] (31911) Provided manual therapy to mobilize soft tissue/joints of cervical/CT, scapular GHJ and UE for the purpose of decreasing headache, modulating pain, promoting relaxation,  increasing ROM, reducing/eliminating soft tissue swelling/inflammation/restriction, improving soft tissue extensibility and allowing for proper ROM for normal function with self care, reaching, carrying, lifting, house/yardwork, driving/computer work    If [facility] Please Indicate Time In/Out  CPT Code Time in Time out                                   Approval Dates:  CPT Code Units Approved Units Used  Date Updated:                     Charges:  Timed Code Treatment Minutes: 60   Total Treatment Minutes: 65     [] EVAL (LOW) 68734 (typically 20 minutes face-to-face)  [] EVAL (MOD) 13224 (typically 30 minutes face-to-face)  [] EVAL (HIGH) 50297 (typically 45 minutes face-to-face)  [] RE-EVAL     [x] XA(74582) x     [] Dry needle 1 or 2 Muscles (57514)  [] NMR (83014) x     [] Dry needle 3+ Muscles (27987)  [x] Manual (61437) x  2   [] Ultrasound (80513) x  [] TA (91198) x     [x] Mech Traction (91282)  [] ES(attended) (06855)     [] ES (un) (01073):   [] Vasopump (68138) [] Ionto (68473)   [] Other:    GOALS:  Patient stated goal: relieve numbness and pain   [] Progressing: [] Met: [] Not Met: [] Adjusted     Therapist goals for Patient:   Short Term Goals: To be achieved in: 2 weeks  1. Independent in HEP and progression per patient tolerance, in order to prevent re-injury. [] Progressing: [] Met: [] Not Met: [] Adjusted  2.  Patient will have a decrease in pain 0-4/10 to facilitate improvement in movement, function, and ADLs as indicated by Functional Deficits. [] Progressing: [] Met: [] Not Met: [] Adjusted     Long Term Goals: To be achieved in: 4-6 weeks  1. Increase FOTO functional outcome score from 55 to 71  to assist with reaching prior level of function. [] Progressing: [] Met: [] Not Met: [] Adjusted  2. Patient will demonstrate increased AROM to [facility] of cervical/thoracic spine to allow for proper joint functioning as indicated by patients Functional Deficits. [] Progressing: [] Met: [] Not Met: [] Adjusted  3. Patient will demonstrate an increase in postural awareness and control and activation of  Deep cervical stabilizers to allow for proper functional mobility as indicated by patients Functional Deficits. [] Progressing: [] Met: [] Not Met: [] Adjusted  4. Patient will return to work related and playing music  functional activities without increased symptoms or restriction. [] Progressing: [] Met: [] Not Met: [] Adjusted  5. Patient will have a decrease in pain 0-2/10 to facilitate improvement in movement, function, and ADLs as indicated by Functional Deficits. [] Progressing: [] Met: [] Not Met: [] Adjusted     ASSESSMENT:  09/20/22: Tolerated treatment well. Good tolerance to man cervical traction,very tight suboccipitals and decreased cervical rotation. Treatment/Activity Tolerance:  [x] Patient tolerated treatment well [] Patient limited by fatique  [] Patient limited by pain  [] Patient limited by other medical complications  [] Other:     Overall Progression Towards Functional goals/ Treatment Progress Update:  [] Patient is progressing as expected towards functional goals listed. [] Progression is slowed due to complexities/Impairments listed. [] Progression has been slowed due to co-morbidities.   [x] Plan just implemented, too soon to assess goals progression <30days   [] Goals require adjustment due to lack of progress  [] Patient is not progressing as expected and requires additional follow up with physician  [] Other    Prognosis for POC: [x] Good [] Fair  [] Poor    Patient requires continued skilled intervention: [x] Yes  [] No        PLAN:  Teach Cerv. Stabilization w/ movement to avoid symptoms w/ turning head , reassess effectiveness of CTx   Cont. As above , add nerve gliding, build postural muscle strength and awareness , centralize symptoms   [] Continue per plan of care [] Alter current plan (see comments)  [x] Plan of care initiated [] Hold pending MD visit [] Discharge    Electronically signed by: Jurgen Mosqueda PT    Note: If patient does not return for scheduled/recommended follow up visits, this note will serve as a discharge from care along with the most recent update on progress.

## 2022-09-23 ENCOUNTER — HOSPITAL ENCOUNTER (OUTPATIENT)
Dept: MRI IMAGING | Age: 67
Discharge: HOME | End: 2022-09-23
Payer: MEDICARE

## 2022-09-23 DIAGNOSIS — M48.02 CERVICAL STENOSIS OF SPINE: ICD-10-CM

## 2022-09-23 PROCEDURE — 72141 MRI NECK SPINE W/O DYE: CPT

## 2022-09-27 ENCOUNTER — APPOINTMENT (OUTPATIENT)
Dept: PHYSICAL THERAPY | Age: 67
End: 2022-09-27
Payer: MEDICARE

## 2022-09-29 ENCOUNTER — APPOINTMENT (OUTPATIENT)
Dept: PHYSICAL THERAPY | Age: 67
End: 2022-09-29
Payer: MEDICARE

## 2022-10-04 ENCOUNTER — HOSPITAL ENCOUNTER (OUTPATIENT)
Dept: PHYSICAL THERAPY | Age: 67
Setting detail: THERAPIES SERIES
Discharge: HOME | End: 2022-10-04
Payer: MEDICARE

## 2022-10-04 PROCEDURE — 97140 MANUAL THERAPY 1/> REGIONS: CPT

## 2022-10-04 PROCEDURE — 97012 MECHANICAL TRACTION THERAPY: CPT

## 2022-10-04 NOTE — FLOWSHEET NOTE
[address] [facility] [place]. [address] [address]  Phone: [phone]   Fax:     [phone]    Physical Therapy Treatment Note/ Progress Report:     Date:  10/04/2022    Patient Name:  Costa Milan    :  1955  MRN: [id number]    Pertinent Medical History:      Medical/Treatment Diagnosis Information:  Medical Diagnosis: Cervical stenosis of spine [M48.02]  Treatment Diagnosis: cervical hypomobility , reduced tolerance to use of RUE , signs consisant with right cervical radiculopathy    Insurance/Certification information:  PT Insurance Information: [id number] [employer]. MEDICARE  Physician Information:  Roxana Birch MD  Plan of care signed (Y/N): routed 9/15    Date of Patient follow up with Physician:      Progress Report: []  Yes  [x]  No     Date Range for reporting period:  Beginning: 10/4/2022  Ending:     Progress report due (10 Rx/or 30 days whichever is less):      Recertification due (POC duration/ or 90 days whichever is less):      Visit # Insurance/POC Allowable Auth Needed    Wolm Erb / NO DED / $40 COPAY / Watt Bergen / []Yes    [x]No     Functional Outcomes Measure:    Date Assessed: at eval=55  Test: FOTO  Score:    Pain level:  1-2/10     HISTORY OF INJURY:Patient stated complaint: Insidious onset of right arm pain about 3 weeks ago after waking, denies injury and previous h/o, works full time as an  in a [employer] firm, activities include music  play [employer] , c/o constant pain right shoulder blade pain that radiates to elbow/ forearm, numbness to thumb and 2nd/ 3rd digits , decreased pain post oral steroids and acupuncture,  Decreased sx's w/ heat lying on left side, avoid lifting,  Increased sx's w/ lifting, using mouse at RUE  X ray from 22   FINDINGS:   There is no fracture, subluxation or osseous destruction.   Mild disc space   narrowing and endplate osteophyte formation is present at the C5/6 and C6/7   levels. Prevertebral soft tissues are within normal limits. The lung apices   are clear. SUBJECTIVE:  09/20/22: Patient reports numbness still about the same in the thumb and 2nd and 3rd digits. States some soreness in right shoulder blade area after playing in a festival over the weekend. 9/22/22 Pt reports increased pain and numbness at neck, right shoulder and UE/ hand  from Tuesday to present. Not sure why. 10/4/22 Pt reports decreased pain but his T&N are about the same. OBJECTIVE:   Observation:   Test measurements:      RESTRICTIONS/PRECAUTIONS:     Exercises/Interventions:   Therapeutic Ex (01108)  Min: 5 Resistance/Repetitions Notes   Supine chin tuck    Scap retraction    UT stretch                        HEP 10/4 reviewed from 9/22  Corrected tech. With tray and tensioner    Manual Intervention (20615)  Min: 30     Cerv mobs/manip OA, AA flexion gr 3 C3-4 R rotation gr 3,sideglides,man cerv traction  Supine retraction assist 10 x 2   C6-7 and C7-T1 right rotation gr 3            Man ctx decreased RUE symptoms temporary    Thoracic mobs/manip     CT manip     Rib mobilizations Bilat. 1 st ribs gr 3     STM/IRight UT's, scalenes     MET  C6 left rotated in flexion     Nerve glides Right median Nerve              NMR re-education (69566)  Min:     Wall tucks      Corner lat push outs      T slide NS  rows                         Therapeutic Activity (57968)  Min:     Neutral spine 9/15 seated at computer and standing at computer , discussed and demonstrated to pt. Pt assumed NS and tolerated well                   Modalities  Min:      CTx 21/7# 60/20 x 10 min           Other Therapeutic Activities:  9/15/22 Pt was educated on PT POC, Diagnosis, Prognosis, pathomechanics relative to cervical radiculopathy  as well as frequency and duration of scheduling future physical therapy appointments.  Time was also taken on this day to answer all patient questions and participation in PT. Reviewed appointment policy in detail with patient and patient verbalized understanding. Home Exercise Program:  HEP instruction:   Access Code: [email or web address]  URL: [email or web address]  Date: 09/15/2022  Prepared by: [employer]     Exercises  Seated Cervical Retraction - 3-5 x daily - 7 x weekly - 2-3 sets - 10 reps - 2-3 hold    Access Code: [email or web address]  URL: [email or web address]  Date: 09/22/2022  Prepared by: [employer]    Exercises  Median Nerve Flossing - Tray - 2 x daily - 7 x weekly - 1-2 sets - 10 reps - 3 hold  Median Nerve Tensioner - 2 x daily - 7 x weekly - 1-2 sets - 10 reps - 3 hold    The patient demonstrated good tolerance to and understanding of the HEP. Written instructions have been issued. Therapeutic Exercise and NMR EXR  [] (71636) Provided verbal/tactile cueing for activities related to strengthening, flexibility, endurance, ROM  for improvements in cervical, postural, scapular, scapulothoracic and UE control with self care, reaching, carrying, lifting, house/yardwork, driving/computer work.    [] (18095) Provided verbal/tactile cueing for activities related to improving balance, coordination, kinesthetic sense, posture, motor skill, proprioception  to assist with cervical, scapular, scapulothoracic and UE control with self care, reaching, carrying, lifting, house/yardwork, driving/computer work. Therapeutic Activities:    [] (74813 or 08626) Provided verbal/tactile cueing for activities related to improving balance, coordination, kinesthetic sense, posture, motor skill, proprioception and motor activation to allow for proper function of cervical, scapular, scapulothoracic and UE control with self care, carrying, lifting, driving/computer work.      Home Exercise Program:    [] (69381) Reviewed/Progressed HEP activities related to strengthening, flexibility, endurance, ROM of cervical, scapular, scapulothoracic and UE control with self care, reaching, carrying, lifting, house/yardwork, driving/computer work  [] (65390) Reviewed/Progressed HEP activities related to improving balance, coordination, kinesthetic sense, posture, motor skill, proprioception of cervical, scapular, scapulothoracic and UE control with self care, reaching, carrying, lifting, house/yardwork, driving/computer work      Manual Treatments:  PROM / STM / Oscillations-Mobs:  G-I, II, III, IV (PA's, Inf., Post.)  [] ([id number]) Provided manual therapy to mobilize soft tissue/joints of cervical/CT, scapular GHJ and UE for the purpose of decreasing headache, modulating pain, promoting relaxation,  increasing ROM, reducing/eliminating soft tissue swelling/inflammation/restriction, improving soft tissue extensibility and allowing for proper ROM for normal function with self care, reaching, carrying, lifting, house/yardwork, driving/computer work    If [facility] Please Indicate Time In/Out  CPT Code Time in Time out                                   Approval Dates:  CPT Code Units Approved Units Used  Date Updated:                     Charges:  Timed Code Treatment Minutes: 45   Total Treatment Minutes: 48     [] EVAL (LOW) 26865 (typically 20 minutes face-to-face)  [] EVAL (MOD) 29746 (typically 30 minutes face-to-face)  [] EVAL (HIGH) 32029 (typically 45 minutes face-to-face)  [] RE-EVAL     [] ZN(01464) x     [] Dry needle 1 or 2 Muscles (34176)  [] NMR (60185) x     [] Dry needle 3+ Muscles (65983)  [x] Manual (94243) x  2   [] Ultrasound (22144) x  [] TA (44034) x     [x] Mech Traction (48956)  [] ES(attended) (96796)     [] ES (un) (94575):   [] Vasopump (97610) [] Ionto (89276)   [] Other:    GOALS:  Patient stated goal: relieve numbness and pain   [] Progressing: [] Met: [] Not Met: [] Adjusted     Therapist goals for Patient:   Short Term Goals: To be achieved in: 2 weeks  1. Independent in HEP and progression per patient tolerance, in order to prevent re-injury.    [] Progressing: [] Met: [] Not Met: [] Adjusted  2. Patient will have a decrease in pain 0-4/10 to facilitate improvement in movement, function, and ADLs as indicated by Functional Deficits. [] Progressing: [] Met: [] Not Met: [] Adjusted     Long Term Goals: To be achieved in: 4-6 weeks  1. Increase FOTO functional outcome score from 55 to 71  to assist with reaching prior level of function. [] Progressing: [] Met: [] Not Met: [] Adjusted  2. Patient will demonstrate increased AROM to [facility] of cervical/thoracic spine to allow for proper joint functioning as indicated by patients Functional Deficits. [] Progressing: [] Met: [] Not Met: [] Adjusted  3. Patient will demonstrate an increase in postural awareness and control and activation of  Deep cervical stabilizers to allow for proper functional mobility as indicated by patients Functional Deficits. [] Progressing: [] Met: [] Not Met: [] Adjusted  4. Patient will return to work related and playing music  functional activities without increased symptoms or restriction. [] Progressing: [] Met: [] Not Met: [] Adjusted  5. Patient will have a decrease in pain 0-2/10 to facilitate improvement in movement, function, and ADLs as indicated by Functional Deficits. [] Progressing: [] Met: [] Not Met: [] Adjusted     ASSESSMENT:  09/20/22: Tolerated treatment well. Good tolerance to man cervical traction,very tight suboccipitals and decreased cervical rotation. Treatment/Activity Tolerance:  [x] Patient tolerated treatment well [] Patient limited by fatique  [] Patient limited by pain  [] Patient limited by other medical complications  [] Other:     Overall Progression Towards Functional goals/ Treatment Progress Update:  [] Patient is progressing as expected towards functional goals listed. [x] Progression is slowed due to complexities/Impairments listed. [] Progression has been slowed due to co-morbidities.   [] Plan just implemented, too soon to assess goals progression <30days [] Goals require adjustment due to lack of progress  [] Patient is not progressing as expected and requires additional follow up with physician  [] Other    Prognosis for POC: [x] Good [] Fair  [] Poor    Patient requires continued skilled intervention: [x] Yes  [] No        PLAN:  Reassess   Teach Cerv. Stabilization w/ movement to avoid symptoms w/ turning head    Cont. As above , add nerve gliding, build postural muscle strength and awareness , centralize symptoms   [] Continue per plan of care [] Alter current plan (see comments)  [x] Plan of care initiated [] Hold pending MD visit [] Discharge    Electronically signed by: Sher Pedroza, PT    Note: If patient does not return for scheduled/recommended follow up visits, this note will serve as a discharge from care along with the most recent update on progress.

## 2022-10-06 ENCOUNTER — HOSPITAL ENCOUNTER (OUTPATIENT)
Dept: PHYSICAL THERAPY | Age: 67
Setting detail: THERAPIES SERIES
Discharge: HOME | End: 2022-10-06
Payer: MEDICARE

## 2022-10-06 PROCEDURE — 97110 THERAPEUTIC EXERCISES: CPT

## 2022-10-06 PROCEDURE — 97140 MANUAL THERAPY 1/> REGIONS: CPT

## 2022-10-06 PROCEDURE — 97012 MECHANICAL TRACTION THERAPY: CPT

## 2022-10-06 NOTE — FLOWSHEET NOTE
[address] [facility] [address] [place] [address]  Phone: [phone]   Fax:     [phone]    Physical Therapy Treatment Note/ Progress Report:     Date:  10/06/2022    Patient Name:  Janes Talley    :  1955  MRN: [id number]    Pertinent Medical History:      Medical/Treatment Diagnosis Information:  Medical Diagnosis: Cervical stenosis of spine [M48.02]  Treatment Diagnosis: cervical hypomobility , reduced tolerance to use of RUE , signs consisant with right cervical radiculopathy    Insurance/Certification information:  PT Insurance Information: [id number] [address] [employer] MEDICARE  Physician Information:  Trenton Mcgarry MD  Plan of care signed (Y/N): routed 9/15    Date of Patient follow up with Physician:      Progress Report: []  Yes  [x]  No     Date Range for reporting period:  Beginning: 10/6/2022  Ending:     Progress report due (10 Rx/or 30 days whichever is less): /35     Recertification due (POC duration/ or 90 days whichever is less):      Visit # Insurance/POC Allowable Auth Needed    Idamae Si / NO DED / $40 COPAY / Lanice Warrior / []Yes    [x]No     Functional Outcomes Measure:    Date Assessed: at eval=55  Test: FOTO  Score:    Pain level:  1-2/10     HISTORY OF INJURY:Patient stated complaint: Insidious onset of right arm pain about 3 weeks ago after waking, denies injury and previous h/o, works full time as an  in a [employer] firm, activities include music  play [employer] , c/o constant pain right shoulder blade pain that radiates to elbow/ forearm, numbness to thumb and 2nd/ 3rd digits , decreased pain post oral steroids and acupuncture,  Decreased sx's w/ heat lying on left side, avoid lifting,  Increased sx's w/ lifting, using mouse at RUE  X ray from 22   FINDINGS:   There is no fracture, subluxation or osseous destruction.   Mild disc space   narrowing and endplate osteophyte formation is present at the C5/6 and C6/7   levels. Prevertebral soft tissues are within normal limits. The lung apices   are clear. SUBJECTIVE:  09/20/22: Patient reports numbness still about the same in the thumb and 2nd and 3rd digits. States some soreness in right shoulder blade area after playing in a festival over the weekend. 9/22/22 Pt reports increased pain and numbness at neck, right shoulder and UE/ hand  from Tuesday to present. Not sure why. 10/4/22 Pt reports decreased pain but his T&N are about the same. 10/6/22 Pt reports feeling a little better than he did on Tuesday. He was able to practice Jamin chi and was not able to do so 2 weeks ago. OBJECTIVE:   Observation:   Test measurements:    Cervical AROM Flexion Extension Lateral Flexion  Left Lateral Flex. Right Rotation Left Rotation Right    Eval.         10/6 52 34 33 33 51  61                                                   RESTRICTIONS/PRECAUTIONS:     Exercises/Interventions:   Therapeutic Ex (89205)  Min: 10 Resistance/Repetitions Notes   Supine chin tuck    Scap retraction    UT stretch                        HEP 10/6 reviewed from 9/22  Corrected tech. With tray and tensioner , pt was able to demonstrate proper tech. Following cues and additional practice    Manual Intervention (38518)  Min: 30     Cerv mobs/manip OA, AA flexion gr 3 C3-4 R rotation gr 3,sideglides,man cerv traction            Man ctx decreased RUE symptoms temporary    Thoracic mobs/manip     CT manip     Rib mobilizations Bilat. 1 st ribs gr 3     STM/IRight UT's, scalenes     MET       Nerve glides Right median Nerve    Stretch  to Left and R scales , UT's and Lev scap. [facility] re-education (53698)  Min:     Wall tucks  ADD    Corner lat push outs  ADD    T slide NS  rows ADD                        Therapeutic Activity (35699)  Min:     Neutral spine 9/15 seated at computer and standing at computer , discussed and demonstrated to pt.   Pt assumed NS and tolerated well                   Modalities  Min:      CTx 21/7# 60/20 x 15min           Other Therapeutic Activities:  9/15/22 Pt was educated on PT POC, Diagnosis, Prognosis, pathomechanics relative to cervical radiculopathy  as well as frequency and duration of scheduling future physical therapy appointments. Time was also taken on this day to answer all patient questions and participation in PT. Reviewed appointment policy in detail with patient and patient verbalized understanding. Home Exercise Program:  HEP instruction:   Access Code: [email or web address]  URL: [email or web address]  Date: 09/15/2022  Prepared by: Obed Razo     Exercises  Seated Cervical Retraction - 3-5 x daily - 7 x weekly - 2-3 sets - 10 reps - 2-3 hold    Access Code: [email or web address]  URL: [email or web address]  Date: 09/22/2022  Prepared by: Obed Razo    Exercises  Median Nerve Flossing - Tray - 2 x daily - 7 x weekly - 1-2 sets - 10 reps - 3 hold  Median Nerve Tensioner - 2 x daily - 7 x weekly - 1-2 sets - 10 reps - 3 hold    The patient demonstrated good tolerance to and understanding of the HEP. Written instructions have been issued. Therapeutic Exercise and NMR EXR  [] (84258) Provided verbal/tactile cueing for activities related to strengthening, flexibility, endurance, ROM  for improvements in cervical, postural, scapular, scapulothoracic and UE control with self care, reaching, carrying, lifting, house/yardwork, driving/computer work.    [] (61772) Provided verbal/tactile cueing for activities related to improving balance, coordination, kinesthetic sense, posture, motor skill, proprioception  to assist with cervical, scapular, scapulothoracic and UE control with self care, reaching, carrying, lifting, house/yardwork, driving/computer work.     Therapeutic Activities:    [] (97632 or 74136) Provided verbal/tactile cueing for activities related to improving balance, coordination, kinesthetic sense, posture, motor skill, proprioception and motor activation to allow for proper function of cervical, scapular, scapulothoracic and UE control with self care, carrying, lifting, driving/computer work.      Home Exercise Program:    [] (23921) Reviewed/Progressed HEP activities related to strengthening, flexibility, endurance, ROM of cervical, scapular, scapulothoracic and UE control with self care, reaching, carrying, lifting, house/yardwork, driving/computer work  [] (56913) Reviewed/Progressed HEP activities related to improving balance, coordination, kinesthetic sense, posture, motor skill, proprioception of cervical, scapular, scapulothoracic and UE control with self care, reaching, carrying, lifting, house/yardwork, driving/computer work      Manual Treatments:  PROM / STM / Oscillations-Mobs:  G-I, II, III, IV (PA's, Inf., Post.)  [] (82420) Provided manual therapy to mobilize soft tissue/joints of cervical/CT, scapular GHJ and UE for the purpose of decreasing headache, modulating pain, promoting relaxation,  increasing ROM, reducing/eliminating soft tissue swelling/inflammation/restriction, improving soft tissue extensibility and allowing for proper ROM for normal function with self care, reaching, carrying, lifting, house/yardwork, driving/computer work    If Michelet Tolentino Please Indicate Time In/Out  CPT Code Time in Time out                                   Approval Dates:  CPT Code Units Approved Units Used  Date Updated:                     Charges:  Timed Code Treatment Minutes: 55   Total Treatment Minutes: 60     [] EVAL (LOW) 14150 (typically 20 minutes face-to-face)  [] EVAL (MOD) 86142 (typically 30 minutes face-to-face)  [] EVAL (HIGH) 82353 (typically 45 minutes face-to-face)  [] RE-EVAL     [x] CE(23612) x     [] Dry needle 1 or 2 Muscles (40353)  [] NMR (21765) x     [] Dry needle 3+ Muscles (54516)  [x] Manual (94720) x  2   [] Ultrasound (36308) x  [] TA (99221) x     [x] Mech Traction (24893)  [] ES(attended) (32866)     [] ES (un) (95994):   [] Vasopump (55242) [] Adra Anjum (77720)   [] Other:    GOALS:  Patient stated goal: relieve numbness and pain   [] Progressing: [] Met: [] Not Met: [] Adjusted     Therapist goals for Patient:   Short Term Goals: To be achieved in: 2 weeks  1. Independent in HEP and progression per patient tolerance, in order to prevent re-injury. [] Progressing: [] Met: [] Not Met: [] Adjusted  2. Patient will have a decrease in pain 0-4/10 to facilitate improvement in movement, function, and ADLs as indicated by Functional Deficits. [] Progressing: [] Met: [] Not Met: [] Adjusted     Long Term Goals: To be achieved in: 4-6 weeks  1. Increase FOTO functional outcome score from 55 to 71  to assist with reaching prior level of function. [] Progressing: [] Met: [] Not Met: [] Adjusted  2. Patient will demonstrate increased AROM to [facility] of cervical/thoracic spine to allow for proper joint functioning as indicated by patients Functional Deficits. [] Progressing: [] Met: [] Not Met: [] Adjusted  3. Patient will demonstrate an increase in postural awareness and control and activation of  Deep cervical stabilizers to allow for proper functional mobility as indicated by patients Functional Deficits. [] Progressing: [] Met: [] Not Met: [] Adjusted  4. Patient will return to work related and playing music  functional activities without increased symptoms or restriction. [] Progressing: [] Met: [] Not Met: [] Adjusted  5. Patient will have a decrease in pain 0-2/10 to facilitate improvement in movement, function, and ADLs as indicated by Functional Deficits. [] Progressing: [] Met: [] Not Met: [] Adjusted     ASSESSMENT:  09/20/22: Tolerated treatment well. Good tolerance to man cervical traction,very tight suboccipitals and decreased cervical rotation.     Treatment/Activity Tolerance:  [x] Patient tolerated treatment well [] Patient limited by fatique  [] Patient limited by pain  [] Patient limited by other medical complications  [] Other:     Overall Progression Towards Functional goals/ Treatment Progress Update:  [] Patient is progressing as expected towards functional goals listed. [x] Progression is slowed due to complexities/Impairments listed. [] Progression has been slowed due to co-morbidities. [] Plan just implemented, too soon to assess goals progression <30days   [] Goals require adjustment due to lack of progress  [] Patient is not progressing as expected and requires additional follow up with physician  [] Other    Prognosis for POC: [x] Good [] Fair  [] Poor    Patient requires continued skilled intervention: [x] Yes  [] No        PLAN:  s ADD to [address]. Stabilization w/ movement to avoid symptoms w/ turning head    Cont. As above , add nerve gliding, build postural muscle strength and awareness , centralize symptoms   [] Continue per plan of care [] Alter current plan (see comments)  [x] Plan of care initiated [] Hold pending MD visit [] Discharge    Electronically signed by: Quiana Drake PT    Note: If patient does not return for scheduled/recommended follow up visits, this note will serve as a discharge from care along with the most recent update on progress.

## 2022-10-11 ENCOUNTER — HOSPITAL ENCOUNTER (OUTPATIENT)
Dept: PHYSICAL THERAPY | Age: 67
Setting detail: THERAPIES SERIES
Discharge: HOME | End: 2022-10-11
Payer: MEDICARE

## 2022-10-11 ENCOUNTER — OFFICE VISIT (OUTPATIENT)
Dept: ORTHOPEDIC SURGERY | Age: 67
End: 2022-10-11
Payer: MEDICARE

## 2022-10-11 VITALS — WEIGHT: 157 LBS | HEIGHT: 69 IN | BODY MASS INDEX: 23.25 KG/M2

## 2022-10-11 DIAGNOSIS — M48.02 CERVICAL STENOSIS OF SPINE: Primary | ICD-10-CM

## 2022-10-11 DIAGNOSIS — M47.812 CERVICAL SPONDYLOSIS: ICD-10-CM

## 2022-10-11 PROCEDURE — 99214 OFFICE O/P EST MOD 30 MIN: CPT | Performed by: ORTHOPAEDIC SURGERY

## 2022-10-11 PROCEDURE — 97012 MECHANICAL TRACTION THERAPY: CPT

## 2022-10-11 PROCEDURE — 1123F ACP DISCUSS/DSCN MKR DOCD: CPT | Performed by: ORTHOPAEDIC SURGERY

## 2022-10-11 PROCEDURE — 97140 MANUAL THERAPY 1/> REGIONS: CPT

## 2022-10-11 PROCEDURE — 97112 NEUROMUSCULAR REEDUCATION: CPT

## 2022-10-11 NOTE — FLOWSHEET NOTE
[address] [facility] [address] [place] [address]  Phone: [phone]   Fax:     [phone]    Physical Therapy Treatment Note/ Progress Report:     Date:  10/11/2022    Patient Name:  Sher Burgess    :  1955  MRN: [id number]    Pertinent Medical History:      Medical/Treatment Diagnosis Information:  Medical Diagnosis: Cervical stenosis of spine [M48.02]  Treatment Diagnosis: cervical hypomobility , reduced tolerance to use of RUE , signs consisant with right cervical radiculopathy    Insurance/Certification information:  PT Insurance Information: Morton Grieve MEDICARE  Physician Information:  Cecy Decker MD  Plan of care signed (Y/N): routed 9/15    Date of Patient follow up with Physician:      Progress Report: []  Yes  [x]  No     Date Range for reporting period:  Beginning: 10/11/2022  Ending:     Progress report due (10 Rx/or 30 days whichever is less):      Recertification due (POC duration/ or 90 days whichever is less):      Visit # Insurance/POC Allowable Auth Needed    [facility] Norah / NO DED / $40 COPAY / Rozanne Mylar / []Yes    [x]No     Functional Outcomes Measure:    Date Assessed: at eval=55  Test: FOTO  Score:    Pain level:  1-2/10     HISTORY OF INJURY:Patient stated complaint: Insidious onset of right arm pain about 3 weeks ago after waking, denies injury and previous h/o, works full time as an  in a [employer] firm, activities include music  play [employer] , c/o constant pain right shoulder blade pain that radiates to elbow/ forearm, numbness to thumb and 2nd/ 3rd digits , decreased pain post oral steroids and acupuncture,  Decreased sx's w/ heat lying on left side, avoid lifting,  Increased sx's w/ lifting, using mouse at RUE  X ray from 22   FINDINGS:   There is no fracture, subluxation or osseous destruction.   Mild disc space   narrowing and endplate osteophyte formation is present at the C5/6 and C6/7   levels. Prevertebral soft tissues are within normal limits. The lung apices   are clear. SUBJECTIVE:  09/20/22: Patient reports numbness still about the same in the thumb and 2nd and 3rd digits. States some soreness in right shoulder blade area after playing in a festival over the weekend. 9/22/22 Pt reports increased pain and numbness at neck, right shoulder and UE/ hand  from Tuesday to present. Not sure why. 10/4/22 Pt reports decreased pain but his T&N are about the same. 10/6/22 Pt reports feeling a little better than he did on Tuesday. He was able to practice Jamin chi and was not able to do so 2 weeks ago. 10/11/22 Patient reports right shoulder has been slightly sore last night. States numbness in his index and thumb has been about the same. OBJECTIVE:   Observation:   Test measurements:    Cervical AROM Flexion Extension Lateral Flexion  Left Lateral Flex. Right Rotation Left Rotation Right    Eval.         10/6 52 34 33 33 51  61                                                   RESTRICTIONS/PRECAUTIONS:     Exercises/Interventions:   Therapeutic Ex (74487)  Min:  Resistance/Repetitions Notes   Supine chin tuck    Scap retraction    UT stretch                        HEP 10/6 reviewed from 9/22  Corrected tech. With tray and tensioner , pt was able to demonstrate proper tech. Following cues and additional practice    Manual Intervention (93938)  Min: 30     Cerv mobs/manip OA, AA flexion gr 3 C3-4 R rotation gr 3,sideglides,man cerv traction            Man ctx decreased RUE symptoms temporary    Thoracic mobs/manip     CT manip     Rib mobilizations Bilat. 1 st ribs gr 3     STM/IASTM Right UT's, scalenes     MET       Nerve glides Right median Nerve    Stretch  to Left and R scales , UT's and Lev scap.           NMR re-education (21497)  Min: 10     Wall tucks  ADD    Corner lat push outs  10 x 2     T slide NS  rows Blue 10 x 2 Therapeutic Activity (37903)  Min:     Neutral spine 9/15 seated at computer and standing at computer , discussed and demonstrated to pt. Pt assumed NS and tolerated well                   Modalities  Min: 15     CTx 21/7# 60/20 x 15min           Other Therapeutic Activities:  9/15/22 Pt was educated on PT POC, Diagnosis, Prognosis, pathomechanics relative to cervical radiculopathy  as well as frequency and duration of scheduling future physical therapy appointments. Time was also taken on this day to answer all patient questions and participation in PT. Reviewed appointment policy in detail with patient and patient verbalized understanding. Home Exercise Program:  HEP instruction:   Access Code: [email or web address]  URL: [email or web address]  Date: 09/15/2022  Prepared by: Merline Barman     Exercises  Seated Cervical Retraction - 3-5 x daily - 7 x weekly - 2-3 sets - 10 reps - 2-3 hold    Access Code: [email or web address]  URL: [email or web address]  Date: 09/22/2022  Prepared by: Merline Barman    Exercises  Median Nerve Flossing - Tray - 2 x daily - 7 x weekly - 1-2 sets - 10 reps - 3 hold  Median Nerve Tensioner - 2 x daily - 7 x weekly - 1-2 sets - 10 reps - 3 hold    The patient demonstrated good tolerance to and understanding of the HEP. Written instructions have been issued. Therapeutic Exercise and NMR EXR  [] (53516) Provided verbal/tactile cueing for activities related to strengthening, flexibility, endurance, ROM  for improvements in cervical, postural, scapular, scapulothoracic and UE control with self care, reaching, carrying, lifting, house/yardwork, driving/computer work.    [] (89098) Provided verbal/tactile cueing for activities related to improving balance, coordination, kinesthetic sense, posture, motor skill, proprioception  to assist with cervical, scapular, scapulothoracic and UE control with self care, reaching, carrying, lifting, house/yardwork, driving/computer work.     Therapeutic Activities:    [] (18200 or 53389) Provided verbal/tactile cueing for activities related to improving balance, coordination, kinesthetic sense, posture, motor skill, proprioception and motor activation to allow for proper function of cervical, scapular, scapulothoracic and UE control with self care, carrying, lifting, driving/computer work.      Home Exercise Program:    [] (37573) Reviewed/Progressed HEP activities related to strengthening, flexibility, endurance, ROM of cervical, scapular, scapulothoracic and UE control with self care, reaching, carrying, lifting, house/yardwork, driving/computer work  [] (17835) Reviewed/Progressed HEP activities related to improving balance, coordination, kinesthetic sense, posture, motor skill, proprioception of cervical, scapular, scapulothoracic and UE control with self care, reaching, carrying, lifting, house/yardwork, driving/computer work      Manual Treatments:  PROM / STM / Oscillations-Mobs:  G-I, II, III, IV (PA's, Inf., Post.)  [] (70561) Provided manual therapy to mobilize soft tissue/joints of cervical/CT, scapular GHJ and UE for the purpose of decreasing headache, modulating pain, promoting relaxation,  increasing ROM, reducing/eliminating soft tissue swelling/inflammation/restriction, improving soft tissue extensibility and allowing for proper ROM for normal function with self care, reaching, carrying, lifting, house/yardwork, driving/computer work    If [facility] Please Indicate Time In/Out  CPT Code Time in Time out                                   Approval Dates:  CPT Code Units Approved Units Used  Date Updated:                     Charges:  Timed Code Treatment Minutes: 55   Total Treatment Minutes: 55     [] EVAL (LOW) 49815 (typically 20 minutes face-to-face)  [] EVAL (MOD) 53368 (typically 30 minutes face-to-face)  [] EVAL (HIGH) 06906 (typically 45 minutes face-to-face)  [] RE-EVAL     [] HS(90837) x     [] Dry needle 1 or 2 Muscles (96653)  [x] NMR (04509) x [] Dry needle 3+ Muscles (57873)  [x] Manual (55701) x  2   [] Ultrasound (78042) x  [] TA (15604) x     [x] Mech Traction (29974)  [] ES(attended) (92959)     [] ES (un) (76165):   [] Vasopump (35343) [] Ionto (61778)   [] Other:    GOALS:  Patient stated goal: relieve numbness and pain   [] Progressing: [] Met: [] Not Met: [] Adjusted     Therapist goals for Patient:   Short Term Goals: To be achieved in: 2 weeks  1. Independent in HEP and progression per patient tolerance, in order to prevent re-injury. [] Progressing: [] Met: [] Not Met: [] Adjusted  2. Patient will have a decrease in pain 0-4/10 to facilitate improvement in movement, function, and ADLs as indicated by Functional Deficits. [] Progressing: [] Met: [] Not Met: [] Adjusted     Long Term Goals: To be achieved in: 4-6 weeks  1. Increase FOTO functional outcome score from 55 to 71  to assist with reaching prior level of function. [] Progressing: [] Met: [] Not Met: [] Adjusted  2. Patient will demonstrate increased AROM to [facility] of cervical/thoracic spine to allow for proper joint functioning as indicated by patients Functional Deficits. [] Progressing: [] Met: [] Not Met: [] Adjusted  3. Patient will demonstrate an increase in postural awareness and control and activation of  Deep cervical stabilizers to allow for proper functional mobility as indicated by patients Functional Deficits. [] Progressing: [] Met: [] Not Met: [] Adjusted  4. Patient will return to work related and playing music  functional activities without increased symptoms or restriction. [] Progressing: [] Met: [] Not Met: [] Adjusted  5. Patient will have a decrease in pain 0-2/10 to facilitate improvement in movement, function, and ADLs as indicated by Functional Deficits. [] Progressing: [] Met: [] Not Met: [] Adjusted     ASSESSMENT:  10/11/22: Tolerated treatment well. Slight decrease of finger numbness after rx. Improving mid cervical mobility.     Treatment/Activity Tolerance:  [x] Patient tolerated treatment well [] Patient limited by fatique  [] Patient limited by pain  [] Patient limited by other medical complications  [] Other:     Overall Progression Towards Functional goals/ Treatment Progress Update:  [] Patient is progressing as expected towards functional goals listed. [x] Progression is slowed due to complexities/Impairments listed. [] Progression has been slowed due to co-morbidities. [] Plan just implemented, too soon to assess goals progression <30days   [] Goals require adjustment due to lack of progress  [] Patient is not progressing as expected and requires additional follow up with physician  [] Other    Prognosis for POC: [x] Good [] Fair  [] Poor    Patient requires continued skilled intervention: [x] Yes  [] No        PLAN:  s ADD to [address]. Stabilization w/ movement to avoid symptoms w/ turning head    Cont. As above , add nerve gliding, build postural muscle strength and awareness , centralize symptoms   [] Continue per plan of care [] Alter current plan (see comments)  [x] Plan of care initiated [] Hold pending MD visit [] Discharge    Electronically signed by: Alen Montgomery PTA    Note: If patient does not return for scheduled/recommended follow up visits, this note will serve as a discharge from care along with the most recent update on progress.

## 2022-10-11 NOTE — PROGRESS NOTES
Cathy Pollard  [id number]  October 11, 2022    Chief Complaint   Patient presents with    Follow-up     MRI cervical spine done on 9/26/22       History: The patient is a 59-year-old gentleman who is here for evaluation of his right arm. His symptoms have improved significantly, but he continues to have pain in his right shoulder/periscapular region. He also continues to have persistent numbness in his thumb and index finger. He steroids did seem to help. He is no longer taking the Robaxin. He did try acupuncture on 2 separate occasions. He is left-hand dominant. He has no prior history of issues with his right arm. He cannot take anti-inflammatories since he is on Eliquis. The patient's  past medical history, medications, allergies,  family history, social history, and have been reviewed, and dated and are recorded in the chart. Pertinent items are noted in HPI. Review of systems reviewed from Pertinent History Form dated on 9/8 and available in the patient's chart under the Media tab. Vitals:  Ht 5' 9\" (1.753 m)   Wt 157 lb (71.2 kg)   BMI 23.18 kg/m²     Physical: On examination today, the patient is alert and oriented x3. He does have limited range of motion to the cervical spine. He has diffuse pericervical muscle tightness and tenderness. Spurling sign off to the right is positive. He has full range of motion of both shoulders. There is no evidence of atrophy. He has minimal to no weakness with his elbow flexion. There is slight weakness with right wrist extension. Examination of the skin reveals no lesions or ulcerations. He does have some slight subjective decreased sensation in the thumb on the right side. X-rays: MRI of the cervical spine was extensively reviewed. The MRI confirmed mild central canal stenosis of the cervical spine. There is severe right and moderate left neural foraminal narrowing at the C5-C6 level secondary to a disc bulge and uncovertebral overgrowth. There is severe right-sided neural foraminal narrowing at the C6-C7 level as well. There is some facet arthropathy. Impression: #1 cervical degenerative disc disease #2 cervical spondylosis with stenosis #3 neuroforaminal stenosis C5-C6 level and C6-C7 with    Plan: We will send the patient to Dr. Augustus Peres. He was encouraged to modify his activities. He will continue taking the Tylenol. He may continue his rehab program.  The patient will follow up with me in 6 weeks and we will reassess him then. If he continues to have issues we will need to consider other options.       Orders Placed This Encounter   Procedures    Trent Stout MD, Pain Management, [facility]     Referral Priority:   Routine     Referral Type:   Eval and Treat     Referral Reason:   Specialty Services Required     Referred to Provider:   Lisette Holguin MD     Requested Specialty:   Anesthesiology     Number of Visits Requested:   1

## 2022-10-13 ENCOUNTER — HOSPITAL ENCOUNTER (OUTPATIENT)
Dept: PHYSICAL THERAPY | Age: 67
Setting detail: THERAPIES SERIES
Discharge: HOME | End: 2022-10-13
Payer: MEDICARE

## 2022-10-13 PROCEDURE — 97112 NEUROMUSCULAR REEDUCATION: CPT

## 2022-10-13 PROCEDURE — 97012 MECHANICAL TRACTION THERAPY: CPT

## 2022-10-13 PROCEDURE — 97140 MANUAL THERAPY 1/> REGIONS: CPT

## 2022-10-13 NOTE — FLOWSHEET NOTE
[address] [facility]. [address]  Phone: [phone]   Fax:     [phone]    Physical Therapy Treatment Note/ Progress Report:     Date:  10/13/2022    Patient Name:  Devin Young    :  1955  MRN: [id number]    Pertinent Medical History:      Medical/Treatment Diagnosis Information:  Medical Diagnosis: Cervical stenosis of spine [M48.02]  Treatment Diagnosis: cervical hypomobility , reduced tolerance to use of RUE , signs consisant with right cervical radiculopathy    Insurance/Certification information:  PT Insurance Information: [id number] [address] [employer]. MEDICARE  Physician Information:  Maisha Sharma MD  Plan of care signed (Y/N): routed 9/15    Date of Patient follow up with Physician:      Progress Report: []  Yes  [x]  No     Date Range for reporting period:  Beginning: 10/13/2022  Ending:     Progress report due (10 Rx/or 30 days whichever is less):      Recertification due (POC duration/ or 90 days whichever is less):      Visit # Insurance/POC Allowable Auth Needed    [place] / NO DED / $40 COPAY / Anner Baumgarten / []Yes    [x]No     Functional Outcomes Measure:    Date Assessed: at eval=55  Test: FOTO  Score:    Pain level:  1-2/10     HISTORY OF INJURY:Patient stated complaint: Insidious onset of right arm pain about 3 weeks ago after waking, denies injury and previous h/o, works full time as an  in a [employer] firm, activities include music  play [employer] , c/o constant pain right shoulder blade pain that radiates to elbow/ forearm, numbness to thumb and 2nd/ 3rd digits , decreased pain post oral steroids and acupuncture,  Decreased sx's w/ heat lying on left side, avoid lifting,  Increased sx's w/ lifting, using mouse at RUE  X ray from 22   FINDINGS:   There is no fracture, subluxation or osseous destruction.   Mild disc space   narrowing and endplate osteophyte formation is present at the C5/6 and C6/7   levels. Prevertebral soft tissues are within normal limits. The lung apices   are clear. SUBJECTIVE:  09/20/22: Patient reports numbness still about the same in the thumb and 2nd and 3rd digits. States some soreness in right shoulder blade area after playing in a festival over the weekend. 9/22/22 Pt reports increased pain and numbness at neck, right shoulder and UE/ hand  from Tuesday to present. Not sure why. 10/4/22 Pt reports decreased pain but his T&N are about the same. 10/6/22 Pt reports feeling a little better than he did on Tuesday. He was able to practice Jamin chi and was not able to do so 2 weeks ago. 10/11/22 Patient reports right shoulder has been slightly sore last night. States numbness in his index and thumb has been about the same. 10/13/22: Patient reports numbness in his index and thumb is not as intense today. States minimal soreness in right shoulder. OBJECTIVE:   Observation:   Test measurements:    Cervical AROM Flexion Extension Lateral Flexion  Left Lateral Flex. Right Rotation Left Rotation Right    Eval.         10/6 52 34 33 33 51  61                                                   RESTRICTIONS/PRECAUTIONS:     Exercises/Interventions:   Therapeutic Ex (30930)  Min:  Resistance/Repetitions Notes   Supine chin tuck    Scap retraction    UT stretch                        HEP 10/6 reviewed from 9/22  Corrected tech. With tray and tensioner , pt was able to demonstrate proper tech. Following cues and additional practice    Manual Intervention (17232)  Min: 30     Cerv mobs/manip OA, AA flexion gr 3 C3-4 R rotation gr 3,sideglides,man cerv traction            Man ctx decreased RUE symptoms temporary    Thoracic mobs/manip     CT manip     Rib mobilizations Bilat. 1 st ribs gr 3     STM/IASTM Right UT's, scalenes     MET       Nerve glides Right median Nerve    Stretch  to Left and R scales , UT's and Lev scap.           NMR re-education (18228)  Min: 10     Wall tucks  ADD    Corner lat push outs  10 x 2     T slide NS  rows Blue 10 x 2                         Therapeutic Activity (51613)  Min:     Neutral spine 9/15 seated at computer and standing at computer , discussed and demonstrated to pt. Pt assumed NS and tolerated well                   Modalities  Min: 15     CTx 21/7# 60/20 x 15min           Other Therapeutic Activities:  9/15/22 Pt was educated on PT POC, Diagnosis, Prognosis, pathomechanics relative to cervical radiculopathy  as well as frequency and duration of scheduling future physical therapy appointments. Time was also taken on this day to answer all patient questions and participation in PT. Reviewed appointment policy in detail with patient and patient verbalized understanding. Home Exercise Program:  HEP instruction:   Access Code: [email or web address]  URL: [email or web address]  Date: 09/15/2022  Prepared by: Deven Looney     Exercises  Seated Cervical Retraction - 3-5 x daily - 7 x weekly - 2-3 sets - 10 reps - 2-3 hold    Access Code: [email or web address]  URL: [email or web address]  Date: 09/22/2022  Prepared by: Deven Looney    Exercises  Median Nerve Flossing - Tray - 2 x daily - 7 x weekly - 1-2 sets - 10 reps - 3 hold  Median Nerve Tensioner - 2 x daily - 7 x weekly - 1-2 sets - 10 reps - 3 hold    The patient demonstrated good tolerance to and understanding of the HEP. Written instructions have been issued.      Therapeutic Exercise and NMR EXR  [] (51303) Provided verbal/tactile cueing for activities related to strengthening, flexibility, endurance, ROM  for improvements in cervical, postural, scapular, scapulothoracic and UE control with self care, reaching, carrying, lifting, house/yardwork, driving/computer work.    [] (02576) Provided verbal/tactile cueing for activities related to improving balance, coordination, kinesthetic sense, posture, motor skill, proprioception  to assist with cervical, scapular, scapulothoracic and UE control with self care, reaching, carrying, lifting, house/yardwork, driving/computer work. Therapeutic Activities:    [] (56900 or 66612) Provided verbal/tactile cueing for activities related to improving balance, coordination, kinesthetic sense, posture, motor skill, proprioception and motor activation to allow for proper function of cervical, scapular, scapulothoracic and UE control with self care, carrying, lifting, driving/computer work.      Home Exercise Program:    [] (31539) Reviewed/Progressed HEP activities related to strengthening, flexibility, endurance, ROM of cervical, scapular, scapulothoracic and UE control with self care, reaching, carrying, lifting, house/yardwork, driving/computer work  [] (03832) Reviewed/Progressed HEP activities related to improving balance, coordination, kinesthetic sense, posture, motor skill, proprioception of cervical, scapular, scapulothoracic and UE control with self care, reaching, carrying, lifting, house/yardwork, driving/computer work      Manual Treatments:  PROM / STM / Oscillations-Mobs:  G-I, II, III, IV (PA's, Inf., Post.)  [] (64650) Provided manual therapy to mobilize soft tissue/joints of cervical/CT, scapular GHJ and UE for the purpose of decreasing headache, modulating pain, promoting relaxation,  increasing ROM, reducing/eliminating soft tissue swelling/inflammation/restriction, improving soft tissue extensibility and allowing for proper ROM for normal function with self care, reaching, carrying, lifting, house/yardwork, driving/computer work    If [facility] Please Indicate Time In/Out  CPT Code Time in Time out                                   Approval Dates:  CPT Code Units Approved Units Used  Date Updated:                     Charges:  Timed Code Treatment Minutes: 55   Total Treatment Minutes: 55     [] EVAL (LOW) 68182 (typically 20 minutes face-to-face)  [] EVAL (MOD) 25624 (typically 30 minutes face-to-face)  [] EVAL (HIGH) 01100 (typically 45 minutes face-to-face)  [] RE-EVAL     [] NO(90999) x     [] Dry needle 1 or 2 Muscles (44301)  [x] NMR (55522) x     [] Dry needle 3+ Muscles (44376)  [x] Manual (40292) x  2   [] Ultrasound (65292) x  [] TA (63635) x     [x] Mech Traction (01201)  [] ES(attended) (48677)     [] ES (un) (97324):   [] Vasopump (26310) [] Ionto (62328)   [] Other:    GOALS:  Patient stated goal: relieve numbness and pain   [] Progressing: [] Met: [] Not Met: [] Adjusted     Therapist goals for Patient:   Short Term Goals: To be achieved in: 2 weeks  1. Independent in HEP and progression per patient tolerance, in order to prevent re-injury. [] Progressing: [] Met: [] Not Met: [] Adjusted  2. Patient will have a decrease in pain 0-4/10 to facilitate improvement in movement, function, and ADLs as indicated by Functional Deficits. [] Progressing: [] Met: [] Not Met: [] Adjusted     Long Term Goals: To be achieved in: 4-6 weeks  1. Increase FOTO functional outcome score from 55 to 71  to assist with reaching prior level of function. [] Progressing: [] Met: [] Not Met: [] Adjusted  2. Patient will demonstrate increased AROM to [facility] of cervical/thoracic spine to allow for proper joint functioning as indicated by patients Functional Deficits. [] Progressing: [] Met: [] Not Met: [] Adjusted  3. Patient will demonstrate an increase in postural awareness and control and activation of  Deep cervical stabilizers to allow for proper functional mobility as indicated by patients Functional Deficits. [] Progressing: [] Met: [] Not Met: [] Adjusted  4. Patient will return to work related and playing music  functional activities without increased symptoms or restriction. [] Progressing: [] Met: [] Not Met: [] Adjusted  5. Patient will have a decrease in pain 0-2/10 to facilitate improvement in movement, function, and ADLs as indicated by Functional Deficits.   [] Progressing: [] Met: [] Not Met: [] Adjusted     ASSESSMENT: 10/11/22: Tolerated treatment well. Slight decrease of finger numbness after rx. Improving mid cervical mobility. Treatment/Activity Tolerance:  [x] Patient tolerated treatment well [] Patient limited by fatique  [] Patient limited by pain  [] Patient limited by other medical complications  [] Other:     Overall Progression Towards Functional goals/ Treatment Progress Update:  [] Patient is progressing as expected towards functional goals listed. [x] Progression is slowed due to complexities/Impairments listed. [] Progression has been slowed due to co-morbidities. [] Plan just implemented, too soon to assess goals progression <30days   [] Goals require adjustment due to lack of progress  [] Patient is not progressing as expected and requires additional follow up with physician  [] Other    Prognosis for POC: [x] Good [] Fair  [] Poor    Patient requires continued skilled intervention: [x] Yes  [] No        PLAN:  s ADD to [address]. Stabilization w/ movement to avoid symptoms w/ turning head    Cont. As above , add nerve gliding, build postural muscle strength and awareness , centralize symptoms   [] Continue per plan of care [] Alter current plan (see comments)  [x] Plan of care initiated [] Hold pending MD visit [] Discharge    Electronically signed by: Keri Michelle PTA    Note: If patient does not return for scheduled/recommended follow up visits, this note will serve as a discharge from care along with the most recent update on progress.

## 2022-10-18 ENCOUNTER — HOSPITAL ENCOUNTER (OUTPATIENT)
Dept: PHYSICAL THERAPY | Age: 67
Setting detail: THERAPIES SERIES
Discharge: HOME | End: 2022-10-18
Payer: MEDICARE

## 2022-10-18 PROCEDURE — 97012 MECHANICAL TRACTION THERAPY: CPT

## 2022-10-18 PROCEDURE — 97112 NEUROMUSCULAR REEDUCATION: CPT

## 2022-10-18 PROCEDURE — 97140 MANUAL THERAPY 1/> REGIONS: CPT

## 2022-10-18 NOTE — FLOWSHEET NOTE
[address] [facility] [address] [place], [place] [address]  Phone: [phone]   Fax:     [phone]    Physical Therapy Treatment Note/ Progress Report:     Date:  10/18/2022    Patient Name:  Torin Lopez    :  1955  MRN: [id number]    Pertinent Medical History:      Medical/Treatment Diagnosis Information:  Medical Diagnosis: Cervical stenosis of spine [M48.02]  Treatment Diagnosis: cervical hypomobility , reduced tolerance to use of RUE , signs consisant with right cervical radiculopathy    Insurance/Certification information:  PT Insurance Information: [id number] [employer]. MEDICARE  Physician Information:  Brittney Harrison MD  Plan of care signed (Y/N): routed 9/15    Date of Patient follow up with Physician:      Progress Report: []  Yes  [x]  No     Date Range for reporting period:  Beginning: 10/18/2022  Ending:     Progress report due (10 Rx/or 30 days whichever is less):      Recertification due (POC duration/ or 90 days whichever is less):      Visit # Insurance/POC Allowable Auth Needed    Clarene Wei / NO DED / $40 COPAY / [place]ong / []Yes    [x]No     Functional Outcomes Measure:    Date Assessed: at eval=55  Test: FOTO  Score:    Pain level:  1-2/10     HISTORY OF INJURY:Patient stated complaint: Insidious onset of right arm pain about 3 weeks ago after waking, denies injury and previous h/o, works full time as an  in a [employer] firm, activities include music  play [employer] , c/o constant pain right shoulder blade pain that radiates to elbow/ forearm, numbness to thumb and 2nd/ 3rd digits , decreased pain post oral steroids and acupuncture,  Decreased sx's w/ heat lying on left side, avoid lifting,  Increased sx's w/ lifting, using mouse at RUE  X ray from 22   FINDINGS:   There is no fracture, subluxation or osseous destruction.   Mild disc space   narrowing and endplate osteophyte formation is present at the C5/6 and C6/7   levels. Prevertebral soft tissues are within normal limits. The lung apices   are clear. SUBJECTIVE:  09/20/22: Patient reports numbness still about the same in the thumb and 2nd and 3rd digits. States some soreness in right shoulder blade area after playing in a festival over the weekend. 9/22/22 Pt reports increased pain and numbness at neck, right shoulder and UE/ hand  from Tuesday to present. Not sure why. 10/4/22 Pt reports decreased pain but his T&N are about the same. 10/6/22 Pt reports feeling a little better than he did on Tuesday. He was able to practice Jamin chi and was not able to do so 2 weeks ago. 10/11/22 Patient reports right shoulder has been slightly sore last night. States numbness in his index and thumb has been about the same. 10/13/22: Patient reports numbness in his index and thumb is not as intense today. States minimal soreness in right shoulder. 10/18/22 Pt reports no pain just continues tingling and numbness. OBJECTIVE:   Observation:   Test measurements:    Cervical AROM Flexion Extension Lateral Flexion  Left Lateral Flex. Right Rotation Left Rotation Right    Eval.         10/6 52 34 33 33 51  61                                                   RESTRICTIONS/PRECAUTIONS:     Exercises/Interventions:   Therapeutic Ex (55260)  Min: 5 min Resistance/Repetitions Notes   Supine chin tuck    Scap retraction    UT stretch    Corner stretch 3 way  10 breath cycles ea                    HEP 10/6 reviewed from 9/22  Corrected tech. With tray and tensioner , pt was able to demonstrate proper tech. Following cues and additional practice    Manual Intervention (81018)  Min: 30     Cerv mobs/manip O gross cerv retraction gr 3-4             Man ctx decreased RUE symptoms temporary    Thoracic mobs/manip PA's T 1 to T 4 gr 4  UPA's C7 ,T 1 to T 6 bilat. CT manip     Rib mobilizations Bilat.  1 st and 2nd ribs gr 3     STM/IASTM Right UT's, scalenes     MET       Nerve glides Right median Nerve    Stretch  to Left and R scales , UT's and Lev scap. NMR re-education (22093)  Min: 10     Wall tucks  10 x 3     Corner lat push outs  10 x 3     T slide NS   W's Red 10 x 3                        Therapeutic Activity (08250)  Min:     Neutral spine 9/15 seated at computer and standing at computer , discussed and demonstrated to pt. Pt assumed NS and tolerated well                   Modalities  Min: 15     CTx 21/7# 60/20 x 15min           Other Therapeutic Activities:  9/15/22 Pt was educated on PT POC, Diagnosis, Prognosis, pathomechanics relative to cervical radiculopathy  as well as frequency and duration of scheduling future physical therapy appointments. Time was also taken on this day to answer all patient questions and participation in PT. Reviewed appointment policy in detail with patient and patient verbalized understanding. Home Exercise Program:  HEP instruction:   Access Code: [email or web address]  URL: [email or web address]  Date: 09/15/2022  Prepared by: Merline Barman     Exercises  Seated Cervical Retraction - 3-5 x daily - 7 x weekly - 2-3 sets - 10 reps - 2-3 hold    Access Code: [email or web address]  URL: [employer]/  Date: 09/22/2022  Prepared by: Merline Barman    Exercises  Median Nerve Flossing - Tray - 2 x daily - 7 x weekly - 1-2 sets - 10 reps - 3 hold  Median Nerve Tensioner - 2 x daily - 7 x weekly - 1-2 sets - 10 reps - 3 hold    The patient demonstrated good tolerance to and understanding of the HEP. Written instructions have been issued.      Therapeutic Exercise and NMR EXR  [] (83330) Provided verbal/tactile cueing for activities related to strengthening, flexibility, endurance, ROM  for improvements in cervical, postural, scapular, scapulothoracic and UE control with self care, reaching, carrying, lifting, house/yardwork, driving/computer work.    [] (32302) Provided verbal/tactile cueing for activities related to improving balance, coordination, kinesthetic sense, posture, motor skill, proprioception  to assist with cervical, scapular, scapulothoracic and UE control with self care, reaching, carrying, lifting, house/yardwork, driving/computer work. Therapeutic Activities:    [] (19297 or 57262) Provided verbal/tactile cueing for activities related to improving balance, coordination, kinesthetic sense, posture, motor skill, proprioception and motor activation to allow for proper function of cervical, scapular, scapulothoracic and UE control with self care, carrying, lifting, driving/computer work.      Home Exercise Program:    [] (92125) Reviewed/Progressed HEP activities related to strengthening, flexibility, endurance, ROM of cervical, scapular, scapulothoracic and UE control with self care, reaching, carrying, lifting, house/yardwork, driving/computer work  [] (52485) Reviewed/Progressed HEP activities related to improving balance, coordination, kinesthetic sense, posture, motor skill, proprioception of cervical, scapular, scapulothoracic and UE control with self care, reaching, carrying, lifting, house/yardwork, driving/computer work      Manual Treatments:  PROM / STM / Oscillations-Mobs:  G-I, II, III, IV (PA's, Inf., Post.)  [] (69894) Provided manual therapy to mobilize soft tissue/joints of cervical/CT, scapular GHJ and UE for the purpose of decreasing headache, modulating pain, promoting relaxation,  increasing ROM, reducing/eliminating soft tissue swelling/inflammation/restriction, improving soft tissue extensibility and allowing for proper ROM for normal function with self care, reaching, carrying, lifting, house/yardwork, driving/computer work    If [facility] Please Indicate Time In/Out  CPT Code Time in Time out                                   Approval Dates:  CPT Code Units Approved Units Used  Date Updated:                     Charges:  Timed Code Treatment Minutes: 60   Total Treatment Minutes: 65     [] KIMBERLY (LOW) 91476 (typically 20 minutes face-to-face)  [] EVAL (MOD) 39628 (typically 30 minutes face-to-face)  [] EVAL (HIGH) 57461 (typically 45 minutes face-to-face)  [] RE-EVAL     [] EK(63127) x     [] Dry needle 1 or 2 Muscles (42437)  [x] NMR (04221) x     [] Dry needle 3+ Muscles (92628)  [x] Manual (56802) x  2   [] Ultrasound (65987) x  [] TA (00169) x     [x] Mech Traction (10566)  [] ES(attended) (48982)     [] ES (un) (58319):   [] Vasopump (73476) [] Ionto (66318)   [] Other:    GOALS:  Patient stated goal: relieve numbness and pain   [] Progressing: [] Met: [] Not Met: [] Adjusted     Therapist goals for Patient:   Short Term Goals: To be achieved in: 2 weeks  1. Independent in HEP and progression per patient tolerance, in order to prevent re-injury. [] Progressing: [] Met: [] Not Met: [] Adjusted  2. Patient will have a decrease in pain 0-4/10 to facilitate improvement in movement, function, and ADLs as indicated by Functional Deficits. [] Progressing: [] Met: [] Not Met: [] Adjusted     Long Term Goals: To be achieved in: 4-6 weeks  1. Increase FOTO functional outcome score from 55 to 71  to assist with reaching prior level of function. [] Progressing: [] Met: [] Not Met: [] Adjusted  2. Patient will demonstrate increased AROM to [facility] of cervical/thoracic spine to allow for proper joint functioning as indicated by patients Functional Deficits. [] Progressing: [] Met: [] Not Met: [] Adjusted  3. Patient will demonstrate an increase in postural awareness and control and activation of  Deep cervical stabilizers to allow for proper functional mobility as indicated by patients Functional Deficits. [] Progressing: [] Met: [] Not Met: [] Adjusted  4. Patient will return to work related and playing music  functional activities without increased symptoms or restriction. [] Progressing: [] Met: [] Not Met: [] Adjusted  5.  Patient will have a decrease in pain 0-2/10 to facilitate improvement in movement, function, and ADLs as indicated by Functional Deficits. [] Progressing: [] Met: [] Not Met: [] Adjusted     ASSESSMENT:  10/11/22: Tolerated treatment well. Slight decrease of finger numbness after rx. Improving mid cervical mobility. Treatment/Activity Tolerance:  [x] Patient tolerated treatment well [] Patient limited by fatique  [] Patient limited by pain  [] Patient limited by other medical complications  [] Other:     Overall Progression Towards Functional goals/ Treatment Progress Update:  [] Patient is progressing as expected towards functional goals listed. [x] Progression is slowed due to complexities/Impairments listed. [] Progression has been slowed due to co-morbidities. [] Plan just implemented, too soon to assess goals progression <30days   [] Goals require adjustment due to lack of progress  [] Patient is not progressing as expected and requires additional follow up with physician  [] Other    Prognosis for POC: [x] Good [] Fair  [] Poor    Patient requires continued skilled intervention: [x] Yes  [] No        PLAN:  s ADD corner stretches , corner push outs and cerv. Stretches   Teach Cerv. Stabilization w/ movement to avoid symptoms w/ turning head    Cont. As above , add nerve gliding, build postural muscle strength and awareness , centralize symptoms   [] Continue per plan of care [] Alter current plan (see comments)  [x] Plan of care initiated [] Hold pending MD visit [] Discharge    Electronically signed by: Kinza Johnson PT    Note: If patient does not return for scheduled/recommended follow up visits, this note will serve as a discharge from care along with the most recent update on progress.

## 2022-10-20 ENCOUNTER — HOSPITAL ENCOUNTER (OUTPATIENT)
Dept: PHYSICAL THERAPY | Age: 67
Setting detail: THERAPIES SERIES
Discharge: HOME | End: 2022-10-20
Payer: MEDICARE

## 2022-10-20 PROCEDURE — 97110 THERAPEUTIC EXERCISES: CPT

## 2022-10-20 PROCEDURE — 97012 MECHANICAL TRACTION THERAPY: CPT

## 2022-10-20 PROCEDURE — 97140 MANUAL THERAPY 1/> REGIONS: CPT

## 2022-10-20 NOTE — FLOWSHEET NOTE
[address] [facility] [place]. [place] [address]  Phone: [phone]   Fax:     [phone]    Physical Therapy Treatment Note/ Progress Report:     Date:  10/20/2022    Patient Name:  Juan A Andino    :  1955  MRN: [id number]    Pertinent Medical History:      Medical/Treatment Diagnosis Information:  Medical Diagnosis: Cervical stenosis of spine [M48.02]  Treatment Diagnosis: cervical hypomobility , reduced tolerance to use of RUE , signs consisant with right cervical radiculopathy    Insurance/Certification information:  PT Insurance Information: [id number] [employer]. MEDICARE  Physician Information:  Patrice Albarran MD  Plan of care signed (Y/N): routed 9/15    Date of Patient follow up with Physician:      Progress Report: []  Yes  [x]  No     Date Range for reporting period:  Beginning: 10/20/2022  Ending:     Progress report due (10 Rx/or 30 days whichever is less):      Recertification due (POC duration/ or 90 days whichever is less):      Visit # Insurance/POC Allowable Auth Needed    Inder Pace / NO DED / $40 COPAY / Torsten Goodwin / []Yes    [x]No     Functional Outcomes Measure:    Date Assessed: at eval=55  Test: FOTO  Score:    Pain level:  1-2/10     HISTORY OF INJURY:Patient stated complaint: Insidious onset of right arm pain about 3 weeks ago after waking, denies injury and previous h/o, works full time as an  in a [employer] firm, activities include music  play [employer] , c/o constant pain right shoulder blade pain that radiates to elbow/ forearm, numbness to thumb and 2nd/ 3rd digits , decreased pain post oral steroids and acupuncture,  Decreased sx's w/ heat lying on left side, avoid lifting,  Increased sx's w/ lifting, using mouse at RUE  X ray from 22   FINDINGS:   There is no fracture, subluxation or osseous destruction.   Mild disc space   narrowing and endplate osteophyte formation is present at the C5/6 and C6/7   levels. Prevertebral soft tissues are within normal limits. The lung apices   are clear. SUBJECTIVE:  09/20/22: Patient reports numbness still about the same in the thumb and 2nd and 3rd digits. States some soreness in right shoulder blade area after playing in a festival over the weekend. 9/22/22 Pt reports increased pain and numbness at neck, right shoulder and UE/ hand  from Tuesday to present. Not sure why. 10/4/22 Pt reports decreased pain but his T&N are about the same. 10/6/22 Pt reports feeling a little better than he did on Tuesday. He was able to practice Jamin chi and was not able to do so 2 weeks ago. 10/11/22 Patient reports right shoulder has been slightly sore last night. States numbness in his index and thumb has been about the same. 10/13/22: Patient reports numbness in his index and thumb is not as intense today. States minimal soreness in right shoulder. 10/18/22 Pt reports no pain just continues tingling and numbness. 10/20/22 Pt reports no pain but right hand numbness persists. OBJECTIVE:   Observation:   Test measurements:    Cervical AROM Flexion Extension Lateral Flexion  Left Lateral Flex. Right Rotation Left Rotation Right    Eval.         10/6 52 34 33 33 51  61                                                   RESTRICTIONS/PRECAUTIONS:     Exercises/Interventions:   Therapeutic Ex (88157)  Min: 10 min Resistance/Repetitions Notes   Supine chin tuck    Scap retraction    UT stretch    Corner stretch 3 way  10 breath cycles ea                    HEP 10/20 added      Manual Intervention (08285)  Min: 15     Cerv mobs/manip O gross cerv retraction gr 3-4             Man ctx decreased RUE symptoms temporary    Thoracic mobs/manip    CT manip     Rib mobilizations Bilat. 1 st and 2nd ribs gr 3     STM/Right UT's, scalenes     MET       Nerve glides    Stretch  to Left and R scales , UT's and Lev scap.           NMR re-education (12437)  Min: 8     Wall tucks  10 x 3     Corner lat push outs  10 x 3     T slide NS                          Therapeutic Activity (03266)  Min:     Neutral spine 9/15 seated at computer and standing at computer , discussed and demonstrated to pt. Pt assumed NS and tolerated well                   Modalities  Min: 15     CTx 23/7# 60/20 x 15min           Other Therapeutic Activities:  9/15/22 Pt was educated on PT POC, Diagnosis, Prognosis, pathomechanics relative to cervical radiculopathy  as well as frequency and duration of scheduling future physical therapy appointments. Time was also taken on this day to answer all patient questions and participation in PT. Reviewed appointment policy in detail with patient and patient verbalized understanding. Home Exercise Program:  HEP instruction:   Access Code: [email or web address]  URL: [email or web address]  Date: 09/15/2022  Prepared by: Alberto Almonte     Exercises  Seated Cervical Retraction - 3-5 x daily - 7 x weekly - 2-3 sets - 10 reps - 2-3 hold    Access Code: [email or web address]  URL: [employer]/  Date: 09/22/2022  Prepared by: Alberto Almonte    Exercises  Median Nerve Flossing - Tray - 2 x daily - 7 x weekly - 1-2 sets - 10 reps - 3 hold  Median Nerve Tensioner - 2 x daily - 7 x weekly - 1-2 sets - 10 reps - 3 hold    Access Code: [email or web address]  URL: [employer]/  Date: 10/20/2022  Prepared by: Alberto Allena    Exercises  Corner Pec Minor Stretch - 1 x daily - 7 x weekly - 1 sets - 1-2 reps - 30 hold  Corner Pec Major Stretch - 1 x daily - 7 x weekly - 1 sets - 1-2 reps - 30 hold  Doorway Pec Stretch at 120 Degrees Abduction - 1 x daily - 7 x weekly - 1 sets - 1-2 reps - 30 hold  Corner Mid Trap Push-Outs - 1 x daily - 7 x weekly - 1-3 sets - 15 reps - 5 hold  Cervical Retraction at Wall - 1 x daily - 7 x weekly - 1-3 sets - 10 reps - 3-5 hold    The patient demonstrated good tolerance to and understanding of the HEP.  Written instructions have been issued. Therapeutic Exercise and NMR EXR  [] (54801) Provided verbal/tactile cueing for activities related to strengthening, flexibility, endurance, ROM  for improvements in cervical, postural, scapular, scapulothoracic and UE control with self care, reaching, carrying, lifting, house/yardwork, driving/computer work.    [] (46798) Provided verbal/tactile cueing for activities related to improving balance, coordination, kinesthetic sense, posture, motor skill, proprioception  to assist with cervical, scapular, scapulothoracic and UE control with self care, reaching, carrying, lifting, house/yardwork, driving/computer work. Therapeutic Activities:    [] (05503 or 06400) Provided verbal/tactile cueing for activities related to improving balance, coordination, kinesthetic sense, posture, motor skill, proprioception and motor activation to allow for proper function of cervical, scapular, scapulothoracic and UE control with self care, carrying, lifting, driving/computer work.      Home Exercise Program:    [] (02669) Reviewed/Progressed HEP activities related to strengthening, flexibility, endurance, ROM of cervical, scapular, scapulothoracic and UE control with self care, reaching, carrying, lifting, house/yardwork, driving/computer work  [] (47241) Reviewed/Progressed HEP activities related to improving balance, coordination, kinesthetic sense, posture, motor skill, proprioception of cervical, scapular, scapulothoracic and UE control with self care, reaching, carrying, lifting, house/yardwork, driving/computer work      Manual Treatments:  PROM / STM / Oscillations-Mobs:  G-I, II, III, IV (PA's, Inf., Post.)  [] (60321) Provided manual therapy to mobilize soft tissue/joints of cervical/CT, scapular GHJ and UE for the purpose of decreasing headache, modulating pain, promoting relaxation,  increasing ROM, reducing/eliminating soft tissue swelling/inflammation/restriction, improving soft tissue extensibility and allowing for proper ROM for normal function with self care, reaching, carrying, lifting, house/yardwork, driving/computer work    If [facility] Please Indicate Time In/Out  CPT Code Time in Time out                                   Approval Dates:  CPT Code Units Approved Units Used  Date Updated:                     Charges:  Timed Code Treatment Minutes: 48   Total Treatment Minutes: 50     [] EVAL (LOW) 12747 (typically 20 minutes face-to-face)  [] EVAL (MOD) 18631 (typically 30 minutes face-to-face)  [] EVAL (HIGH) 31140 (typically 45 minutes face-to-face)  [] RE-EVAL     [] ZL(16241) x     [] Dry needle 1 or 2 Muscles (79371)  [x] NMR (62117) x     [] Dry needle 3+ Muscles (68388)  [x] Manual (89276) x    [] Ultrasound (53788) x  [] TA (83737) x     [x] Mech Traction (97881)  [] ES(attended) (48846)     [] ES (un) (88780):   [] Vasopump (24488) [] Ionto (34144)   [] Other:    GOALS:  Patient stated goal: relieve numbness and pain   [] Progressing: [] Met: [] Not Met: [] Adjusted     Therapist goals for Patient:   Short Term Goals: To be achieved in: 2 weeks  1. Independent in HEP and progression per patient tolerance, in order to prevent re-injury. [] Progressing: [] Met: [] Not Met: [] Adjusted  2. Patient will have a decrease in pain 0-4/10 to facilitate improvement in movement, function, and ADLs as indicated by Functional Deficits. [] Progressing: [] Met: [] Not Met: [] Adjusted     Long Term Goals: To be achieved in: 4-6 weeks  1. Increase FOTO functional outcome score from 55 to 71  to assist with reaching prior level of function. [] Progressing: [] Met: [] Not Met: [] Adjusted  2. Patient will demonstrate increased AROM to [facility] of cervical/thoracic spine to allow for proper joint functioning as indicated by patients Functional Deficits. [] Progressing: [] Met: [] Not Met: [] Adjusted  3.  Patient will demonstrate an increase in postural awareness and control and activation of  Deep cervical stabilizers to allow for proper functional mobility as indicated by patients Functional Deficits. [] Progressing: [] Met: [] Not Met: [] Adjusted  4. Patient will return to work related and playing music  functional activities without increased symptoms or restriction. [] Progressing: [] Met: [] Not Met: [] Adjusted  5. Patient will have a decrease in pain 0-2/10 to facilitate improvement in movement, function, and ADLs as indicated by Functional Deficits. [] Progressing: [] Met: [] Not Met: [] Adjusted     ASSESSMENT:  10/11/22: Tolerated treatment well. Slight decrease of finger numbness after rx. Improving mid cervical mobility. Treatment/Activity Tolerance:  [x] Patient tolerated treatment well [] Patient limited by fatique  [] Patient limited by pain  [] Patient limited by other medical complications  [] Other:     Overall Progression Towards Functional goals/ Treatment Progress Update:  [] Patient is progressing as expected towards functional goals listed. [x] Progression is slowed due to complexities/Impairments listed. [] Progression has been slowed due to co-morbidities. [] Plan just implemented, too soon to assess goals progression <30days   [] Goals require adjustment due to lack of progress  [] Patient is not progressing as expected and requires additional follow up with physician  [] Other    Prognosis for POC: [x] Good [] Fair  [] Poor    Patient requires continued skilled intervention: [x] Yes  [] No        PLAN: FOTO due add lateral cerv. Stretches scalenes, UT's and Lev scap to HEP. Reassess   Cont.  As above , add nerve gliding, build postural muscle strength and awareness , centralize symptoms   [] Continue per plan of care [] Alter current plan (see comments)  [x] Plan of care initiated [] Hold pending MD visit [] Discharge    Electronically signed by: Román Ferrell PT    Note: If patient does not return for scheduled/recommended follow up visits, this note will serve as a discharge from care along with the most recent update on progress.

## 2022-10-24 ENCOUNTER — HOSPITAL ENCOUNTER (OUTPATIENT)
Dept: PHYSICAL THERAPY | Age: 67
Setting detail: THERAPIES SERIES
Discharge: HOME | End: 2022-10-24
Payer: MEDICARE

## 2022-10-24 PROCEDURE — 97012 MECHANICAL TRACTION THERAPY: CPT

## 2022-10-24 PROCEDURE — 97112 NEUROMUSCULAR REEDUCATION: CPT

## 2022-10-24 PROCEDURE — 97140 MANUAL THERAPY 1/> REGIONS: CPT

## 2022-10-24 NOTE — FLOWSHEET NOTE
[address] [facility] [address]  Phone: [phone]   Fax:     [phone]    Physical Therapy Treatment Note/ Progress Report:     Date:  10/24/2022    Patient Name:  Mc Green    :  1955  MRN: [id number]    Pertinent Medical History:      Medical/Treatment Diagnosis Information:  Medical Diagnosis: Cervical stenosis of spine [M48.02]  Treatment Diagnosis: cervical hypomobility , reduced tolerance to use of RUE , signs consisant with right cervical radiculopathy    Insurance/Certification information:  PT Insurance Information: [id number] [address] [employer] MEDICARE  Physician Information:  Judie East MD  Plan of care signed (Y/N): routed 9/15    Date of Patient follow up with Physician:      Progress Report: []  Yes  [x]  No     Date Range for reporting period:  Beginning: 10/24/2022  Ending:     Progress report due (10 Rx/or 30 days whichever is less):      Recertification due (POC duration/ or 90 days whichever is less):      Visit # Insurance/POC Allowable Auth Needed   10 /12 Lissette Delvalle / NO DED / $40 Joesph Bernal / Jonnie De La Cruz / []Yes    [x]No     Functional Outcomes Measure:    Date Assessed: at eval=55  Test: FOTO  Score:68 at 10/24/22    Pain level:  1-2/10     HISTORY OF INJURY:Patient stated complaint: Insidious onset of right arm pain about 3 weeks ago after waking, denies injury and previous h/o, works full time as an  in a [employer] firm, activities include music  play [employer] , c/o constant pain right shoulder blade pain that radiates to elbow/ forearm, numbness to thumb and 2nd/ 3rd digits , decreased pain post oral steroids and acupuncture,  Decreased sx's w/ heat lying on left side, avoid lifting,  Increased sx's w/ lifting, using mouse at RUE  X ray from 22   FINDINGS:   There is no fracture, subluxation or osseous destruction.   Mild disc space   narrowing and endplate osteophyte formation is present at the C5/6 and C6/7   levels. Prevertebral soft tissues are within normal limits. The lung apices   are clear. SUBJECTIVE:  09/20/22: Patient reports numbness still about the same in the thumb and 2nd and 3rd digits. States some soreness in right shoulder blade area after playing in a festival over the weekend. 9/22/22 Pt reports increased pain and numbness at neck, right shoulder and UE/ hand  from Tuesday to present. Not sure why. 10/4/22 Pt reports decreased pain but his T&N are about the same. 10/6/22 Pt reports feeling a little better than he did on Tuesday. He was able to practice Jamin chi and was not able to do so 2 weeks ago. 10/11/22 Patient reports right shoulder has been slightly sore last night. States numbness in his index and thumb has been about the same. 10/13/22: Patient reports numbness in his index and thumb is not as intense today. States minimal soreness in right shoulder. 10/18/22 Pt reports no pain just continues tingling and numbness. 10/20/22 Pt reports no pain but right hand numbness persists. 10/24/22 Patient reports numbness only minimal and only on the tip of his fingers. States     OBJECTIVE:   Observation:   Test measurements:    Cervical AROM Flexion Extension Lateral Flexion  Left Lateral Flex. Right Rotation Left Rotation Right    Eval.         10/6 52 34 33 33 51  61                                                   RESTRICTIONS/PRECAUTIONS:     Exercises/Interventions:   Therapeutic Ex (51709)  Min: 10 min Resistance/Repetitions Notes   Supine chin tuck    Scap retraction    UT stretch    Corner stretch 3 way  10 breath cycles ea                    HEP 10/20 added      Manual Intervention (04445)  Min: 15     Cerv mobs/manip O gross cerv retraction gr 3-4             Man ctx decreased RUE symptoms temporary    Thoracic mobs/manip PA's T 1 to T 4 gr 4  UPA's C7 ,T 1 to T 6 bilat. CT manip     Rib mobilizations Bilat.  1 st and 2nd ribs gr 3     STM/Right UT's, scalenes     MET       Nerve glides    Stretch  to Left and R scales , UT's and Lev scap. NMR re-education (32114)  Min: 8     Wall tucks  10 x 3     Corner lat push outs  10 x 3     T slide NS   Rows Blue 10 x 3                         Therapeutic Activity (10730)  Min:     Neutral spine 9/15 seated at computer and standing at computer , discussed and demonstrated to pt. Pt assumed NS and tolerated well                   Modalities  Min: 15     CTx 23/7# 60/20 x 15min           Other Therapeutic Activities:  9/15/22 Pt was educated on PT POC, Diagnosis, Prognosis, pathomechanics relative to cervical radiculopathy  as well as frequency and duration of scheduling future physical therapy appointments. Time was also taken on this day to answer all patient questions and participation in PT. Reviewed appointment policy in detail with patient and patient verbalized understanding. Home Exercise Program:  HEP instruction:   Access Code: [email or web address]  URL: [email or web address]  Date: 09/15/2022  Prepared by: [employer]     Exercises  Seated Cervical Retraction - 3-5 x daily - 7 x weekly - 2-3 sets - 10 reps - 2-3 hold    Access Code: [email or web address]  URL: [email or web address]  Date: 09/22/2022  Prepared by: [employer]    Exercises  Median Nerve Flossing - Tray - 2 x daily - 7 x weekly - 1-2 sets - 10 reps - 3 hold  Median Nerve Tensioner - 2 x daily - 7 x weekly - 1-2 sets - 10 reps - 3 hold    Access Code: [email or web address]  URL: [email or web address]  Date: 10/20/2022  Prepared by: [employer]    Exercises  Corner Pec Minor Stretch - 1 x daily - 7 x weekly - 1 sets - 1-2 reps - 30 hold  Corner Pec Major Stretch - 1 x daily - 7 x weekly - 1 sets - 1-2 reps - 30 hold  Doorway Pec Stretch at 120 Degrees Abduction - 1 x daily - 7 x weekly - 1 sets - 1-2 reps - 30 hold  Corner Mid Trap Push-Outs - 1 x daily - 7 x weekly - 1-3 sets - 15 reps - 5 hold  Cervical Retraction at Wall - 1 x daily - 7 x weekly - 1-3 sets - 10 reps - 3-5 hold    The patient demonstrated good tolerance to and understanding of the HEP. Written instructions have been issued. Therapeutic Exercise and NMR EXR  [] (94403) Provided verbal/tactile cueing for activities related to strengthening, flexibility, endurance, ROM  for improvements in cervical, postural, scapular, scapulothoracic and UE control with self care, reaching, carrying, lifting, house/yardwork, driving/computer work.    [] (34678) Provided verbal/tactile cueing for activities related to improving balance, coordination, kinesthetic sense, posture, motor skill, proprioception  to assist with cervical, scapular, scapulothoracic and UE control with self care, reaching, carrying, lifting, house/yardwork, driving/computer work. Therapeutic Activities:    [] (89432 or 82142) Provided verbal/tactile cueing for activities related to improving balance, coordination, kinesthetic sense, posture, motor skill, proprioception and motor activation to allow for proper function of cervical, scapular, scapulothoracic and UE control with self care, carrying, lifting, driving/computer work.      Home Exercise Program:    [] (43469) Reviewed/Progressed HEP activities related to strengthening, flexibility, endurance, ROM of cervical, scapular, scapulothoracic and UE control with self care, reaching, carrying, lifting, house/yardwork, driving/computer work  [] (38431) Reviewed/Progressed HEP activities related to improving balance, coordination, kinesthetic sense, posture, motor skill, proprioception of cervical, scapular, scapulothoracic and UE control with self care, reaching, carrying, lifting, house/yardwork, driving/computer work      Manual Treatments:  PROM / STM / Oscillations-Mobs:  G-I, II, III, IV (PA's, Inf., Post.)  [] (46712) Provided manual therapy to mobilize soft tissue/joints of cervical/CT, scapular GHJ and UE for the purpose of decreasing headache, modulating pain, promoting relaxation,  increasing ROM, reducing/eliminating soft tissue swelling/inflammation/restriction, improving soft tissue extensibility and allowing for proper ROM for normal function with self care, reaching, carrying, lifting, house/yardwork, driving/computer work    If Michelet Tolentino Please Indicate Time In/Out  CPT Code Time in Time out                                   Approval Dates:  CPT Code Units Approved Units Used  Date Updated:                     Charges:  Timed Code Treatment Minutes: 48   Total Treatment Minutes: 50     [] EVAL (LOW) 46045 (typically 20 minutes face-to-face)  [] EVAL (MOD) 06134 (typically 30 minutes face-to-face)  [] EVAL (HIGH) 16575 (typically 45 minutes face-to-face)  [] RE-EVAL     [] BU(21288) x     [] Dry needle 1 or 2 Muscles (10841)  [x] NMR (01793) x     [] Dry needle 3+ Muscles (39562)  [x] Manual (42630) x    [] Ultrasound (96833) x  [] TA (32590) x     [x] Mech Traction (05872)  [] ES(attended) (80579)     [] ES (un) (78059):   [] Vasopump (27426) [] Ionto (21665)   [] Other:    GOALS:  Patient stated goal: relieve numbness and pain   [] Progressing: [] Met: [] Not Met: [] Adjusted     Therapist goals for Patient:   Short Term Goals: To be achieved in: 2 weeks  1. Independent in HEP and progression per patient tolerance, in order to prevent re-injury. [] Progressing: [x] Met: [] Not Met: [] Adjusted  2. Patient will have a decrease in pain 0-4/10 to facilitate improvement in movement, function, and ADLs as indicated by Functional Deficits. [] Progressing: [x] Met: [] Not Met: [] Adjusted     Long Term Goals: To be achieved in: 4-6 weeks  1. Increase FOTO functional outcome score from 55 to 71  to assist with reaching prior level of function. [] Progressing: [x] Met: [] Not Met: [] Adjusted  2.  Patient will demonstrate increased AROM to [facility] of cervical/thoracic spine to allow for proper joint functioning as indicated by patients Functional Deficits. [x] Progressing: [] Met: [] Not Met: [] Adjusted  3. Patient will demonstrate an increase in postural awareness and control and activation of  Deep cervical stabilizers to allow for proper functional mobility as indicated by patients Functional Deficits. [] Progressing: [] Met: [] Not Met: [] Adjusted  4. Patient will return to work related and playing music  functional activities without increased symptoms or restriction. [x] Progressing: [] Met: [] Not Met: [] Adjusted  5. Patient will have a decrease in pain 0-2/10 to facilitate improvement in movement, function, and ADLs as indicated by Functional Deficits. [x] Progressing: [] Met: [] Not Met: [] Adjusted     ASSESSMENT:  10/11/22: Tolerated treatment well. Slight decrease of finger numbness after rx. Improving mid cervical mobility. Treatment/Activity Tolerance:  [x] Patient tolerated treatment well [] Patient limited by fatique  [] Patient limited by pain  [] Patient limited by other medical complications  [] Other:     Overall Progression Towards Functional goals/ Treatment Progress Update:  [] Patient is progressing as expected towards functional goals listed. [x] Progression is slowed due to complexities/Impairments listed. [] Progression has been slowed due to co-morbidities. [] Plan just implemented, too soon to assess goals progression <30days   [] Goals require adjustment due to lack of progress  [] Patient is not progressing as expected and requires additional follow up with physician  [] Other    Prognosis for POC: [x] Good [] Fair  [] Poor    Patient requires continued skilled intervention: [x] Yes  [] No        PLAN: FOTO due add lateral cerv. Stretches scalenes, UT's and Lev scap to HEP. Reassess   Cont.  As above , add nerve gliding, build postural muscle strength and awareness , centralize symptoms   [] Continue per plan of care [] Alter current plan (see comments)  [x] Plan of care initiated [] Hold pending MD visit [] Discharge    Electronically signed by: Vivi Sepulveda PTA    Note: If patient does not return for scheduled/recommended follow up visits, this note will serve as a discharge from care along with the most recent update on progress.

## 2022-10-27 ENCOUNTER — HOSPITAL ENCOUNTER (OUTPATIENT)
Dept: PHYSICAL THERAPY | Age: 67
Setting detail: THERAPIES SERIES
Discharge: HOME | End: 2022-10-27
Payer: MEDICARE

## 2022-10-27 PROCEDURE — 97140 MANUAL THERAPY 1/> REGIONS: CPT

## 2022-10-27 PROCEDURE — 97012 MECHANICAL TRACTION THERAPY: CPT

## 2022-10-27 PROCEDURE — 97530 THERAPEUTIC ACTIVITIES: CPT

## 2022-10-27 NOTE — FLOWSHEET NOTE
[address] [address]  Phone: [phone]   Fax:     [phone]    Physical Therapy Treatment Note/ Progress Report:     Date:  10/27/2022    Patient Name:  Johan Andrea    :  1955  MRN: [id number]    Pertinent Medical History:      Medical/Treatment Diagnosis Information:  Medical Diagnosis: Cervical stenosis of spine [M48.02]  Treatment Diagnosis: cervical hypomobility , reduced tolerance to use of RUE , signs consisant with right cervical radiculopathy    Insurance/Certification information:  PT Insurance Information: [id number] [address] [employer] MEDICARE  Physician Information:  Theresa Gordon MD  Plan of care signed (Y/N): routed 9/15    Date of Patient follow up with Physician:      Progress Report: []  Yes  [x]  No     Date Range for reporting period:  Beginning: 10/27/2022  Ending:     Progress report due (10 Rx/or 30 days whichever is less):      Recertification due (POC duration/ or 90 days whichever is less):      Visit # Insurance/POC Allowable Auth Needed    Melissa Muta / NO DED / $40 Cassia Hylan / Verdis Dines / []Yes    [x]No     Functional Outcomes Measure:    Date Assessed: at eval=55  Test: FOTO  Score:68 at 10/24/22    Pain level:  1-2/10     HISTORY OF INJURY:Patient stated complaint: Insidious onset of right arm pain about 3 weeks ago after waking, denies injury and previous h/o, works full time as an  in a [employer] firm, activities include music  play [employer] , c/o constant pain right shoulder blade pain that radiates to elbow/ forearm, numbness to thumb and 2nd/ 3rd digits , decreased pain post oral steroids and acupuncture,  Decreased sx's w/ heat lying on left side, avoid lifting,  Increased sx's w/ lifting, using mouse at RUE  X ray from 22   FINDINGS:   There is no fracture, subluxation or osseous destruction.   Mild disc space   narrowing and endplate osteophyte formation is present at the C5/6 and C6/7   levels. Prevertebral soft tissues are within normal limits. The lung apices   are clear. SUBJECTIVE:  09/20/22: Patient reports numbness still about the same in the thumb and 2nd and 3rd digits. States some soreness in right shoulder blade area after playing in a festival over the weekend. 9/22/22 Pt reports increased pain and numbness at neck, right shoulder and UE/ hand  from Tuesday to present. Not sure why. 10/4/22 Pt reports decreased pain but his T&N are about the same. 10/6/22 Pt reports feeling a little better than he did on Tuesday. He was able to practice Jamin chi and was not able to do so 2 weeks ago. 10/11/22 Patient reports right shoulder has been slightly sore last night. States numbness in his index and thumb has been about the same. 10/13/22: Patient reports numbness in his index and thumb is not as intense today. States minimal soreness in right shoulder. 10/18/22 Pt reports no pain just continues tingling and numbness. 10/20/22 Pt reports no pain but right hand numbness persists. 10/24/22 Patient reports numbness only minimal and only on the tip of his fingers. States   10/26/22 Patient reports only minimal symptoms. Numbness in right hand is not as wide spread and not as intense. He feels it only at the tip of his right thumb and tip of first finger. OBJECTIVE:   Observation:   Test measurements:    Cervical AROM Flexion Extension Lateral Flexion  Left Lateral Flex. Right Rotation Left Rotation Right    Eval.         10/6 52 34 33 33 51  61   10/27 53 40 35 31 62 62                                          RESTRICTIONS/PRECAUTIONS:     Exercises/Interventions:   Therapeutic Ex (34776)  Min:  Resistance/Repetitions Notes   Supine chin tuck    Scap retraction    UT stretch    Corner stretch 3 way  10 breath cycles ea                    HEP 10/20 added      Manual Intervention (56554)  Min: 30     Cerv mobs/manip O gross cerv retraction gr 3-4   Supine retraction assist 10 x 4             Man ctx decreased RUE symptoms temporary    Thoracic mobs/manip    CT manip     Rib mobilizations Bilat. 1 st  ribs gr 3     STM/Right UT's, scalenes and dorsal pcm's     MET       Nerve glides Right median Nerve    Stretch  to Left and R scalenes , UT's and Lev scap. NMR re-education (10271)  Min:      Wall tucks     Corner lat push outs     T slide NS                          Therapeutic Activity (80538)  Min:10     Neutral spine 10/28 reviewed seated at computer , advised pt that such posture can help to open IVF space for cervical nerve roots demonstrated to pt. Pt assumed NS and tolerated well    Home cerv. Traction  10/28 Discussed and patient was shown supine and seated door home cervical traction units on his smart phone,  he was encouraged to consider for home use to help minimize his nerve pain and maintain cervical mobility once he is DC'd from PT , he verbalized understanding and all questions were answered               Modalities  Min: 15     CTx 23/7# 60/20 x 15min           Other Therapeutic Activities:  9/15/22 Pt was educated on PT POC, Diagnosis, Prognosis, pathomechanics relative to cervical radiculopathy  as well as frequency and duration of scheduling future physical therapy appointments. Time was also taken on this day to answer all patient questions and participation in PT. Reviewed appointment policy in detail with patient and patient verbalized understanding. Home Exercise Program:  HEP instruction:   Access Code: [email or web address]  URL: [email or web address]  Date: 09/15/2022  Prepared by: Loren Player     Exercises  Seated Cervical Retraction - 3-5 x daily - 7 x weekly - 2-3 sets - 10 reps - 2-3 hold    Access Code: [email or web address]  URL: [email or web address]  Date: 09/22/2022  Prepared by: Loren Player    Exercises  Median Nerve Flossing - Tray - 2 x daily - 7 x weekly - 1-2 sets - 10 reps - 3 hold  Median Nerve Tensioner - 2 x daily - 7 x weekly - 1-2 sets - 10 reps - 3 hold    Access Code: [email or web address]  URL: [email or web address]  Date: 10/20/2022  Prepared by: Joselin Wang    Exercises  Corner Pec Minor Stretch - 1 x daily - 7 x weekly - 1 sets - 1-2 reps - 30 hold  Corner Pec Major Stretch - 1 x daily - 7 x weekly - 1 sets - 1-2 reps - 30 hold  Doorway Pec Stretch at 120 Degrees Abduction - 1 x daily - 7 x weekly - 1 sets - 1-2 reps - 30 hold  Corner Mid Trap Push-Outs - 1 x daily - 7 x weekly - 1-3 sets - 15 reps - 5 hold  Cervical Retraction at Wall - 1 x daily - 7 x weekly - 1-3 sets - 10 reps - 3-5 hold    The patient demonstrated good tolerance to and understanding of the HEP. Written instructions have been issued. Therapeutic Exercise and NMR EXR  [] (08290) Provided verbal/tactile cueing for activities related to strengthening, flexibility, endurance, ROM  for improvements in cervical, postural, scapular, scapulothoracic and UE control with self care, reaching, carrying, lifting, house/yardwork, driving/computer work.    [] (48179) Provided verbal/tactile cueing for activities related to improving balance, coordination, kinesthetic sense, posture, motor skill, proprioception  to assist with cervical, scapular, scapulothoracic and UE control with self care, reaching, carrying, lifting, house/yardwork, driving/computer work. Therapeutic Activities:    [] (66034 or 63407) Provided verbal/tactile cueing for activities related to improving balance, coordination, kinesthetic sense, posture, motor skill, proprioception and motor activation to allow for proper function of cervical, scapular, scapulothoracic and UE control with self care, carrying, lifting, driving/computer work.      Home Exercise Program:    [] (30036) Reviewed/Progressed HEP activities related to strengthening, flexibility, endurance, ROM of cervical, scapular, scapulothoracic and UE control with self care, reaching, carrying, lifting, house/yardwork, driving/computer work  [] (82697) Reviewed/Progressed HEP activities related to improving balance, coordination, kinesthetic sense, posture, motor skill, proprioception of cervical, scapular, scapulothoracic and UE control with self care, reaching, carrying, lifting, house/yardwork, driving/computer work      Manual Treatments:  PROM / STM / Oscillations-Mobs:  G-I, II, III, IV (PA's, Inf., Post.)  [] ([id number]) Provided manual therapy to mobilize soft tissue/joints of cervical/CT, scapular GHJ and UE for the purpose of decreasing headache, modulating pain, promoting relaxation,  increasing ROM, reducing/eliminating soft tissue swelling/inflammation/restriction, improving soft tissue extensibility and allowing for proper ROM for normal function with self care, reaching, carrying, lifting, house/yardwork, driving/computer work    If [facility] Please Indicate Time In/Out  CPT Code Time in Time out                                   Approval Dates:  CPT Code Units Approved Units Used  Date Updated:                     Charges:  Timed Code Treatment Minutes: 60   Total Treatment Minutes: 65     [] EVAL (LOW) 22455 (typically 20 minutes face-to-face)  [] EVAL (MOD) 23558 (typically 30 minutes face-to-face)  [] EVAL (HIGH) 26132 (typically 45 minutes face-to-face)  [] RE-EVAL     [] EW(16213) x     [] Dry needle 1 or 2 Muscles (95935)  [] NMR (66543) x     [] Dry needle 3+ Muscles (89226)  [x] Manual (87426) x2    [] Ultrasound (44308) x  [x] TA (75517) x     [x] Mech Traction (48811)  [] ES(attended) (01983)     [] ES (un) (89484):   [] Vasopump (97000) [] Ionto (78101)   [] Other:    GOALS:  Patient stated goal: relieve numbness and pain   [] Progressing: [] Met: [] Not Met: [] Adjusted     Therapist goals for Patient:   Short Term Goals: To be achieved in: 2 weeks  1. Independent in HEP and progression per patient tolerance, in order to prevent re-injury.    [] Progressing: [x] Met: [] Not Met: [] Adjusted  2. Patient will have a decrease in pain 0-4/10 to facilitate improvement in movement, function, and ADLs as indicated by Functional Deficits. [] Progressing: [x] Met: [] Not Met: [] Adjusted     Long Term Goals: To be achieved in: 4-6 weeks  1. Increase FOTO functional outcome score from 55 to 71  to assist with reaching prior level of function. [] Progressing: [x] Met: [] Not Met: [] Adjusted  2. Patient will demonstrate increased AROM to [facility] of cervical/thoracic spine to allow for proper joint functioning as indicated by patients Functional Deficits. [x] Progressing: [] Met: [] Not Met: [] Adjusted  3. Patient will demonstrate an increase in postural awareness and control and activation of  Deep cervical stabilizers to allow for proper functional mobility as indicated by patients Functional Deficits. [] Progressing: [] Met: [] Not Met: [] Adjusted  4. Patient will return to work related and playing music  functional activities without increased symptoms or restriction. [x] Progressing: [] Met: [] Not Met: [] Adjusted  5. Patient will have a decrease in pain 0-2/10 to facilitate improvement in movement, function, and ADLs as indicated by Functional Deficits. [x] Progressing: [] Met: [] Not Met: [] Adjusted     ASSESSMENT:  10/11/22: Tolerated treatment well. Slight decrease of finger numbness after rx. Improving mid cervical mobility. Treatment/Activity Tolerance:  [x] Patient tolerated treatment well [] Patient limited by fatique  [] Patient limited by pain  [] Patient limited by other medical complications  [] Other:     Overall Progression Towards Functional goals/ Treatment Progress Update:  [] Patient is progressing as expected towards functional goals listed. [x] Progression is slowed due to complexities/Impairments listed. [] Progression has been slowed due to co-morbidities.   [] Plan just implemented, too soon to assess goals progression <30days   [] Goals require adjustment due to lack of progress  [] Patient is not progressing as expected and requires additional follow up with physician  [] Other    Prognosis for POC: [x] Good [] Fair  [] Poor    Patient requires continued skilled intervention: [x] Yes  [] No        PLAN:  ADD lateral cerv. Stretches scalenes, UT's and Lev scap to HEP. Cont. As above , add nerve gliding, build postural muscle strength and awareness , centralize symptoms   [] Continue per plan of care [] Alter current plan (see comments)  [x] Plan of care initiated [] Hold pending MD visit [] Discharge    Electronically signed by: Sher Pedroza, PT    Note: If patient does not return for scheduled/recommended follow up visits, this note will serve as a discharge from care along with the most recent update on progress.

## 2022-10-28 ENCOUNTER — HOSPITAL ENCOUNTER (OUTPATIENT)
Dept: SLEEP CENTER | Age: 67
Discharge: HOME | End: 2022-10-28
Payer: MEDICARE

## 2022-10-28 DIAGNOSIS — G47.33 OSA (OBSTRUCTIVE SLEEP APNEA): ICD-10-CM

## 2022-10-28 PROCEDURE — 95811 POLYSOM 6/>YRS CPAP 4/> PARM: CPT

## 2022-10-31 ENCOUNTER — HOSPITAL ENCOUNTER (OUTPATIENT)
Dept: PHYSICAL THERAPY | Age: 67
Setting detail: THERAPIES SERIES
Discharge: HOME | End: 2022-10-31
Payer: MEDICARE

## 2022-10-31 PROCEDURE — 97012 MECHANICAL TRACTION THERAPY: CPT

## 2022-10-31 PROCEDURE — 97140 MANUAL THERAPY 1/> REGIONS: CPT

## 2022-10-31 NOTE — FLOWSHEET NOTE
[facility]. [address] [place] [address]  Phone: [phone]   Fax:     [phone]    Physical Therapy Treatment Note/ Progress Report:     Date:  10/31/2022    Patient Name:  Venkat Pires    :  1955  MRN: [id number]    Pertinent Medical History:      Medical/Treatment Diagnosis Information:  Medical Diagnosis: Cervical stenosis of spine [M48.02]  Treatment Diagnosis: cervical hypomobility , reduced tolerance to use of RUE , signs consisant with right cervical radiculopathy    Insurance/Certification information:  PT Insurance Information: [id number] MEDICARE  Physician Information:  Trisha Jensen MD  Plan of care signed (Y/N): routed 9/15    Date of Patient follow up with Physician:      Progress Report: []  Yes  [x]  No     Date Range for reporting period:  Beginning: 10/31/2022  Ending:     Progress report due (10 Rx/or 30 days whichever is less):      Recertification due (POC duration/ or 90 days whichever is less):      Visit # Insurance/POC Allowable Auth Needed   13 Jenna Mura / NO DED / $40 Darryle Manus / Ruby Mouna / []Yes    [x]No     Functional Outcomes Measure:    Date Assessed: at eval=55  Test: FOTO  Score:68 at 10/24/22    Pain level:  1/10     HISTORY OF INJURY:Patient stated complaint: Insidious onset of right arm pain about 3 weeks ago after waking, denies injury and previous h/o, works full time as an  in a [employer] firm, activities include music  play [employer] , c/o constant pain right shoulder blade pain that radiates to elbow/ forearm, numbness to thumb and 2nd/ 3rd digits , decreased pain post oral steroids and acupuncture,  Decreased sx's w/ heat lying on left side, avoid lifting,  Increased sx's w/ lifting, using mouse at RUE  X ray from 22   FINDINGS:   There is no fracture, subluxation or osseous destruction.   Mild disc space   narrowing and endplate osteophyte formation is present at the C5/6 and C6/7   levels. Prevertebral soft tissues are within normal limits. The lung apices   are clear. SUBJECTIVE:  09/20/22: Patient reports numbness still about the same in the thumb and 2nd and 3rd digits. States some soreness in right shoulder blade area after playing in a festival over the weekend. 9/22/22 Pt reports increased pain and numbness at neck, right shoulder and UE/ hand  from Tuesday to present. Not sure why. 10/4/22 Pt reports decreased pain but his T&N are about the same. 10/6/22 Pt reports feeling a little better than he did on Tuesday. He was able to practice Jamin chi and was not able to do so 2 weeks ago. 10/11/22 Patient reports right shoulder has been slightly sore last night. States numbness in his index and thumb has been about the same. 10/13/22: Patient reports numbness in his index and thumb is not as intense today. States minimal soreness in right shoulder. 10/18/22 Pt reports no pain just continues tingling and numbness. 10/20/22 Pt reports no pain but right hand numbness persists. 10/24/22 Patient reports numbness only minimal and only on the tip of his fingers. States   10/26/22 Patient reports only minimal symptoms. Numbness in right hand is not as wide spread and not as intense. He feels it only at the tip of his right thumb and tip of first finger. 10/31/22 Pat reports his tingling and numbness to continue to ease. OBJECTIVE:   Observation:   Test measurements:    Cervical AROM Flexion Extension Lateral Flexion  Left Lateral Flex. Right Rotation Left Rotation Right    Eval.         10/6 52 34 33 33 51  61   10/27 53 40 35 31 62 62                                          RESTRICTIONS/PRECAUTIONS:     Exercises/Interventions:   Therapeutic Ex (23327)  Min:  Resistance/Repetitions Notes   Supine chin tuck    Scap retraction    UT stretch    Corner stretch 3 way  1                  HEP 10/20 added      Manual Intervention (99685)  Min: 30     Cerv mobs/manip OA, AA flexion gr 3 C3-4 R rotation gr 3 gross cerv retraction gr 3-4   Supine retraction assist 10 x 4             Man ctx decreased RUE symptoms temporary    Thoracic mobs/manip    CT manip     Rib mobilizations Bilat. 1 st  ribs gr 3     STM/Right UT's, scalenes and dorsal pcm's     MET       Nerve glides Right median Nerve    Stretch  to Left and R scalenes , UT's and Lev scap. NMR re-education (31201)  Min:      Wall tucks     Corner lat push outs     T slide NS                          Therapeutic Activity (29809)  Min:     Neutral spine 10/28 reviewed seated at computer , advised pt that such posture can help to open IVF space for cervical nerve roots demonstrated to pt. Pt assumed NS and tolerated well    Home cerv. Traction  10/28 Discussed and patient was shown supine and seated door home cervical traction units on his smart phone,  he was encouraged to consider for home use to help minimize his nerve pain and maintain cervical mobility once he is DC'd from PT , he verbalized understanding and all questions were answered               Modalities  Min: 15     CTx 24/7# 60/20 x 15min           Other Therapeutic Activities:  9/15/22 Pt was educated on PT POC, Diagnosis, Prognosis, pathomechanics relative to cervical radiculopathy  as well as frequency and duration of scheduling future physical therapy appointments. Time was also taken on this day to answer all patient questions and participation in PT. Reviewed appointment policy in detail with patient and patient verbalized understanding. Home Exercise Program:  HEP instruction:   Access Code: [email or web address]  URL: [email or web address]  Date: 09/15/2022  Prepared by: Zenon Berry     Exercises  Seated Cervical Retraction - 3-5 x daily - 7 x weekly - 2-3 sets - 10 reps - 2-3 hold    Access Code: [email or web address]  URL: [email or web address]  Date: 09/22/2022  Prepared by: Zenon Borges  Median Nerve Flossing - Tray - 2 x daily - 7 x weekly - 1-2 sets - 10 reps - 3 hold  Median Nerve Tensioner - 2 x daily - 7 x weekly - 1-2 sets - 10 reps - 3 hold    Access Code: [email or web address]  URL: [email or web address]. com/  Date: 10/20/2022  Prepared by: Dewayne Yoder    Exercises  Corner Pec Minor Stretch - 1 x daily - 7 x weekly - 1 sets - 1-2 reps - 30 hold  Corner Pec Major Stretch - 1 x daily - 7 x weekly - 1 sets - 1-2 reps - 30 hold  Doorway Pec Stretch at 120 Degrees Abduction - 1 x daily - 7 x weekly - 1 sets - 1-2 reps - 30 hold  Corner Mid Trap Push-Outs - 1 x daily - 7 x weekly - 1-3 sets - 15 reps - 5 hold  Cervical Retraction at Wall - 1 x daily - 7 x weekly - 1-3 sets - 10 reps - 3-5 hold    The patient demonstrated good tolerance to and understanding of the HEP. Written instructions have been issued. Therapeutic Exercise and NMR EXR  [] (23630) Provided verbal/tactile cueing for activities related to strengthening, flexibility, endurance, ROM  for improvements in cervical, postural, scapular, scapulothoracic and UE control with self care, reaching, carrying, lifting, house/yardwork, driving/computer work.    [] (16231) Provided verbal/tactile cueing for activities related to improving balance, coordination, kinesthetic sense, posture, motor skill, proprioception  to assist with cervical, scapular, scapulothoracic and UE control with self care, reaching, carrying, lifting, house/yardwork, driving/computer work. Therapeutic Activities:    [] (09647 or 96635) Provided verbal/tactile cueing for activities related to improving balance, coordination, kinesthetic sense, posture, motor skill, proprioception and motor activation to allow for proper function of cervical, scapular, scapulothoracic and UE control with self care, carrying, lifting, driving/computer work.      Home Exercise Program:    [] (28497) Reviewed/Progressed HEP activities related to strengthening, flexibility, endurance, ROM of cervical, scapular, scapulothoracic and UE control with self care, reaching, carrying, lifting, house/yardwork, driving/computer work  [] (11855) Reviewed/Progressed HEP activities related to improving balance, coordination, kinesthetic sense, posture, motor skill, proprioception of cervical, scapular, scapulothoracic and UE control with self care, reaching, carrying, lifting, house/yardwork, driving/computer work      Manual Treatments:  PROM / STM / Oscillations-Mobs:  G-I, II, III, IV (PA's, Inf., Post.)  [] (59967) Provided manual therapy to mobilize soft tissue/joints of cervical/CT, scapular GHJ and UE for the purpose of decreasing headache, modulating pain, promoting relaxation,  increasing ROM, reducing/eliminating soft tissue swelling/inflammation/restriction, improving soft tissue extensibility and allowing for proper ROM for normal function with self care, reaching, carrying, lifting, house/yardwork, driving/computer work    If [facility] Please Indicate Time In/Out  CPT Code Time in Time out                                   Approval Dates:  CPT Code Units Approved Units Used  Date Updated:                     Charges:  Timed Code Treatment Minutes: 45   Total Treatment Minutes: 47     [] EVAL (LOW) 14026 (typically 20 minutes face-to-face)  [] EVAL (MOD) 75118 (typically 30 minutes face-to-face)  [] EVAL (HIGH) 65829 (typically 45 minutes face-to-face)  [] RE-EVAL     [] XJ(57692) x     [] Dry needle 1 or 2 Muscles (17398)  [] NMR (00256) x     [] Dry needle 3+ Muscles (68538)  [x] Manual (60541) x2    [] Ultrasound (53261) x  [] TA (39056) x     [x] Mech Traction (88970)  [] ES(attended) (49599)     [] ES (un) (27474):   [] Vasopump (75332) [] Ionto (03991)   [] Other:    GOALS:  Patient stated goal: relieve numbness and pain   [] Progressing: [] Met: [] Not Met: [] Adjusted     Therapist goals for Patient:   Short Term Goals: To be achieved in: 2 weeks  1.  Independent in HEP and progression per patient tolerance, in order to prevent re-injury. [] Progressing: [x] Met: [] Not Met: [] Adjusted  2. Patient will have a decrease in pain 0-4/10 to facilitate improvement in movement, function, and ADLs as indicated by Functional Deficits. [] Progressing: [x] Met: [] Not Met: [] Adjusted     Long Term Goals: To be achieved in: 4-6 weeks  1. Increase FOTO functional outcome score from 55 to 71  to assist with reaching prior level of function. [] Progressing: [x] Met: [] Not Met: [] Adjusted  2. Patient will demonstrate increased AROM to [facility] of cervical/thoracic spine to allow for proper joint functioning as indicated by patients Functional Deficits. [x] Progressing: [] Met: [] Not Met: [] Adjusted  3. Patient will demonstrate an increase in postural awareness and control and activation of  Deep cervical stabilizers to allow for proper functional mobility as indicated by patients Functional Deficits. [] Progressing: [] Met: [] Not Met: [] Adjusted  4. Patient will return to work related and playing music  functional activities without increased symptoms or restriction. [x] Progressing: [] Met: [] Not Met: [] Adjusted  5. Patient will have a decrease in pain 0-2/10 to facilitate improvement in movement, function, and ADLs as indicated by Functional Deficits. [x] Progressing: [] Met: [] Not Met: [] Adjusted     ASSESSMENT:  10/11/22: Tolerated treatment well. Slight decrease of finger numbness after rx. Improving mid cervical mobility. Treatment/Activity Tolerance:  [x] Patient tolerated treatment well [] Patient limited by fatique  [] Patient limited by pain  [] Patient limited by other medical complications  [] Other:     Overall Progression Towards Functional goals/ Treatment Progress Update:  [] Patient is progressing as expected towards functional goals listed. [x] Progression is slowed due to complexities/Impairments listed. [] Progression has been slowed due to co-morbidities.   [] Plan just implemented, too soon to assess goals progression <30days   [] Goals require adjustment due to lack of progress  [] Patient is not progressing as expected and requires additional follow up with physician  [] Other    Prognosis for POC: [x] Good [] Fair  [] Poor    Patient requires continued skilled intervention: [x] Yes  [] No        PLAN:  ADD lateral cerv. Stretches scalenes, UT's and Lev scap to HEP. Reassess   Cont. As above , add nerve gliding, build postural muscle strength and awareness , centralize symptoms   [] Continue per plan of care [] Alter current plan (see comments)  [x] Plan of care initiated [] Hold pending MD visit [] Discharge    Electronically signed by: Jose F Ozuna PT    Note: If patient does not return for scheduled/recommended follow up visits, this note will serve as a discharge from care along with the most recent update on progress.

## 2022-10-31 NOTE — PROGRESS NOTES
Rivka James         : 1955  [] [address]     [] [address]      [] Susan     [][facility]    [] Yo Areval[facility]  [] [facility]  [] [facility]   [] [facility] [] Other:  Diagnosis: [x] DONOVAN (G47.33) [] CSA (G47.31) [] Apnea (G47.30)   Length of Need: [] 12 Months [x] 99 Months [] Other:    Machine (DENICE!):  [x] ResMed AirSense     Auto [] Other:     [x]  CPAP () [] Bilevel ()   Mode: [x] Auto [] Spontaneous    Mode: [] Auto [] Spontaneous           7 cm                 Comfort Settings:   - Ramp Pressure: 5 cmH2O                                        - Ramp time: 15 min                                     -  Flex/EPR - 3 full time                                    - For ResMed Bilevel (TiMax-4 sec   TiMin- 0.2 sec)     Humidifier: [x] Heated ()        [x] Water chamber replacement ()/ 1 per 6 months        Mask:  Please always start with the mask the patient used during the titraion   [x] Nasal () /1 per 3 months [] Full Face () /1 per 3 months   [x] Patient choice -Size and fit mask [] Patient Choice - Size and fit mask   [] Dispense:   large Airfit P10 nasal pillows  [] Dispense:    [x] Headgear () / 1 per 3 months [] Headgear () / 1 per 3 months   [x] Replacement Nasal Cushion ()/2 per month [] Interface Replacement ()/1 per month   [x] Replacement Nasal Pillows ()/2 per month         Tubing: [x] Heated ()/1 per 3 months    [] Standard ()/1 per 3 months [] Other:           Filters: [x] Non-disposable ()/1 per 6 months     [x] Ultra-Fine, Disposable ()/2 per month        Miscellaneous: [x] Chin Strap ()/ 1 per 6 months [] O2 bleed-in:       LPM   [] Oximetry on CPAP/Bilevel []  Other:          Start Order Date: 10/31/22    MEDICAL JUSTIFICATION:  I, the undersigned, certify that the above prescribed supplies are medically necessary for this patients wellbeing.   In my opinion, the supplies are both reasonable and necessary in reference to accepted standards of medicalpractice in treatment of this patients condition.     Marrian Castleman, MD      NPI: [id number]       Order Signed Date: 10/31/22    Electronically signed by Marrian Castleman, MD on 10/31/2022 at 9:44 AM

## 2022-11-01 ENCOUNTER — TELEPHONE (OUTPATIENT)
Dept: PULMONOLOGY | Age: 67
End: 2022-11-01

## 2022-11-01 PROCEDURE — 95811 POLYSOM 6/>YRS CPAP 4/> PARM: CPT | Performed by: PSYCHIATRY & NEUROLOGY

## 2022-11-01 NOTE — TELEPHONE ENCOUNTER
Titration study shows adequate control of the events at a CPAP pressure of 7 cm.       NEED DME:     Patient will need appointment 31-90 days after starting new machine    LMOM to call

## 2022-11-04 ENCOUNTER — HOSPITAL ENCOUNTER (OUTPATIENT)
Dept: PHYSICAL THERAPY | Age: 67
Setting detail: THERAPIES SERIES
Discharge: HOME | End: 2022-11-04
Payer: MEDICARE

## 2022-11-04 PROCEDURE — 97012 MECHANICAL TRACTION THERAPY: CPT

## 2022-11-04 PROCEDURE — 97140 MANUAL THERAPY 1/> REGIONS: CPT

## 2022-11-04 PROCEDURE — 97112 NEUROMUSCULAR REEDUCATION: CPT

## 2022-11-04 NOTE — FLOWSHEET NOTE
[address] [address]  Phone: [phone]   Fax:     [phone]    Physical Therapy Treatment Note/ Progress Report:     Date:  2022    Patient Name:  Dasia Arana    :  1955  MRN: [id number]    Pertinent Medical History:      Medical/Treatment Diagnosis Information:  Medical Diagnosis: Cervical stenosis of spine [M48.02]  Treatment Diagnosis: cervical hypomobility , reduced tolerance to use of RUE , signs consisant with right cervical radiculopathy    Insurance/Certification information:  PT Insurance Information: [id number] [address] [employer] MEDICARE  Physician Information:  Taisha Rose MD  Plan of care signed (Y/N): routed 9/15    Date of Patient follow up with Physician:      Progress Report: []  Yes  [x]  No     Date Range for reporting period:  Beginnin2022  Ending:     Progress report due (10 Rx/or 30 days whichever is less): 39/     Recertification due (POC duration/ or 90 days whichever is less):      Visit # Insurance/POC Allowable Auth Needed   14 Jarrett Blush / NO DED / $40 Haroon Boeck / Blanc Peto / []Yes    [x]No     Functional Outcomes Measure:    Date Assessed: at eval=55  Test: FOTO  Score:68 at 10/24/22    Pain level:  1/10     HISTORY OF INJURY:Patient stated complaint: Insidious onset of right arm pain about 3 weeks ago after waking, denies injury and previous h/o, works full time as an  in a [employer] firm, activities include music  play [employer] , c/o constant pain right shoulder blade pain that radiates to elbow/ forearm, numbness to thumb and 2nd/ 3rd digits , decreased pain post oral steroids and acupuncture,  Decreased sx's w/ heat lying on left side, avoid lifting,  Increased sx's w/ lifting, using mouse at RUE  X ray from 22   FINDINGS:   There is no fracture, subluxation or osseous destruction.   Mild disc space   narrowing and endplate osteophyte formation is present at the C5/6 and C6/7   levels. Prevertebral soft tissues are within normal limits. The lung apices   are clear. SUBJECTIVE:  09/20/22: Patient reports numbness still about the same in the thumb and 2nd and 3rd digits. States some soreness in right shoulder blade area after playing in a festival over the weekend. 9/22/22 Pt reports increased pain and numbness at neck, right shoulder and UE/ hand  from Tuesday to present. Not sure why. 10/4/22 Pt reports decreased pain but his T&N are about the same. 10/6/22 Pt reports feeling a little better than he did on Tuesday. He was able to practice Jamin chi and was not able to do so 2 weeks ago. 10/11/22 Patient reports right shoulder has been slightly sore last night. States numbness in his index and thumb has been about the same. 10/13/22: Patient reports numbness in his index and thumb is not as intense today. States minimal soreness in right shoulder. 10/18/22 Pt reports no pain just continues tingling and numbness. 10/20/22 Pt reports no pain but right hand numbness persists. 10/24/22 Patient reports numbness only minimal and only on the tip of his fingers. States   10/26/22 Patient reports only minimal symptoms. Numbness in right hand is not as wide spread and not as intense. He feels it only at the tip of his right thumb and tip of first finger. 10/31/22 Pat reports his tingling and numbness to continue to ease. 11/4/22 Pt reports his right thumb and 1 st finger numbness continue to diminish. He believes his door hung cervical traction device strained his neck muscles w/ 15# at 10 min. OBJECTIVE:   Observation:   Test measurements:    Cervical AROM Flexion Extension Lateral Flexion  Left Lateral Flex. Right Rotation Left Rotation Right    Eval.         10/6 52 34 33 33 51  61   10/27 53 40 35 31 62 62                                          RESTRICTIONS/PRECAUTIONS:     Exercises/Interventions:   Therapeutic Ex (51949)  Min:  Resistance/Repetitions Notes   Supine chin tuck    Scap retraction    UT stretch    Corner stretch 3 way  1                  HEP 10/20 added      Manual Intervention (14646)  Min: 35     Cerv mobs/manip OA, AA flexion gr 3 C3-4 R rotation gr 3 gross cerv retraction gr 3-4   Supine retraction assist 10 x 4   Cerv. Side glides gr 3-4 L/Hustler             Man ctx decreased RUE symptoms temporary    Thoracic mobs/manip    CT manip     Rib mobilizations Bilat. 1 st  ribs gr 3     STM/Right UT's, scalenes and dorsal pcm's     MET       Nerve glides Right median Nerve    Stretch  to Left and R scalenes , UT's and Lev scap. NMR re-education (74333)  Min: 10     Wall tucks  10 x 2     Corner lat push outs     T slide NS   W's yellow 10 x 2    Table push ups  W/ NS 10 x 2                    Therapeutic Activity (18502)  Min: 5     Neutral spine 10/28 reviewed seated at computer , advised pt that such posture can help to open IVF space for cervical nerve roots demonstrated to pt. Pt assumed NS and tolerated well    Home cerv. Traction  11/4 Reviewed with patient supine  cervical traction unit as a better option if overhead unit continues to irritate his neck  he was encouraged to consider for home use to help minimize his nerve pain and maintain cervical mobility once he is DC'd from PT , he verbalized understanding and all questions were answered               Modalities  Min: 15     CTx 24/7# 60/20 x 15min           Other Therapeutic Activities:  9/15/22 Pt was educated on PT POC, Diagnosis, Prognosis, pathomechanics relative to cervical radiculopathy  as well as frequency and duration of scheduling future physical therapy appointments. Time was also taken on this day to answer all patient questions and participation in PT. Reviewed appointment policy in detail with patient and patient verbalized understanding.      Home Exercise Program:  HEP instruction:   Access Code: [email or web address]  URL: [email or web address]  Date: 09/15/2022  Prepared by: Kiara Sieving     Exercises  Seated Cervical Retraction - 3-5 x daily - 7 x weekly - 2-3 sets - 10 reps - 2-3 hold    Access Code: [email or web address]  URL: [employer]/  Date: 09/22/2022  Prepared by: Kiara Sieving    Exercises  Median Nerve Flossing - Tray - 2 x daily - 7 x weekly - 1-2 sets - 10 reps - 3 hold  Median Nerve Tensioner - 2 x daily - 7 x weekly - 1-2 sets - 10 reps - 3 hold    Access Code: [email or web address]  URL: [employer]/  Date: 10/20/2022  Prepared by: Kiara Sieving    Exercises  Corner Pec Minor Stretch - 1 x daily - 7 x weekly - 1 sets - 1-2 reps - 30 hold  Corner Pec Major Stretch - 1 x daily - 7 x weekly - 1 sets - 1-2 reps - 30 hold  Doorway Pec Stretch at 120 Degrees Abduction - 1 x daily - 7 x weekly - 1 sets - 1-2 reps - 30 hold  Corner Mid Trap Push-Outs - 1 x daily - 7 x weekly - 1-3 sets - 15 reps - 5 hold  Cervical Retraction at Wall - 1 x daily - 7 x weekly - 1-3 sets - 10 reps - 3-5 hold    The patient demonstrated good tolerance to and understanding of the HEP. Written instructions have been issued. Therapeutic Exercise and NMR EXR  [] (49266) Provided verbal/tactile cueing for activities related to strengthening, flexibility, endurance, ROM  for improvements in cervical, postural, scapular, scapulothoracic and UE control with self care, reaching, carrying, lifting, house/yardwork, driving/computer work.    [] (82437) Provided verbal/tactile cueing for activities related to improving balance, coordination, kinesthetic sense, posture, motor skill, proprioception  to assist with cervical, scapular, scapulothoracic and UE control with self care, reaching, carrying, lifting, house/yardwork, driving/computer work.     Therapeutic Activities:    [] (24504 or 00012) Provided verbal/tactile cueing for activities related to improving balance, coordination, kinesthetic sense, posture, motor skill, proprioception and motor activation to allow for proper function of cervical, scapular, scapulothoracic and UE control with self care, carrying, lifting, driving/computer work.      Home Exercise Program:    [] (60144) Reviewed/Progressed HEP activities related to strengthening, flexibility, endurance, ROM of cervical, scapular, scapulothoracic and UE control with self care, reaching, carrying, lifting, house/yardwork, driving/computer work  [] (29044) Reviewed/Progressed HEP activities related to improving balance, coordination, kinesthetic sense, posture, motor skill, proprioception of cervical, scapular, scapulothoracic and UE control with self care, reaching, carrying, lifting, house/yardwork, driving/computer work      Manual Treatments:  PROM / STM / Oscillations-Mobs:  G-I, II, III, IV (PA's, Inf., Post.)  [] (23026) Provided manual therapy to mobilize soft tissue/joints of cervical/CT, scapular GHJ and UE for the purpose of decreasing headache, modulating pain, promoting relaxation,  increasing ROM, reducing/eliminating soft tissue swelling/inflammation/restriction, improving soft tissue extensibility and allowing for proper ROM for normal function with self care, reaching, carrying, lifting, house/yardwork, driving/computer work    If Michelet Tolentino Please Indicate Time In/Out  CPT Code Time in Time out                                   Approval Dates:  CPT Code Units Approved Units Used  Date Updated:                     Charges:  Timed Code Treatment Minutes: 65   Total Treatment Minutes: 70     [] EVAL (LOW) 64582 (typically 20 minutes face-to-face)  [] EVAL (MOD) 26921 (typically 30 minutes face-to-face)  [] EVAL (HIGH) 15539 (typically 45 minutes face-to-face)  [] RE-EVAL     [] HF(19176) x     [] Dry needle 1 or 2 Muscles (11226)  [] NMR (74286) x     [] Dry needle 3+ Muscles (41807)  [x] Manual (97621) x2    [] Ultrasound (51622) x  [] TA (19656) x     [x] Mech Traction (34345)  [] ES(attended) (69718)     [] ES (un) (06791):   [] Vasopump (05471) [] Ionto (34128)   [] Other:    GOALS:  Patient stated goal: relieve numbness and pain   [] Progressing: [] Met: [] Not Met: [] Adjusted     Therapist goals for Patient:   Short Term Goals: To be achieved in: 2 weeks  1. Independent in HEP and progression per patient tolerance, in order to prevent re-injury. [] Progressing: [x] Met: [] Not Met: [] Adjusted  2. Patient will have a decrease in pain 0-4/10 to facilitate improvement in movement, function, and ADLs as indicated by Functional Deficits. [] Progressing: [x] Met: [] Not Met: [] Adjusted     Long Term Goals: To be achieved in: 4-6 weeks  1. Increase FOTO functional outcome score from 55 to 71  to assist with reaching prior level of function. [] Progressing: [x] Met: [] Not Met: [] Adjusted  2. Patient will demonstrate increased AROM to [facility] of cervical/thoracic spine to allow for proper joint functioning as indicated by patients Functional Deficits. [x] Progressing: [] Met: [] Not Met: [] Adjusted  3. Patient will demonstrate an increase in postural awareness and control and activation of  Deep cervical stabilizers to allow for proper functional mobility as indicated by patients Functional Deficits. [] Progressing: [] Met: [] Not Met: [] Adjusted  4. Patient will return to work related and playing music  functional activities without increased symptoms or restriction. [x] Progressing: [] Met: [] Not Met: [] Adjusted  5. Patient will have a decrease in pain 0-2/10 to facilitate improvement in movement, function, and ADLs as indicated by Functional Deficits. [x] Progressing: [] Met: [] Not Met: [] Adjusted     ASSESSMENT:  10/11/22: Tolerated treatment well. Slight decrease of finger numbness after rx. Improving mid cervical mobility.     Treatment/Activity Tolerance:  [x] Patient tolerated treatment well [] Patient limited by fatique  [] Patient limited by pain  [] Patient limited by other medical complications  [] Other:     Overall Progression Towards Functional goals/ Treatment Progress Update:  [] Patient is progressing as expected towards functional goals listed. [x] Progression is slowed due to complexities/Impairments listed. [] Progression has been slowed due to co-morbidities. [] Plan just implemented, too soon to assess goals progression <30days   [] Goals require adjustment due to lack of progress  [] Patient is not progressing as expected and requires additional follow up with physician  [] Other    Prognosis for POC: [x] Good [] Fair  [] Poor    Patient requires continued skilled intervention: [x] Yes  [] No        PLAN:  ADD lateral cerv. Stretches scalenes, UT's and Lev scap to HEP. Reassess   Cont. As above , add nerve gliding, build postural muscle strength and awareness , centralize symptoms   [] Continue per plan of care [] Alter current plan (see comments)  [x] Plan of care initiated [] Hold pending MD visit [] Discharge    Electronically signed by: Tavia Frank, PT    Note: If patient does not return for scheduled/recommended follow up visits, this note will serve as a discharge from care along with the most recent update on progress.

## 2022-11-07 NOTE — TELEPHONE ENCOUNTER
Bernette Mon called in to make an appt to see Dr Ramirez Casper. Advised him that we tried to call him about his sleep study results. Advised him that he would have to choose his DME and call us back with it, then make an appt 31-60 days after he starts using it. He says this is not what he was told. He said he's in the donut hole and asks if this counts as a prescription. Gave him [address] phone number to call and ask as they take 99% of people's insurances since he had not chosen a DME.

## 2022-11-08 ENCOUNTER — HOSPITAL ENCOUNTER (OUTPATIENT)
Dept: PHYSICAL THERAPY | Age: 67
Setting detail: THERAPIES SERIES
Discharge: HOME | End: 2022-11-08
Payer: MEDICARE

## 2022-11-08 PROCEDURE — 97012 MECHANICAL TRACTION THERAPY: CPT

## 2022-11-08 PROCEDURE — 97110 THERAPEUTIC EXERCISES: CPT

## 2022-11-08 NOTE — FLOWSHEET NOTE
[address] [facility] [address] [place] [address]  Phone: [phone]   Fax:     [phone]    Physical Therapy Treatment Note/ Progress Report:     Date:  2022    Patient Name:  Ludivina Morgan    :  1955  MRN: [id number]    Pertinent Medical History:      Medical/Treatment Diagnosis Information:  Medical Diagnosis: Cervical stenosis of spine [M48.02]  Treatment Diagnosis: cervical hypomobility , reduced tolerance to use of RUE , signs consisant with right cervical radiculopathy    Insurance/Certification information:  PT Insurance Information: [employer] MED[employer]  Physician Information:  Demetrio Vogel MD  Plan of care signed (Y/N): routed 9/15    Date of Patient follow up with Physician:      Progress Report: []  Yes  [x]  No     Date Range for reporting period:  Beginnin2022  Ending:     Progress report due (10 Rx/or 30 days whichever is less): 10/20     Recertification due (POC duration/ or 90 days whichever is less):      Visit # Insurance/POC Allowable Auth Needed   15 Alaina Fiddler / NO DED / $40 Darrel Fire / Eugena Ours / []Yes    [x]No     Functional Outcomes Measure:    Date Assessed: at eval=55  Test: FOTO  Score:68 at 10/24/22    Pain level:  /10     HISTORY OF INJURY:Patient stated complaint: Insidious onset of right arm pain about 3 weeks ago after waking, denies injury and previous h/o, works full time as an  in a [employer] firm, activities include music  play [employer] , c/o constant pain right shoulder blade pain that radiates to elbow/ forearm, numbness to thumb and 2nd/ 3rd digits , decreased pain post oral steroids and acupuncture,  Decreased sx's w/ heat lying on left side, avoid lifting,  Increased sx's w/ lifting, using mouse at RUE  X ray from 22   FINDINGS:   There is no fracture, subluxation or osseous destruction.   Mild disc space   narrowing and endplate osteophyte formation is present at the C5/6 and C6/7   levels. Prevertebral soft tissues are within normal limits. The lung apices   are clear. SUBJECTIVE:  09/20/22: Patient reports numbness still about the same in the thumb and 2nd and 3rd digits. States some soreness in right shoulder blade area after playing in a festival over the weekend. 9/22/22 Pt reports increased pain and numbness at neck, right shoulder and UE/ hand  from Tuesday to present. Not sure why. 10/4/22 Pt reports decreased pain but his T&N are about the same. 10/6/22 Pt reports feeling a little better than he did on Tuesday. He was able to practice Jamin chi and was not able to do so 2 weeks ago. 10/11/22 Patient reports right shoulder has been slightly sore last night. States numbness in his index and thumb has been about the same. 10/13/22: Patient reports numbness in his index and thumb is not as intense today. States minimal soreness in right shoulder. 10/18/22 Pt reports no pain just continues tingling and numbness. 10/20/22 Pt reports no pain but right hand numbness persists. 10/24/22 Patient reports numbness only minimal and only on the tip of his fingers. States   10/26/22 Patient reports only minimal symptoms. Numbness in right hand is not as wide spread and not as intense. He feels it only at the tip of his right thumb and tip of first finger. 10/31/22 Pat reports his tingling and numbness to continue to ease. 11/4/22 Pt reports his right thumb and 1 st finger numbness continue to diminish. He believes his door hung cervical traction device strained his neck muscles w/ 15# at 10 min. 11/8/22 Pt reports he continues to improve, the numbness in his right hand is minimal.     OBJECTIVE:   Observation:   Test measurements:    Cervical AROM Flexion Extension Lateral Flexion  Left Lateral Flex. Right Rotation Left Rotation Right    Eval.         10/6 52 34 33 33 51  61   10/27 53 40 35 31 62 62   11/8/22/ 54 44 31 30 61 61                                 RESTRICTIONS/PRECAUTIONS:     Exercises/Interventions:   Therapeutic Ex (59232)  Min: 30 Resistance/Repetitions Notes   Supine chin tuck    Scap retraction    UT stretch    Corner stretch 3 way  1   Cervical stretches  Lateral UT's   Lev scap  Ant. Scalenes  Each 10 breaths Left and right   Rotation w/ towel assist L/R 10\" x 6 ea               HEP 11/8 added      Manual Intervention (02374)  Min:      Cerv mobs/manip           Man ctx decreased RUE symptoms temporary    Thoracic mobs/manip    CT manip    Rib mobilizations    STM/   MET     Nerve glides    Stretch          NMR re-education (52034)  Min: 10     Wall tucks  10 x 2     Corner lat push outs     T slide NS   W's yellow 10 x 2    Table push ups  W/ NS 10 x 2                    Therapeutic Activity (50151)  Min: 5     Neutral spine 10/28 reviewed seated at computer , advised pt that such posture can help to open IVF space for cervical nerve roots demonstrated to pt. Pt assumed NS and tolerated well    Home cerv. Traction  11/4 Reviewed with patient supine  cervical traction unit as a better option if overhead unit continues to irritate his neck  he was encouraged to consider for home use to help minimize his nerve pain and maintain cervical mobility once he is DC'd from PT , he verbalized understanding and all questions were answered               Modalities  Min: 15     CTx 24/7# 60/20 x 15min           Other Therapeutic Activities:  9/15/22 Pt was educated on PT POC, Diagnosis, Prognosis, pathomechanics relative to cervical radiculopathy  as well as frequency and duration of scheduling future physical therapy appointments. Time was also taken on this day to answer all patient questions and participation in PT. Reviewed appointment policy in detail with patient and patient verbalized understanding. Home Exercise Program:  HEP instruction:   Access Code: [email or web address]  URL: [email or web address]  Date: 09/15/2022  Prepared by: Verena Chock     Exercises  Seated Cervical Retraction - 3-5 x daily - 7 x weekly - 2-3 sets - 10 reps - 2-3 hold    Access Code: [email or web address]  URL: [email or web address]  Date: 09/22/2022  Prepared by: Park Chock    Exercises  Median Nerve Flossing - Tray - 2 x daily - 7 x weekly - 1-2 sets - 10 reps - 3 hold  Median Nerve Tensioner - 2 x daily - 7 x weekly - 1-2 sets - 10 reps - 3 hold    Access Code: [email or web address]  URL: "[employer]"/  Date: 10/20/2022  Prepared by: Park Chock    Exercises  Corner Pec Minor Stretch - 1 x daily - 7 x weekly - 1 sets - 1-2 reps - 30 hold  Corner Pec Major Stretch - 1 x daily - 7 x weekly - 1 sets - 1-2 reps - 30 hold  Doorway Pec Stretch at 120 Degrees Abduction - 1 x daily - 7 x weekly - 1 sets - 1-2 reps - 30 hold  Corner Mid Trap Push-Outs - 1 x daily - 7 x weekly - 1-3 sets - 15 reps - 5 hold  Cervical Retraction at Wall - 1 x daily - 7 x weekly - 1-3 sets - 10 reps - 3-5 hold    Access Code: [email or web address]  URL: "[employer]"/  Date: 11/08/2022  Prepared by: Park Chock    Exercises  Seated Cervical Sidebending Stretch - 1 x daily - 3 x weekly - 1 sets - 2-4 reps - 30 hold  Seated Levator Scapulae Stretch - 1 x daily - 3 x weekly - 1 sets - 2-4 reps - 30 hold  Seated Assisted Cervical Rotation with Towel - 1 x daily - 3 x weekly - 1 sets - 5-10 reps - 10 hold  Seated Scalene Stretch with Towel - 1 x daily - 3 x weekly - 1 sets - 2-4 reps - 30 hold    The patient demonstrated good tolerance to and understanding of the HEP. Written instructions have been issued.      Therapeutic Exercise and NMR EXR  [] (54901) Provided verbal/tactile cueing for activities related to strengthening, flexibility, endurance, ROM  for improvements in cervical, postural, scapular, scapulothoracic and UE control with self care, reaching, carrying, lifting, house/yardwork, driving/computer work.    [] (72935) Provided verbal/tactile cueing for activities related to improving balance, coordination, kinesthetic sense, posture, motor skill, proprioception  to assist with cervical, scapular, scapulothoracic and UE control with self care, reaching, carrying, lifting, house/yardwork, driving/computer work. Therapeutic Activities:    [] (55213 or 13883) Provided verbal/tactile cueing for activities related to improving balance, coordination, kinesthetic sense, posture, motor skill, proprioception and motor activation to allow for proper function of cervical, scapular, scapulothoracic and UE control with self care, carrying, lifting, driving/computer work.      Home Exercise Program:    [] (94614) Reviewed/Progressed HEP activities related to strengthening, flexibility, endurance, ROM of cervical, scapular, scapulothoracic and UE control with self care, reaching, carrying, lifting, house/yardwork, driving/computer work  [] (59649) Reviewed/Progressed HEP activities related to improving balance, coordination, kinesthetic sense, posture, motor skill, proprioception of cervical, scapular, scapulothoracic and UE control with self care, reaching, carrying, lifting, house/yardwork, driving/computer work      Manual Treatments:  PROM / STM / Oscillations-Mobs:  G-I, II, III, IV (PA's, Inf., Post.)  [] (59323) Provided manual therapy to mobilize soft tissue/joints of cervical/CT, scapular GHJ and UE for the purpose of decreasing headache, modulating pain, promoting relaxation,  increasing ROM, reducing/eliminating soft tissue swelling/inflammation/restriction, improving soft tissue extensibility and allowing for proper ROM for normal function with self care, reaching, carrying, lifting, house/yardwork, driving/computer work    If [facility] Please Indicate Time In/Out  CPT Code Time in Time out                                   Approval Dates:  CPT Code Units Approved Units Used  Date Updated:                     Charges:  Timed Code Treatment Minutes: 50   Total Treatment Minutes: 52 [] EVAL (LOW) 52299 (typically 20 minutes face-to-face)  [] EVAL (MOD) 27297 (typically 30 minutes face-to-face)  [] EVAL (HIGH) 67055 (typically 45 minutes face-to-face)  [] RE-EVAL     [x] AK(20704) x  2   [] Dry needle 1 or 2 Muscles (02526)  [] NMR (04438) x     [] Dry needle 3+ Muscles (61132)  [] Manual (93198) x2    [] Ultrasound (84682) x  [] TA (78659) x     [x] Mech Traction (07258)  [] ES(attended) (43495)     [] ES (un) (85732):   [] Vasopump (81382) [] Ionto (76797)   [] Other:    GOALS:  Patient stated goal: relieve numbness and pain   [] Progressing: [] Met: [] Not Met: [] Adjusted     Therapist goals for Patient:   Short Term Goals: To be achieved in: 2 weeks  1. Independent in HEP and progression per patient tolerance, in order to prevent re-injury. [] Progressing: [x] Met: [] Not Met: [] Adjusted  2. Patient will have a decrease in pain 0-4/10 to facilitate improvement in movement, function, and ADLs as indicated by Functional Deficits. [] Progressing: [x] Met: [] Not Met: [] Adjusted     Long Term Goals: To be achieved in: 4-6 weeks  1. Increase FOTO functional outcome score from 55 to 71  to assist with reaching prior level of function. [] Progressing: [x] Met: [] Not Met: [] Adjusted  2. Patient will demonstrate increased AROM to [facility] of cervical/thoracic spine to allow for proper joint functioning as indicated by patients Functional Deficits. [x] Progressing: [] Met: [] Not Met: [] Adjusted  3. Patient will demonstrate an increase in postural awareness and control and activation of  Deep cervical stabilizers to allow for proper functional mobility as indicated by patients Functional Deficits. [] Progressing: [] Met: [] Not Met: [] Adjusted  4. Patient will return to work related and playing music  functional activities without increased symptoms or restriction. [x] Progressing: [] Met: [] Not Met: [] Adjusted  5.  Patient will have a decrease in pain 0-2/10 to facilitate improvement in movement, function, and ADLs as indicated by Functional Deficits. [x] Progressing: [] Met: [] Not Met: [] Adjusted     ASSESSMENT:  10/11/22: Tolerated treatment well. Slight decrease of finger numbness after rx. Improving mid cervical mobility. Treatment/Activity Tolerance:  [x] Patient tolerated treatment well [] Patient limited by fatique  [] Patient limited by pain  [] Patient limited by other medical complications  [] Other:     Overall Progression Towards Functional goals/ Treatment Progress Update:  [] Patient is progressing as expected towards functional goals listed. [x] Progression is slowed due to complexities/Impairments listed. [] Progression has been slowed due to co-morbidities. [] Plan just implemented, too soon to assess goals progression <30days   [] Goals require adjustment due to lack of progress  [] Patient is not progressing as expected and requires additional follow up with physician  [] Other    Prognosis for POC: [x] Good [] Fair  [] Poor    Patient requires continued skilled intervention: [x] Yes  [] No        PLAN:  Last rx 11/11/22 , plan to DC  Cont. As above , add nerve gliding, build postural muscle strength and awareness , centralize symptoms   [] Continue per plan of care [] Alter current plan (see comments)  [x] Plan of care initiated [] Hold pending MD visit [] Discharge    Electronically signed by: Keyla Castle PT    Note: If patient does not return for scheduled/recommended follow up visits, this note will serve as a discharge from care along with the most recent update on progress.

## 2022-11-11 ENCOUNTER — HOSPITAL ENCOUNTER (OUTPATIENT)
Dept: PHYSICAL THERAPY | Age: 67
Setting detail: THERAPIES SERIES
Discharge: HOME | End: 2022-11-11
Payer: MEDICARE

## 2022-11-11 PROCEDURE — 97140 MANUAL THERAPY 1/> REGIONS: CPT

## 2022-11-11 PROCEDURE — 97012 MECHANICAL TRACTION THERAPY: CPT

## 2022-11-11 PROCEDURE — 97110 THERAPEUTIC EXERCISES: CPT

## 2022-11-11 NOTE — TELEPHONE ENCOUNTER
Patient would like order sent to [facility]. He will call insurance and make sure they are in network and then call us back on Monday.

## 2022-11-11 NOTE — FLOWSHEET NOTE
[address] [facility] [place]. [place] [address]  Phone: [phone]   Fax:     [phone]     Physical Therapy Discharge Summary    Dear Sebastien Kirkland MD,    We had the pleasure of treating the following patient for physical therapy services at [address] [place] [address]. A summary of our findings can be found in the discharge summary below. If you have any questions or concerns regarding these findings, please do not hesitate to contact me at the office phone number above.   Thank you for the referral.     Physician Signature:________________________________Date:__________________  By signing above (or electronic signature), therapists plan is approved by physician      Overall Response to Treatment:   []Patient is responding well to treatment and improvement is noted with regards  to goals   [x]Patient should continue to improve in reasonable time if they continue HEP   []Patient has plateaued and is no longer responding to skilled PT intervention    []Patient is getting worse and would benefit from return to referring MD   []Patient unable to adhere to initial POC   []Other:     Date range of Visits: 9/15/22 to 22   Total Visits: 16     Physical Therapy Treatment Note/ Progress Report:     Date:  2022    Patient Name:  Marco Antonio Angulo    :  1955  MRN: [id number]    Pertinent Medical History:      Medical/Treatment Diagnosis Information:  Medical Diagnosis: Cervical stenosis of spine [M48.02]  Treatment Diagnosis: cervical hypomobility , reduced tolerance to use of RUE , signs consisant with right cervical radiculopathy    Insurance/Certification information:  PT Insurance Information: Angusie Harries MEDICARE  Physician Information:  Sebastien Kirkland MD  Plan of care signed (Y/N): routed 9/15    Date of Patient follow up with Physician:      Progress Report: []  Yes  [x]  No     Date Range for reporting period:  Beginnin2022  Ending:     Progress report due (10 Rx/or 30 days whichever is less): 82/37     Recertification due (POC duration/ or 90 days whichever is less):      Visit # Insurance/POC Allowable Auth Needed   [address] / NO DED / $40 [place] / Paula Gals / []Yes    [x]No     Functional Outcomes Measure:    Date Assessed: at eval=55  Test: FOTO  Score:68 at 10/24/22, 22 = 78    Pain level:  0-1/10     HISTORY OF INJURY:Patient stated complaint: Insidious onset of right arm pain about 3 weeks ago after waking, denies injury and previous h/o, works full time as an  in a [employer] firm, activities include music  play [employer] , c/o constant pain right shoulder blade pain that radiates to elbow/ forearm, numbness to thumb and 2nd/ 3rd digits , decreased pain post oral steroids and acupuncture,  Decreased sx's w/ heat lying on left side, avoid lifting,  Increased sx's w/ lifting, using mouse at RUE  X ray from 22   FINDINGS:   There is no fracture, subluxation or osseous destruction. Mild disc space   narrowing and endplate osteophyte formation is present at the C5/6 and C6/7   levels. Prevertebral soft tissues are within normal limits. The lung apices   are clear. SUBJECTIVE:  22: Patient reports numbness still about the same in the thumb and 2nd and 3rd digits. States some soreness in right shoulder blade area after playing in a festival over the weekend. 22 Pt reports increased pain and numbness at neck, right shoulder and UE/ hand  from Tuesday to present. Not sure why. 10/4/22 Pt reports decreased pain but his T&N are about the same. 10/6/22 Pt reports feeling a little better than he did on Tuesday. He was able to practice Jamin chi and was not able to do so 2 weeks ago. 10/11/22 Patient reports right shoulder has been slightly sore last night. States numbness in his index and thumb has been about the same.   10/13/22: Patient reports numbness in his index and thumb is not as intense today. States minimal soreness in right shoulder. 10/18/22 Pt reports no pain just continues tingling and numbness. 10/20/22 Pt reports no pain but right hand numbness persists. 10/24/22 Patient reports numbness only minimal and only on the tip of his fingers. States   10/26/22 Patient reports only minimal symptoms. Numbness in right hand is not as wide spread and not as intense. He feels it only at the tip of his right thumb and tip of first finger. 10/31/22 Pat reports his tingling and numbness to continue to ease. 11/4/22 Pt reports his right thumb and 1 st finger numbness continue to diminish. He believes his door hung cervical traction device strained his neck muscles w/ 15# at 10 min. 11/8/22 Pt reports he continues to improve, the numbness in his right hand is minimal.   11/11/22 Pt reports minimal symptoms today. He is ready for today to be his last in PT. He reports feeling 95% improved. OBJECTIVE:   Observation:   Test measurements:    Cervical AROM Flexion Extension Lateral Flexion  Left Lateral Flex. Right Rotation Left Rotation Right    Eval.         10/6 52 34 33 33 51  61   10/27 53 40 35 31 62 62   11/8/22/ 54 44 31 30 61 61                                 RESTRICTIONS/PRECAUTIONS:     Exercises/Interventions:   Therapeutic Ex (05750)  Min: 30 Resistance/Repetitions Notes   Supine chin tuck    Scap retraction    UT stretch    Corner stretch 3 way  1   Cervical stretches  Lateral UT's   Lev scap  Ant. Scalenes  Each 10 breaths Left and right   Rotation w/ towel assist L/R 10\" x 6 ea               HEP 11/11 reviewed HEP from 11/8 and 9/22      Manual Intervention (25771)  Min: 15     Cerv mobs/manip OA, AA flexion gr 3 C3-4 R rotation gr 3,sideglides,man gross cerv retraction gr 3-4   Supine retraction assist 10 x 4             Man ctx decreased RUE symptoms temporary    Thoracic mobs/manip    CT manip    Rib mobilizations Bilat.  1 st and 2nd ribs gr 3     STM/ MET     Nerve glides Right median Nerve    Stretch          NMR re-education (30586)  Min: 4     Wall tucks  10 x 2     Corner lat push outs     T slide NS      Table push ups                    Therapeutic Activity (57190)  Min:      Neutral spine 10/28 reviewed seated at computer , advised pt that such posture can help to open IVF space for cervical nerve roots demonstrated to pt. Pt assumed NS and tolerated well    Home cerv. Traction  11/4 Reviewed with patient supine  cervical traction unit as a better option if overhead unit continues to irritate his neck  he was encouraged to consider for home use to help minimize his nerve pain and maintain cervical mobility once he is DC'd from PT , he verbalized understanding and all questions were answered               Modalities  Min: 15     CTx 24/7# 60/20 x 15min           Other Therapeutic Activities:  9/15/22 Pt was educated on PT POC, Diagnosis, Prognosis, pathomechanics relative to cervical radiculopathy  as well as frequency and duration of scheduling future physical therapy appointments. Time was also taken on this day to answer all patient questions and participation in PT. Reviewed appointment policy in detail with patient and patient verbalized understanding. Home Exercise Program:  HEP instruction:   Access Code: [email or web address]  URL: [email or web address]  Date: 09/15/2022  Prepared by: Tiffany fruux     Exercises  Seated Cervical Retraction - 3-5 x daily - 7 x weekly - 2-3 sets - 10 reps - 2-3 hold    Access Code: [email or web address]  URL: [employer]/  Date: 09/22/2022  Prepared by: Tiffany Starr    Exercises  Median Nerve Flossing - Tray - 2 x daily - 7 x weekly - 1-2 sets - 10 reps - 3 hold  Median Nerve Tensioner - 2 x daily - 7 x weekly - 1-2 sets - 10 reps - 3 hold    Access Code: [email or web address]  URL: [employer]/  Date: 10/20/2022  Prepared by: Tiffany Starr    Exercises  Corner Pec Minor Stretch - 1 x daily - 7 x weekly - 1 sets - 1-2 reps - 30 hold  Corner Pec Major Stretch - 1 x daily - 7 x weekly - 1 sets - 1-2 reps - 30 hold  Doorway Pec Stretch at 120 Degrees Abduction - 1 x daily - 7 x weekly - 1 sets - 1-2 reps - 30 hold  Corner Mid Trap Push-Outs - 1 x daily - 7 x weekly - 1-3 sets - 15 reps - 5 hold  Cervical Retraction at Wall - 1 x daily - 7 x weekly - 1-3 sets - 10 reps - 3-5 hold    Access Code: [email or web address]  URL: [email or web address]  Date: 11/08/2022  Prepared by: Zenon Berry    Exercises  Seated Cervical Sidebending Stretch - 1 x daily - 3 x weekly - 1 sets - 2-4 reps - 30 hold  Seated Levator Scapulae Stretch - 1 x daily - 3 x weekly - 1 sets - 2-4 reps - 30 hold  Seated Assisted Cervical Rotation with Towel - 1 x daily - 3 x weekly - 1 sets - 5-10 reps - 10 hold  Seated Scalene Stretch with Towel - 1 x daily - 3 x weekly - 1 sets - 2-4 reps - 30 hold    The patient demonstrated good tolerance to and understanding of the HEP. Written instructions have been issued. Therapeutic Exercise and NMR EXR  [] (99965) Provided verbal/tactile cueing for activities related to strengthening, flexibility, endurance, ROM  for improvements in cervical, postural, scapular, scapulothoracic and UE control with self care, reaching, carrying, lifting, house/yardwork, driving/computer work.    [] (84255) Provided verbal/tactile cueing for activities related to improving balance, coordination, kinesthetic sense, posture, motor skill, proprioception  to assist with cervical, scapular, scapulothoracic and UE control with self care, reaching, carrying, lifting, house/yardwork, driving/computer work.     Therapeutic Activities:    [] (54892 or 22265) Provided verbal/tactile cueing for activities related to improving balance, coordination, kinesthetic sense, posture, motor skill, proprioception and motor activation to allow for proper function of cervical, scapular, scapulothoracic and UE control with self care, carrying, lifting, driving/computer work.      Home Exercise Program:    [] (66428) Reviewed/Progressed HEP activities related to strengthening, flexibility, endurance, ROM of cervical, scapular, scapulothoracic and UE control with self care, reaching, carrying, lifting, house/yardwork, driving/computer work  [] (62933) Reviewed/Progressed HEP activities related to improving balance, coordination, kinesthetic sense, posture, motor skill, proprioception of cervical, scapular, scapulothoracic and UE control with self care, reaching, carrying, lifting, house/yardwork, driving/computer work      Manual Treatments:  PROM / STM / Oscillations-Mobs:  G-I, II, III, IV (PA's, Inf., Post.)  [] (16132) Provided manual therapy to mobilize soft tissue/joints of cervical/CT, scapular GHJ and UE for the purpose of decreasing headache, modulating pain, promoting relaxation,  increasing ROM, reducing/eliminating soft tissue swelling/inflammation/restriction, improving soft tissue extensibility and allowing for proper ROM for normal function with self care, reaching, carrying, lifting, house/yardwork, driving/computer work    If Michelet Tolentino Please Indicate Time In/Out  CPT Code Time in Time out                                   Approval Dates:  CPT Code Units Approved Units Used  Date Updated:                     Charges:  Timed Code Treatment Minutes: 65   Total Treatment Minutes: 70     [] EVAL (LOW) 73920 (typically 20 minutes face-to-face)  [] EVAL (MOD) 00790 (typically 30 minutes face-to-face)  [] EVAL (HIGH) 67750 (typically 45 minutes face-to-face)  [] RE-EVAL     [x] IU(86165) x  2   [] Dry needle 1 or 2 Muscles (89936)  [] NMR (80197) x     [] Dry needle 3+ Muscles (72689)  [x] Manual (49691) x   [] Ultrasound (31521) x  [] TA (46914) x     [x] Mech Traction (50356)  [] ES(attended) (39575)     [] ES (un) (20105):   [] Vasopump (23301) [] Ionto (23983)   [] Other:    GOALS:  Patient stated goal: relieve numbness and pain   [] Progressing: [] Met: [] Not Met: [] Adjusted     Therapist goals for Patient:   Short Term Goals: To be achieved in: 2 weeks  1. Independent in HEP and progression per patient tolerance, in order to prevent re-injury. [] Progressing: [x] Met: [] Not Met: [] Adjusted  2. Patient will have a decrease in pain 0-4/10 to facilitate improvement in movement, function, and ADLs as indicated by Functional Deficits. [] Progressing: [x] Met: [] Not Met: [] Adjusted     Long Term Goals: To be achieved in: 4-6 weeks  1. Increase FOTO functional outcome score from 55 to 71  to assist with reaching prior level of function. [] Progressing: [x] Met: [] Not Met: [] Adjusted  2. Patient will demonstrate increased AROM to [facility] of cervical/thoracic spine to allow for proper joint functioning as indicated by patients Functional Deficits. [] Progressing: [x] Met: [] Not Met: [] Adjusted  3. Patient will demonstrate an increase in postural awareness and control and activation of  Deep cervical stabilizers to allow for proper functional mobility as indicated by patients Functional Deficits. [] Progressing: [x] Met: [] Not Met: [] Adjusted  4. Patient will return to work related and playing music  functional activities without increased symptoms or restriction. [] Progressing: [x] Met: [] Not Met: [] Adjusted  5. Patient will have a decrease in pain 0-2/10 to facilitate improvement in movement, function, and ADLs as indicated by Functional Deficits. [] Progressing: [x] Met: [] Not Met: [] Adjusted     ASSESSMENT:  10/11/22: Tolerated treatment well. Slight decrease of finger numbness after rx. Improving mid cervical mobility.     Treatment/Activity Tolerance:  [x] Patient tolerated treatment well [] Patient limited by fatique  [] Patient limited by pain  [] Patient limited by other medical complications  [] Other:     Overall Progression Towards Functional goals/ Treatment Progress Update:  [] Patient is progressing as expected towards functional goals listed. [x] Progression is slowed due to complexities/Impairments listed. [] Progression has been slowed due to co-morbidities. [] Plan just implemented, too soon to assess goals progression <30days   [] Goals require adjustment due to lack of progress  [] Patient is not progressing as expected and requires additional follow up with physician  [] Other    Prognosis for POC: [x] Good [] Fair  [] Poor    Patient requires continued skilled intervention: [] Yes  [x] No        PLAN:    [] Continue per plan of care [] Alter current plan (see comments)  [] Plan of care initiated [] Hold pending MD visit [x] Discharge    Electronically signed by: Yari Sherwood PT    Note: If patient does not return for scheduled/recommended follow up visits, this note will serve as a discharge from care along with the most recent update on progress.

## 2022-11-23 ENCOUNTER — TELEPHONE (OUTPATIENT)
Dept: PULMONOLOGY | Age: 67
End: 2022-11-23

## 2023-01-05 ENCOUNTER — TELEPHONE (OUTPATIENT)
Dept: PULMONOLOGY | Age: 68
End: 2023-01-05

## 2023-01-05 NOTE — TELEPHONE ENCOUNTER
Patient calling stating he was told by Dr Cari Denver that he would not have to pay anything for his cpap machine out of pocket. He is now being told he will have to pay 600.00 over the next 12 months. He is asking to speak directly with Dr Cari Denver.   I told him we did not have anything to do with billing or cost and he is still insisting to speak with Dr Edmond Cesar

## 2025-06-12 NOTE — PROGRESS NOTES
Patient reached ____ yes  ____x_ no         MY Chart message sent  __x___  VM instructions left ___x_ yes   phone number ___[phone]_____                                ____ no-office notified    [address], [place]   [address]      Date _6/17________  Time __1200_____  Arrival _1030__    Nothing to eat or drink after midnight-follow your doctors prep instructions-this may include taking a second dose of your prep after midnight    Responsible adult 18 or older to stay on site while you are here-drive you home-stay with you after    Dress comfortably-No makeup or jewlrey    Follow any instructions your doctors office has given you    Bring a complete list of all your medications and supplements including name,dose,how often taken the day of your procedure     If you normally take the following medications in the morning please do so the AM of your procedure with a small sip of water       Heart,blood pressure,seizure,thyroid or breathing medications-use your inhalers-bring any rescue inhalers with you DOS       DO NOT take blood pressure medications ending in \"janine\" or \"pril\" the AM of procedure or evening prior    Take half or your normal dose of any long acting insulins the night before your procedure-do not take any diabetic medications the AM of procedure.     If you take a weekly injection for diabetes or weight loss-do not take one week prior to surgery/procedure.If you have already taken your injection this week,contact your surgeon. There is a possibility of cancellation if taken.    If you take any SGLT2  medications such as Jardiance ,Invokana, Synjardy or Farxiga. You need to stop these 3 days prior to surgery/procedure. If you have already taken, contact your surgeon. There is a possibility of cancellation if taken.    Follow your doctors instructions regarding stopping or taking  any blood thinners-if you do not have instructions-call them If on ELIQUIS get instructions on

## 2025-06-12 NOTE — PROGRESS NOTES
Preop instructions sent via [employer]. Patient informed.    Instructions given:    Verbal ____    Voicemail ___x_

## 2025-06-17 ENCOUNTER — ANESTHESIA EVENT (OUTPATIENT)
Dept: ENDOSCOPY | Age: 70
End: 2025-06-17
Payer: MEDICARE

## 2025-06-17 ENCOUNTER — HOSPITAL ENCOUNTER (OUTPATIENT)
Age: 70
Setting detail: OUTPATIENT SURGERY
Discharge: HOME | End: 2025-06-17
Attending: SURGERY | Admitting: SURGERY
Payer: MEDICARE

## 2025-06-17 ENCOUNTER — HOSPITAL ENCOUNTER (EMERGENCY)
Age: 70
Discharge: HOME | End: 2025-06-17
Payer: MEDICARE

## 2025-06-17 ENCOUNTER — ANESTHESIA (OUTPATIENT)
Dept: ENDOSCOPY | Age: 70
End: 2025-06-17
Payer: MEDICARE

## 2025-06-17 VITALS
OXYGEN SATURATION: 100 % | DIASTOLIC BLOOD PRESSURE: 58 MMHG | RESPIRATION RATE: 18 BRPM | SYSTOLIC BLOOD PRESSURE: 136 MMHG | HEIGHT: 69 IN | WEIGHT: 166 LBS | BODY MASS INDEX: 24.59 KG/M2 | TEMPERATURE: 98.9 F | HEART RATE: 80 BPM

## 2025-06-17 VITALS
SYSTOLIC BLOOD PRESSURE: 131 MMHG | RESPIRATION RATE: 9 BRPM | OXYGEN SATURATION: 98 % | TEMPERATURE: 96.8 F | HEART RATE: 59 BPM | DIASTOLIC BLOOD PRESSURE: 67 MMHG | BODY MASS INDEX: 23.7 KG/M2 | HEIGHT: 69 IN | WEIGHT: 160 LBS

## 2025-06-17 DIAGNOSIS — Z12.11 COLON CANCER SCREENING: ICD-10-CM

## 2025-06-17 DIAGNOSIS — M79.661 RIGHT CALF PAIN: Primary | ICD-10-CM

## 2025-06-17 LAB
ANION GAP SERPL CALCULATED.3IONS-SCNC: 13 MMOL/L (ref 3–16)
BASOPHILS # BLD: 0.1 K/UL (ref 0–0.2)
BASOPHILS NFR BLD: 0.7 %
BUN SERPL-MCNC: 23 MG/DL (ref 7–20)
CALCIUM SERPL-MCNC: 9 MG/DL (ref 8.3–10.6)
CHLORIDE SERPL-SCNC: 99 MMOL/L (ref 99–110)
CO2 SERPL-SCNC: 26 MMOL/L (ref 21–32)
CREAT SERPL-MCNC: 1.2 MG/DL (ref 0.8–1.3)
D-DIMER QUANTITATIVE: <0.27 UG/ML FEU (ref 0–0.6)
DEPRECATED RDW RBC AUTO: 13 % (ref 12.4–15.4)
EOSINOPHIL # BLD: 0.2 K/UL (ref 0–0.6)
EOSINOPHIL NFR BLD: 1.7 %
GFR SERPLBLD CREATININE-BSD FMLA CKD-EPI: 65 ML/MIN/{1.73_M2}
GLUCOSE SERPL-MCNC: 135 MG/DL (ref 70–99)
HCT VFR BLD AUTO: 40.7 % (ref 40.5–52.5)
HGB BLD-MCNC: 14.3 G/DL (ref 13.5–17.5)
LYMPHOCYTES # BLD: 2.5 K/UL (ref 1–5.1)
LYMPHOCYTES NFR BLD: 23.8 %
MCH RBC QN AUTO: 30.7 PG (ref 26–34)
MCHC RBC AUTO-ENTMCNC: 35.3 G/DL (ref 31–36)
MCV RBC AUTO: 87.1 FL (ref 80–100)
MONOCYTES # BLD: 0.9 K/UL (ref 0–1.3)
MONOCYTES NFR BLD: 8.1 %
NEUTROPHILS # BLD: 6.9 K/UL (ref 1.7–7.7)
NEUTROPHILS NFR BLD: 65.7 %
PLATELET # BLD AUTO: 320 K/UL (ref 135–450)
PMV BLD AUTO: 7.5 FL (ref 5–10.5)
POTASSIUM SERPL-SCNC: 3.5 MMOL/L (ref 3.5–5.1)
RBC # BLD AUTO: 4.67 M/UL (ref 4.2–5.9)
SODIUM SERPL-SCNC: 138 MMOL/L (ref 136–145)
WBC # BLD AUTO: 10.5 K/UL (ref 4–11)

## 2025-06-17 PROCEDURE — 2709999900 HC NON-CHARGEABLE SUPPLY: Performed by: SURGERY

## 2025-06-17 PROCEDURE — 3700000000 HC ANESTHESIA ATTENDED CARE: Performed by: SURGERY

## 2025-06-17 PROCEDURE — 7100000001 HC PACU RECOVERY - ADDTL 15 MIN: Performed by: SURGERY

## 2025-06-17 PROCEDURE — 3609010600 HC COLONOSCOPY POLYPECTOMY SNARE/COLD BIOPSY: Performed by: SURGERY

## 2025-06-17 PROCEDURE — 85379 FIBRIN DEGRADATION QUANT: CPT

## 2025-06-17 PROCEDURE — 7100000011 HC PHASE II RECOVERY - ADDTL 15 MIN: Performed by: SURGERY

## 2025-06-17 PROCEDURE — 88305 TISSUE EXAM BY PATHOLOGIST: CPT

## 2025-06-17 PROCEDURE — 80048 BASIC METABOLIC PNL TOTAL CA: CPT

## 2025-06-17 PROCEDURE — 99283 EMERGENCY DEPT VISIT LOW MDM: CPT

## 2025-06-17 PROCEDURE — 6360000002 HC RX W HCPCS: Performed by: NURSE ANESTHETIST, CERTIFIED REGISTERED

## 2025-06-17 PROCEDURE — 2580000003 HC RX 258: Performed by: ANESTHESIOLOGY

## 2025-06-17 PROCEDURE — 7100000010 HC PHASE II RECOVERY - FIRST 15 MIN: Performed by: SURGERY

## 2025-06-17 PROCEDURE — C1889 IMPLANT/INSERT DEVICE, NOC: HCPCS | Performed by: SURGERY

## 2025-06-17 PROCEDURE — 3609010200 HC COLONOSCOPY ABLATION TUMOR POLYP/OTHER LES: Performed by: SURGERY

## 2025-06-17 PROCEDURE — 7100000000 HC PACU RECOVERY - FIRST 15 MIN: Performed by: SURGERY

## 2025-06-17 PROCEDURE — 85025 COMPLETE CBC W/AUTO DIFF WBC: CPT

## 2025-06-17 PROCEDURE — 3700000001 HC ADD 15 MINUTES (ANESTHESIA): Performed by: SURGERY

## 2025-06-17 DEVICE — WORKING LENGTH 235CM, WORKING CHANNEL 2.8MM
Type: IMPLANTABLE DEVICE | Status: FUNCTIONAL
Brand: RESOLUTION 360 CLIP

## 2025-06-17 RX ORDER — PROPOFOL 10 MG/ML
INJECTION, EMULSION INTRAVENOUS
Status: DISCONTINUED | OUTPATIENT
Start: 2025-06-17 | End: 2025-06-17 | Stop reason: SDUPTHER

## 2025-06-17 RX ORDER — LIDOCAINE HYDROCHLORIDE 20 MG/ML
INJECTION, SOLUTION INFILTRATION; PERINEURAL
Status: DISCONTINUED | OUTPATIENT
Start: 2025-06-17 | End: 2025-06-17 | Stop reason: SDUPTHER

## 2025-06-17 RX ORDER — LIDOCAINE 4 G/G
1 PATCH TOPICAL ONCE
Status: DISCONTINUED | OUTPATIENT
Start: 2025-06-17 | End: 2025-06-17

## 2025-06-17 RX ORDER — SODIUM CHLORIDE 9 MG/ML
INJECTION, SOLUTION INTRAVENOUS CONTINUOUS
Status: DISCONTINUED | OUTPATIENT
Start: 2025-06-17 | End: 2025-06-17 | Stop reason: HOSPADM

## 2025-06-17 RX ADMIN — SODIUM CHLORIDE: 0.9 INJECTION, SOLUTION INTRAVENOUS at 11:20

## 2025-06-17 RX ADMIN — PROPOFOL 140 MCG/KG/MIN: 10 INJECTION, EMULSION INTRAVENOUS at 12:52

## 2025-06-17 RX ADMIN — LIDOCAINE HYDROCHLORIDE 50 MG: 20 INJECTION, SOLUTION INFILTRATION; PERINEURAL at 12:52

## 2025-06-17 RX ADMIN — PROPOFOL 50 MG: 10 INJECTION, EMULSION INTRAVENOUS at 12:52

## 2025-06-17 RX ADMIN — PROPOFOL 50 MG: 10 INJECTION, EMULSION INTRAVENOUS at 12:53

## 2025-06-17 ASSESSMENT — LIFESTYLE VARIABLES
HOW MANY STANDARD DRINKS CONTAINING ALCOHOL DO YOU HAVE ON A TYPICAL DAY: 1 OR 2
HOW OFTEN DO YOU HAVE A DRINK CONTAINING ALCOHOL: 2-3 TIMES A WEEK

## 2025-06-17 ASSESSMENT — PAIN SCALES - GENERAL: PAINLEVEL_OUTOF10: 3

## 2025-06-17 ASSESSMENT — PAIN - FUNCTIONAL ASSESSMENT
PAIN_FUNCTIONAL_ASSESSMENT: 0-10
PAIN_FUNCTIONAL_ASSESSMENT: 0-10

## 2025-06-17 NOTE — ANESTHESIA POSTPROCEDURE EVALUATION
Department of Anesthesiology  Postprocedure Note    Patient: Kaiser Yo  MRN: [id number]  YOB: 1955  Date of evaluation: 6/17/2025    Procedure Summary       Date: 06/17/25 Room / Location: [place] [address] / [facility]    Anesthesia Start: 1249 Anesthesia Stop: 1317    Procedure: COLONOSCOPY POLYPECTOMY ABLATION Diagnosis:       Colon cancer screening      (Colon cancer screening [Z12.11])    Surgeons: Brent Lundberg MD Responsible Provider: Gary Howell MD    Anesthesia Type: MAC ASA Status: 2            Anesthesia Type: No value filed.    James Phase I: James Score: 10    James Phase II: James Score: 10    Anesthesia Post Evaluation    Patient location during evaluation: PACU  Patient participation: complete - patient participated  Level of consciousness: awake  Pain score: 0  Airway patency: patent  Cardiovascular status: hemodynamically stable  Respiratory status: nasal cannula  Hydration status: euvolemic  Pain management: adequate    No notable events documented.

## 2025-06-17 NOTE — OP NOTE
Patient: Kaiser Yo  YOB: 1955  MRN: [id number]  [facility]:  [id number]  Provider:  Brent Lundberg MD    Date of Procedure: 6/17/2025    Pre-Op Diagnosis: History of colon polyps    Post-Op Diagnosis: Polyp of the transverse colon and diverticulosis of sigmoid colon      Procedure colonoscopy up to cecum with snare polypectomy transverse colon  Placement of the Endo Clip at the polypectomy site    Surgeon(s):  Brent Lundberg MD    Anesthesia: Monitor Anesthesia Care    Estimated Blood Loss (mL): Minimal    Detailed Description of Procedure:   The patient was placed in the left lateral position.  Olympus colonoscope was inserted all the way up to the cecum.  The cecum and ileocecal valve were within normal limits.  He had 1.2 cm polyp in the transverse colon which was removed with 2 cold snare.  Endo Clip placement was performed for the hemostasis purposes.  He also had moderate diverticulosis sigmoid colon.  No other polyps were seen.  No cancer was seen.  Colon mucosa was normal.  His prep was good.    Patient tolerated the procedure well and left the endoscopy room in a satisfactory condition.    Electronically signed by Brent Lundberg MD on 6/17/2025 at 1:13 PM

## 2025-06-17 NOTE — ANESTHESIA PRE PROCEDURE
Department of Anesthesiology  Preprocedure Note       Name:  Kaiser Yo   Age:  69 y.o.  :  1955                                          MRN:  [id number]         Date:  2025      Surgeon: Surgeon(s):  Brent Lundberg MD    Procedure: Procedure(s):  COLONOSCOPY DIAGNOSTIC    Medications prior to admission:   Prior to Admission medications    Medication Sig Start Date End Date Taking? Authorizing Provider   rosuvastatin (CRESTOR) 20 MG tablet Take 1 tablet by mouth daily Take 1 tablet by mouth daily change quantity from 30 to 90 22   Raza Proter MD   hydrocortisone 2.5 % cream Apply topically 2 times daily to affected area of skin 22   Raza Porter MD   tamsulosin (FLOMAX) 0.4 MG capsule Take 1 capsule by mouth at bedtime    ProviderFran MD   Multiple Vitamin (MULTIVITAMIN) tablet Take 1 tablet by mouth every other day   Yes Fran Melvin MD   gemfibrozil (LOPID) 600 MG tablet Take 1 tablet by mouth 2 times daily (before meals)    Fran Melvin MD   apixaban (ELIQUIS STARTER PACK) 5 MG TABS tablet Take 10 mg (2 tablets) orally twice daily for 7 days, then take 5 mg (1 tablet) orally twice daily thereafter. (1st dose given in ED) 9/15/18   Nolvia Flynn PA-C       Current medications:    Current Facility-Administered Medications   Medication Dose Route Frequency Provider Last Rate Last Admin   • 0.9 % sodium chloride infusion   IntraVENous Continuous Gary Howell MD           Allergies:    Allergies   Allergen Reactions   • No Known Allergies        Problem List:    Patient Active Problem List   Diagnosis Code   • CKD (chronic kidney disease) stage 3, GFR 30-59 ml/min ([facility]) N18.30   • DVT (deep venous thrombosis) ([facility]) I82.409   • Hypertriglyceridemia E78.1   • Hypersomnia G47.10   • DONOVAN (obstructive sleep apnea) G47.33   • Right arm numbness R20.0       Past Medical History:        Diagnosis Date   • Diverticulitis    • DVT (deep venous thrombosis)

## 2025-06-17 NOTE — H&P
Subjective:     Kaiser Yo is a 69 y.o. male who was referred for evaluation of previous adenomatous polyps.      Patient's medications, allergies, past medical, surgical, social and family histories were reviewed and updated as appropriate.  Past medical history:  has a past medical history of Diverticulitis, DVT (deep venous thrombosis) (HCC), Hyperlipidemia, Sleep apnea, and UTI (urinary tract infection).  Past surgical history:  has a past surgical history that includes Colonoscopy (N/A, 02/15/2022); Colonoscopy (02/15/2022); Esophagogastroduodenoscopy (08/2022); and Upper gastrointestinal endoscopy (N/A, 08/29/2022).  Past social history:  reports that he quit smoking about 25 years ago. His smoking use included cigarettes. He started smoking about 54 years ago. He has a 43.1 pack-year smoking history. He has never used smokeless tobacco. He reports current alcohol use of about 10.0 standard drinks of alcohol per week. He reports that he does not use drugs.  Past family history: family history includes Alzheimer's Disease in his mother; High Cholesterol in his sister; Osteoporosis in his mother.    Allergies:   Allergies   Allergen Reactions    No Known Allergies      Current medications:   Current Facility-Administered Medications:     0.9 % sodium chloride infusion, , IntraVENous, Continuous, Gary Howell MD, Last Rate: 100 mL/hr at 06/17/25 1120, New Bag at 06/17/25 1120    Review of Systems  A comprehensive review of systems was negative.      Objective:     /66   Pulse 68   Temp 97.5 °F (36.4 °C) (Temporal)   Resp 16   Ht 1.753 m (5' 9\")   Wt 72.6 kg (160 lb)   SpO2 99%   BMI 23.63 kg/m²   General appearance: alert, appears stated age and cooperative  Eyes: conjunctivae/corneas clear. PERRL, EOM's intact. Fundi benign.  Ears: normal TM's and external ear canals both ears  Heart: regular rate and rhythm, S1, S2 normal, no murmur, click, rub or gallop  Abdomen: soft, non-tender; bowel

## 2025-06-17 NOTE — DISCHARGE INSTRUCTIONS
Call Dr. Lundberg for any problems and to schedule followup [phone].  May re-start Eliquis on Thursday 6/19      ENDOSCOPY DISCHARGE INSTRUCTIONS    You may experience some lightheadedness for the next several hours.  Plan on quiet relaxation for the rest of today.  A responsible adult needs to stay with you today.  Because of the medications you received today-do not drive,operate machinery,or sign any contractual agreement for the next 24 hours.  Do not drink any alcoholic beverages or take any unprescribed medications tonight.  Eat bland food and avoid anything greasy or spicy initially-progress to your normal diet gradually.  Diet restrictions as instructed.  You may resume home medications as instructed.  It is not unusual to experience some mild cramping or gas pains, and you may not have a bowel movement for several days.  If you have any of the following problems, notify your physician or return to the hospital emergency room : fever, chills, excessive bleeding, excessive vomiting, difficulty swallowing, uncontrolled pain, increased abdominal distention, shortness of breath or any other problems.  If you had a polyp removed, avoid strenuous activity for 48 hours.Avoid the use of aspirin or related compounds for one week, unless otherwise instructed by your physician.  You may notice a small amount of blood in your next few bowel movements, but if a large amount passes, call your physician.  If you have a sore throat, you may use lozenges or salt water gargles.  If you had biopsy's taken from your procedure call the office for results in 5-7 days.     ANESTHESIA DISCHARGE INSTRUCTIONS    Wear your seatbelt home.  You are under the influence of drugs-do not drink alcohol,drive,operate machinery,or make any important decisions or sign any legal documentsfor 24 hours  A responsible adult needs to be with you for 24 hours.  You may experience lightheadedness,dizziness,or sleepiness following

## 2025-06-18 ASSESSMENT — ENCOUNTER SYMPTOMS
CHEST TIGHTNESS: 0
ABDOMINAL PAIN: 0
DIARRHEA: 0
NAUSEA: 0
VOMITING: 0
SHORTNESS OF BREATH: 0

## 2025-06-18 NOTE — ED PROVIDER NOTES
[facility] EMERGENCY DEPARTMENT  EMERGENCY DEPARTMENT ENCOUNTER        Pt Name: Kaiser Yo  MRN: [id number]  Birthdate 1955  Date of evaluation: 6/17/2025  Provider: MARNIE Ryan CNP  PCP: Kecia, Pcp  Note Started: 3:45 AM EDT 6/18/25      TOM. I have evaluated this patient.        CHIEF COMPLAINT       Chief Complaint   Patient presents with    Leg Pain     Pt came in from home, pt reports right calf pain since having a colonoscopy today, pt reports hx of blood clots       HISTORY OF PRESENT ILLNESS: 1 or more Elements     History From: patient  Limitations to history : None    Kaiser Yo is a 69 y.o. male who presents to the emergency department with complaint of right calf pain.  The patient reports that he noticed the right calf pain today following colonoscopy.  He does have history of DVT and has been off of his Eliquis for 3 days, not due to resume the medication until Thursday morning.  He denies chest pain or shortness of breath.    Denies any headache, fever, lightheadedness, dizziness, visual disturbances.  No chest pain or pressure.  No neck or back pain.  No shortness of breath, cough, or congestion.  No abdominal pain, nausea, vomiting, diarrhea, constipation, or dysuria.  No rash.    Nursing Notes were all reviewed and agreed with or any disagreements were addressed in the HPI.    REVIEW OF SYSTEMS :      Review of Systems   Constitutional:  Negative for activity change, chills and fever.   Respiratory:  Negative for chest tightness and shortness of breath.    Cardiovascular:  Negative for chest pain.   Gastrointestinal:  Negative for abdominal pain, diarrhea, nausea and vomiting.   Genitourinary:  Negative for dysuria.   Musculoskeletal:         Right calf pain   All other systems reviewed and are negative.      Positives and Pertinent negatives as per HPI.     SURGICAL HISTORY     Past Surgical History:   Procedure Laterality Date    COLONOSCOPY N/A  RESULTS   LABS:    Labs Reviewed   BASIC METABOLIC PANEL - Abnormal; Notable for the following components:       Result Value    Glucose 135 (*)     BUN 23 (*)     All other components within normal limits   CBC WITH AUTO DIFFERENTIAL   D-DIMER, QUANTITATIVE       When ordered only abnormal lab results are displayed. All other labs were within normal range or not returned as of this dictation.    EKG: When ordered, EKG's are interpreted by the Emergency Department Physician in the absence of a cardiologist.  Please see their note for interpretation of EKG.    RADIOLOGY:   Non-plain film images such as CT, Ultrasound and MRI are read by the radiologist.      X-Ray Independently interpreted by me: none    Interpretation per the Radiologist below, if available at the time of this note:    Vascular duplex lower extremity venous right    (Results Pending)     Colonoscopy  Result Date: 2025  Patient: KAELYN KNOWLES MRN: [id number] : 1955 Account: [id number] Sex at Birth: Male Age: 69 Years Procedure: Colonoscopy Date: 2025 Attending Physician: Brent Lundberg Complications:        -  No immediate complications. Estimated Blood Loss:        -  Estimated blood loss: None. Procedure:        - The scope was introduced through the anus and advanced to the           cecum, identified by appendiceal orifice and ileocecal valve. Findings:        - See Dictation. Impression:        - See Dictation.        -  No specimens collected. Procedure Code(s):        - 00269, Colonoscopy, flexible; diagnostic, including collection of           specimen(s) by brushing or washing, when performed (separate           procedure) CPT(R) - 2023 copyright [employer]. All Rights Reserved.       The CPT codes, CCI edits and ICD codes generated are intended as       suggestions and were generated based on input data.  These codes are       preliminary and upon  review may be revised to meet current

## (undated) DEVICE — BW-412T DISP COMBO CLEANING BRUSH: Brand: SINGLE USE COMBINATION CLEANING BRUSH

## (undated) DEVICE — BITE BLOCK ENDOSCP AD 60 FR W/ ADJ STRP PLAS GRN BLOX

## (undated) DEVICE — SET VLV 3 PC AWS DISPOSABLE GRDIAN SCOPEVALET

## (undated) DEVICE — Device: Brand: DISPOSABLE ELECTROSURGICAL SNARE

## (undated) DEVICE — FORCEPS BX 240CM 2.4MM L NDL RAD JAW 4 M00513334

## (undated) DEVICE — ENDOSCOPIC KIT 6X3/16 FT COLON W/ 1.1 OZ 2 GWN W/O BRSH

## (undated) DEVICE — TRAP SPEC RETRV CLR PLAS POLYP IN LN SUCT QUIK CTCH

## (undated) DEVICE — PROCEDURE KIT ENDOSCP CUST

## (undated) DEVICE — ENDOSCOPY KIT: Brand: MEDLINE INDUSTRIES, INC.

## (undated) DEVICE — AIR/WATER CLEANING ADAPTER FOR OLYMPUS® GI ENDOSCOPE: Brand: BULLDOG®

## (undated) DEVICE — TUBING IRRIG COMPATIBLE W ERBE MEDIVATOR PMP HYDR

## (undated) DEVICE — SOLUTION IRRIG 500ML STRL H2O NONPYROGENIC

## (undated) DEVICE — FORCEPS BX L240CM WRK CHN 2.8MM STD CAP W/ NDL MIC MESH

## (undated) DEVICE — ELECTRODE PT RET AD L9FT HI MOIST COND ADH HYDRGEL CORDED

## (undated) DEVICE — SINGLE USE AIR/WATER, SUCTION AND BIOPSY VALVES SET: Brand: ORCAPOD™

## (undated) DEVICE — SOLUTION IV IRRIG WATER 500ML POUR BRL ST 2F7113

## (undated) DEVICE — CAP WATER BTL AIR TBNG L 16 IN CO2 TBNG L 48 IN ENDOSCP

## (undated) DEVICE — CONTAINER SPEC 480ML CLR POLYSTYR 10% NEUT BUFF FRMLN ZN